# Patient Record
Sex: MALE | Race: BLACK OR AFRICAN AMERICAN | NOT HISPANIC OR LATINO | Employment: OTHER | URBAN - METROPOLITAN AREA
[De-identification: names, ages, dates, MRNs, and addresses within clinical notes are randomized per-mention and may not be internally consistent; named-entity substitution may affect disease eponyms.]

---

## 2017-01-10 ENCOUNTER — TELEPHONE (OUTPATIENT)
Dept: SURGERY | Facility: CLINIC | Age: 42
End: 2017-01-10

## 2017-01-17 ENCOUNTER — OFFICE VISIT (OUTPATIENT)
Dept: GASTROENTEROLOGY | Facility: CLINIC | Age: 42
End: 2017-01-17

## 2017-01-17 VITALS
HEIGHT: 73 IN | TEMPERATURE: 97.7 F | HEART RATE: 80 BPM | DIASTOLIC BLOOD PRESSURE: 79 MMHG | WEIGHT: 147.4 LBS | OXYGEN SATURATION: 99 % | SYSTOLIC BLOOD PRESSURE: 116 MMHG | BODY MASS INDEX: 19.54 KG/M2

## 2017-01-17 DIAGNOSIS — K59.00 CONSTIPATION, UNSPECIFIED CONSTIPATION TYPE: Primary | ICD-10-CM

## 2017-01-17 DIAGNOSIS — S39.011A ABDOMINAL MUSCLE STRAIN, INITIAL ENCOUNTER: ICD-10-CM

## 2017-01-17 RX ORDER — POLYETHYLENE GLYCOL 3350 17 G/17G
1 POWDER, FOR SOLUTION ORAL 2 TIMES DAILY PRN
Qty: 510 G | Refills: 1 | Status: SHIPPED
Start: 2017-01-17

## 2017-01-17 ASSESSMENT — PAIN SCALES - GENERAL: PAINLEVEL: NO PAIN (0)

## 2017-01-17 NOTE — MR AVS SNAPSHOT
After Visit Summary   1/17/2017    Piter Conway    MRN: [id number]           Patient Information     Date Of Birth          1975        Visit Information        Provider Department      1/17/2017 9:00 AM [facility]        Today's Diagnoses     Constipation, unspecified constipation type    -  1       Care Instructions    It was a pleasure taking care of you today.  I've included a brief summary of our discussion and care plan from today's visit below.  Please review this information with your primary care provider.  _______________________________________________________________________    My recommendations are summarized as follows:  1. Start psyllium fiber (brand name is Metamucil) once per day (available over the counter) to prevent constipation.   2. Take miralax one to two times per day if you are feeling constipated.   3. Do abdominal stretches two to three times per day.  --    Return to GI Clinic in 3 months to review your progress.    _______________________________________________________________________    Who do I call with any questions after my visit?  Please be in touch if there are any further questions that arise following today's visit.  There are multiple ways to contact your gastroenterology care team.        During business hours, you may reach a Gastroenterology nurse at [phone] and choose option 3.         To schedule or reschedule an appointment, please call [phone].       You can always send a secure message through Popbasic.  Popbasic messages are answered by your nurse or doctor typically within 24 hours.  Please allow extra time on weekends and holidays.        For urgent/emergent questions after business hours, you may reach the on-call GI Fellow by contacting the [facility] at [phone].     How will I get the results of any tests ordered?    You will receive all of your results.  If you have signed up for  MyChart, any tests ordered at your visit will be available to you after your physician reviews them.  Typically this takes 1-2 weeks.  If there are urgent results that require a change in your care plan, your physician or nurse will call you to discuss the next steps.      What is [employer]?  Fliqz is a secure way for you to access all of your healthcare records from the [facility].  It is a web based computer program, so you can sign on to it from any location.  It also allows you to send secure messages to your care team.  I recommend signing up for Appetizer Mobilet access if you have not already done so and are comfortable with using a computer.      How to I schedule a follow-up visit?  If you did not schedule a follow-up visit today, please call [phone] to schedule a follow-up office visit.        Sincerely,    Alvin Weston MD  Fellow  [facility]  Division of Gastroenterology                Follow-ups after your visit        Your next 10 appointments already scheduled     Feb 03, 2017  8:30 AM   (Arrive by 8:00 AM)   RETURN DIABETES with Sheila Brewer PA-C   [facility] ([facility])    [address] [place] [address] [id number] [phone]              Who to contact     Please call your clinic at [phone] to:    Ask questions about your health    Make or cancel appointments    Discuss your medicines    Learn about your test results    Speak to your doctor   If you have compliments or concerns about an experience at your clinic, or if you wish to file a complaint, please contact [facility] Patient Relations at [phone] or email us at [email or web address]         Additional Information About Your Visit        MyChart Information     Appetizer Mobilet gives you secure access to your electronic health record. If you see a primary care provider, you can also send messages to your care team and  "make appointments. If you have questions, please call your primary care clinic.  If you do not have a primary care provider, please call [phone] and they will assist you.      [email or web address] is an electronic gateway that provides easy, online access to your medical records. With [email or web address], you can request a clinic appointment, read your test results, renew a prescription or communicate with your care team.     To access your existing account, please contact your [facility] or call [phone] for assistance.        Care EveryWhere ID     This is your Care EveryWhere ID. This could be used by other organizations to access your [facility] medical records  [id number]        Your Vitals Were     Pulse Temperature Height BMI (Body Mass Index) Pulse Oximetry       80 97.7  F (36.5  C) (Oral) 1.854 m (6' 1\") 19.45 kg/m2 99%        Blood Pressure from Last 3 Encounters:   01/17/17 116/79   10/25/16 117/81   10/11/16 122/85    Weight from Last 3 Encounters:   01/17/17 66.86 kg (147 lb 6.4 oz)   10/25/16 65.363 kg (144 lb 1.6 oz)   10/11/16 64.864 kg (143 lb)              Today, you had the following     No orders found for display         Today's Medication Changes          These changes are accurate as of: 1/17/17  9:57 AM.  If you have any questions, ask your nurse or doctor.               Start taking these medicines.        Dose/Directions    polyethylene glycol powder   Commonly known as:  MIRALAX   Used for:  Constipation, unspecified constipation type   Started by:  Alvin Weston        Dose:  1 capful   Take 17 g (1 capful) by mouth 2 times daily as needed for constipation   Quantity:  510 g   Refills:  1            Where to get your medicines      These medications were sent to [facility] [place], [place] - [address]  [address] [address]     Phone:  [phone]    - polyethylene glycol powder             Primary Care Provider Office " Phone # Fax #    Rosamaria Ronda Banerjee [phone]       [place] [id number] [place] [id number]        Thank you!     Thank you for choosing [facility]STROENTEROLOGY AND IBD  for your care. Our goal is always to provide you with excellent care. Hearing back from our patients is one way we can continue to improve our services. Please take a few minutes to complete the written survey that you may receive in the mail after your visit with us. Thank you!             Your Updated Medication List - Protect others around you: Learn how to safely use, store and throw away your medicines at www.disposemymeds.org.          This list is accurate as of: 1/17/17  9:57 AM.  Always use your most recent med list.                   Brand Name Dispense Instructions for use    BENADRYL ALLERGY PO      Take 1 tablet by mouth daily       blood glucose monitoring lancets     1 Box    Use to test blood sugar 2-3 times daily or as directed.       blood glucose monitoring meter device kit     1 kit    Use to test blood sugar       * blood glucose monitoring test strip    MARCO ANTONIO CONTOUR NEXT    200 each    Use to test blood sugar 2 times daily or as directed.       * ONE TOUCH VERIO IQ test strip   Generic drug:  blood glucose monitoring     2 Box    Test blood sugar twice daily.       fluticasone 50 MCG/ACT spray    FLONASE    16 g    Spray 2 sprays into both nostrils daily       metFORMIN 500 MG 24 hr tablet    GLUCOPHAGE-XR    360 tablet    Take 2 tablets (1,000 mg) by mouth 2 times daily (with meals)       omeprazole 20 MG CR capsule    priLOSEC    30 capsule    Take 20 mg daily       polyethylene glycol powder    MIRALAX    510 g    Take 17 g (1 capful) by mouth 2 times daily as needed for constipation       * Notice:  This list has 2 medication(s) that are the same as other medications prescribed for you. Read the directions carefully, and ask your doctor or other care provider to review them with you.

## 2017-01-17 NOTE — PATIENT INSTRUCTIONS
It was a pleasure taking care of you today.  I've included a brief summary of our discussion and care plan from today's visit below.  Please review this information with your primary care provider.  _______________________________________________________________________    My recommendations are summarized as follows:  1. Start psyllium fiber (brand name is Metamucil) once per day (available over the counter) to prevent constipation.   2. Take miralax one to two times per day if you are feeling constipated.   3. Do abdominal stretches two to three times per day.  --    Return to GI Clinic in 3 months to review your progress.    _______________________________________________________________________    Who do I call with any questions after my visit?  Please be in touch if there are any further questions that arise following today's visit.  There are multiple ways to contact your gastroenterology care team.        During business hours, you may reach a Gastroenterology nurse at [phone] and choose option 3.         To schedule or reschedule an appointment, please call [phone].       You can always send a secure message through Taomee.  Taomee messages are answered by your nurse or doctor typically within 24 hours.  Please allow extra time on weekends and holidays.        For urgent/emergent questions after business hours, you may reach the on-call GI Fellow by contacting the [facility] at [phone].     How will I get the results of any tests ordered?    You will receive all of your results.  If you have signed up for Taomee, any tests ordered at your visit will be available to you after your physician reviews them.  Typically this takes 1-2 weeks.  If there are urgent results that require a change in your care plan, your physician or nurse will call you to discuss the next steps.      What is Taomee?  Taomee is a secure way for you to access all of your healthcare records from  the [facility].  It is a web based computer program, so you can sign on to it from any location.  It also allows you to send secure messages to your care team.  I recommend signing up for Tagito access if you have not already done so and are comfortable with using a computer.      How to I schedule a follow-up visit?  If you did not schedule a follow-up visit today, please call [phone] to schedule a follow-up office visit.        Sincerely,    Alvin Weston MD  Fellow  [facility]  Division of Gastroenterology

## 2017-01-17 NOTE — NURSING NOTE
"Chief Complaint   Patient presents with     RECHECK     Follow up cte       Filed Vitals:    01/17/17 0906   BP: 116/79   Pulse: 80   Temp: 97.7  F (36.5  C)   TempSrc: Oral   Height: 6' 1\"   Weight: 147 lb 6.4 oz   SpO2: 99%       Body mass index is 19.45 kg/(m^2).    Jessica Gimenez CMA                            "

## 2017-01-19 ENCOUNTER — TELEPHONE (OUTPATIENT)
Dept: ENDOCRINOLOGY | Facility: CLINIC | Age: 42
End: 2017-01-19

## 2017-01-19 DIAGNOSIS — E13.9 LADA (LATENT AUTOIMMUNE DIABETES IN ADULTS), MANAGED AS TYPE 1 (H): Primary | ICD-10-CM

## 2017-01-24 NOTE — PROGRESS NOTES
GI CLINIC VISIT    GI CLINIC VISIT    CC/REFERRING MD:  Rosamaria Banerjee    REASON FOR CONSULTATION:    I was asked to see Mr Conway in consultation at the request of Dr. Rosamaria Banerjee for chronic abdominal pain.    ASSESSMENT/PLAN:  Mr Conway is a 43 yo man with h/o treated H pylori who presents for follow up of chronic right upper quadrant abdominal pain x12 years.     Mr Man's pin point RLQ abdominal pain is largely the same in quality as our previous visit in 10/25/16. He still has a positive Carnett's sign and notes pain in supine position when raising legs. He also notes abdominal pain when constipated (when having a BM every other day rather than 1-2 per day), which gets better after defecation. In addition, he notes abdominal pain when eating large meals. He has stopped taking NSAIDs and instead uses small doses of tylenol.     He did not have any abnormalities on his CT outside of moderate stool burden. Most likely this is consistent with musculoskeletal etiology. I have given him some abdominal stretches to perform. We will also start metamucil to keep him regular and miralax when he feels constipated in case this is contributing. He is unlikely to have an ulcer causing pain for this long.     - start metamucil QD  - start miralax PRN constipation (if no BM for one day)  - abdominal stretches  - if still having abdominal pain despite these measures, could consider colonoscopy    RTC 3 months.    Patient was seen and discussed with attending, Dr Da Silva.    Thank you for this consultation.  It was a pleasure to participate in the care of this patient; please contact us with any further questions.      Alvin Weston MD  Fellow  Division of Gastroenterology, Hepatology and Nutrition  [facility]    ATTENDING ATTESTATION:     DATE SEEN: 1/17/2017    Patient was discussed, seen, and examined by me, Rm Da Silva. The plan of care and pertinent data/imaging were also reviewed with the GI  Consult team and GI Fellow as well. Agree with the assessment and plan as delineated above.    Please contact me with any further questions.    Rm Da Silva MD    [facility]  Division of Gastroenterology, Hepatology and Nutrition          HPI  Mr Conway is a 41 yo man with h/o treated H pylori who presents for follow up of chronic right upper quadrant abdominal pain x12 years.     Since our initial visit 10/25/16, he still notes pin point sharp RLQ pain 2inches lateral and 1 inch inferior to the umbilicus. He thinks this is slightly improved since our previous visit. He has been doing sit ups. He stopped taking ibuprofen (was taking ~4g/week). He notes pain if he raises his legs while in the supine position. He will also get the pain 20-30 minutes after eating a large meal; small meals do not bother him. This pain lasts 15 minutes. It also occurs when he strains for a bowel movement, which occurs if he doesn't have a bowel movement for a day. He then feels constipated. Usually he has 1-2 BM/day though.     He had an EGD in 2006 and was diagnosed with H pylori s/p treatment with confirmation of eradication by stool antigen. He takes omeprazole for reflux symptoms as needed which does not affect abdominal pain.     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) (H) 08/05/2014     Priority: Medium     Erectile dysfunction 02/06/2013     Atopic dermatitis 02/06/2013     Seasonal allergic rhinitis  02/06/2013     PERTINENT PAST MEDICAL HISTORY:  Past Medical History   Diagnosis Date     History of hepatitis A 2001     Diabetes (H) 2014     PREVIOUS SURGERIES:  Past Surgical History   Procedure Laterality Date     No history of surgery       PREVIOUS ENDOSCOPY:  EGD 2006 without any ulcer noted    ALLERGIES:   No Known Allergies    PERTINENT MEDICATIONS:    Current outpatient prescriptions:      polyethylene glycol (MIRALAX) powder, Take 17 g (1 capful) by mouth 2 times daily as needed for constipation, Disp: 510 g, Rfl: 1     blood glucose monitoring (ONETOUCH VERIO) meter device kit, Use to test blood sugar, Disp: 1 kit, Rfl: 0     fluticasone (FLONASE) 50 MCG/ACT nasal spray, Spray 2 sprays into both nostrils daily, Disp: 16 g, Rfl: 3     blood glucose monitoring (MARCO ANTONIO CONTOUR NEXT) test strip, Use to test blood sugar 2 times daily or as directed., Disp: 200 each, Rfl: 2     omeprazole (PRILOSEC) 20 MG capsule, Take 20 mg daily, Disp: 30 capsule, Rfl: 2     metFORMIN (GLUCOPHAGE-XR) 500 MG 24 hr tablet, Take 2 tablets (1,000 mg) by mouth 2 times daily (with meals), Disp: 360 tablet, Rfl: 3     blood glucose monitoring (MARCO ANTONIO MICROLET) lancets, Use to test blood sugar 2-3 times daily or as directed., Disp: 1 Box, Rfl: 12     blood glucose monitoring (ONE TOUCH VERIO IQ) test strip, Test blood sugar twice daily., Disp: 180 each, Rfl: 3     DiphenhydrAMINE HCl (BENADRYL ALLERGY PO), Take 1 tablet by mouth daily, Disp: , Rfl:     SOCIAL HISTORY:  Social History     Social History     Marital Status:      Spouse Name: N/A     Number of Children: N/A     Years of Education: N/A     Occupational History     Not on file.     Social History Main Topics     Smoking status: Never Smoker      Smokeless tobacco: Not on file     Alcohol Use: No     Drug Use: No     Sexual Activity:     Partners: Female      Comment:      Other Topics Concern     Not on file     Social History Narrative     FAMILY  "HISTORY:  Family History   Problem Relation Age of Onset     Hypertension Father      DIABETES Father      Glaucoma No family hx of      Macular Degeneration No family hx of      Past/family/social history reviewed and no changes    PHYSICAL EXAMINATION:  Constitutional: aaox3, cooperative, pleasant, not dyspneic/diaphoretic, no acute distress  Vitals reviewed: /79 mmHg  Pulse 80  Temp(Src) 97.7  F (36.5  C) (Oral)  Ht 1.854 m (6' 1\")  Wt 66.86 kg (147 lb 6.4 oz)  BMI 19.45 kg/m2  SpO2 99%  Wt:   Wt Readings from Last 2 Encounters:   01/17/17 66.86 kg (147 lb 6.4 oz)   10/25/16 65.363 kg (144 lb 1.6 oz)      Eyes: Sclera anicteric/injected  Ears/nose/mouth/throat: Normal oropharynx without ulcers or exudate, mucus membranes moist, hearing intact  Neck: supple  CV: No edema  Respiratory: Unlabored breathing  Lymph: No cervical lymphadenopathy  Abd: TTP to 2cm area 2in to the right and 1in inferior of umbilicus, Nondistended, +bs, no hepatosplenomegaly, nontender, no peritoneal signs  Skin: warm, perfused, no jaundice  Psych: Normal affect  MSK: Normal gait    PERTINENT STUDIES:    Orders Only on 10/11/2016   Component Date Value Ref Range Status     Creatinine Urine 10/11/2016 120   Final     Albumin Urine mg/L 10/11/2016 5   Final     Albumin Urine mg/g Cr 10/11/2016 4.50  0 - 17 mg/g Cr Final     Creatinine 10/11/2016 0.83  0.66 - 1.25 mg/dL Final     GFR Estimate 10/11/2016   >60 mL/min/1.7m2 Final                    Value:>90  Non  GFR Calc       GFR Estimate If Black 10/11/2016   >60 mL/min/1.7m2 Final                    Value:>90   GFR Calc       Cholesterol 10/11/2016 127  <200 mg/dL Final     Triglycerides 10/11/2016 56  <150 mg/dL Final     HDL Cholesterol 10/11/2016 45  >39 mg/dL Final     LDL Cholesterol Calculated 10/11/2016 71  <100 mg/dL Final     Non HDL Cholesterol 10/11/2016 82  <130 mg/dL Final     TSH 10/11/2016 0.97  0.40 - 4.00 mU/L Final     Potassium " 10/11/2016 4.4  3.4 - 5.3 mmol/L Final     ALT 10/11/2016 15  0 - 70 U/L Final     AST 10/11/2016 10  0 - 45 U/L Final     T4 Free 10/11/2016 1.07  0.76 - 1.46 ng/dL Final     CT A/P 10/27/16  IMPRESSION:    1. No acute finding within abdomen pelvis to explain patient's right  upper quadrant pain.  2. Subcentimeter hepatic cyst.

## 2017-01-30 ENCOUNTER — TELEPHONE (OUTPATIENT)
Dept: ENDOCRINOLOGY | Facility: CLINIC | Age: 42
End: 2017-01-30

## 2017-01-30 NOTE — TELEPHONE ENCOUNTER
Pt called re: metformin. Requesting new Rx. Please advise. Can be reached at [phone]. Detailed VM fine. Message sent to refill pool.

## 2017-01-31 DIAGNOSIS — E13.9 LADA (LATENT AUTOIMMUNE DIABETES MELLITUS IN ADULTS) (H): Primary | ICD-10-CM

## 2017-01-31 RX ORDER — METFORMIN HCL 500 MG
1000 TABLET, EXTENDED RELEASE 24 HR ORAL 2 TIMES DAILY WITH MEALS
Qty: 360 TABLET | Refills: 3 | Status: SHIPPED | OUTPATIENT
Start: 2017-01-31 | End: 2018-02-07

## 2017-01-31 NOTE — TELEPHONE ENCOUNTER
Metformin  Last Written Prescription Date:  1/11/16  Last Fill Quantity: 360,   # refills: 3  Last Office Visit : 10/11/16  Future Office visit:  2/3/17

## 2017-02-07 ENCOUNTER — OFFICE VISIT (OUTPATIENT)
Dept: ENDOCRINOLOGY | Facility: CLINIC | Age: 42
End: 2017-02-07

## 2017-02-07 VITALS
HEIGHT: 73 IN | SYSTOLIC BLOOD PRESSURE: 129 MMHG | DIASTOLIC BLOOD PRESSURE: 84 MMHG | BODY MASS INDEX: 19.16 KG/M2 | WEIGHT: 144.6 LBS | HEART RATE: 74 BPM

## 2017-02-07 DIAGNOSIS — E10.65 TYPE 1 DIABETES MELLITUS WITH HYPERGLYCEMIA (H): Primary | ICD-10-CM

## 2017-02-07 LAB — HBA1C MFR BLD: 5.8 % (ref 4.3–6)

## 2017-02-07 RX ORDER — BISMUTH SUBSALICYLATE 262MG/15ML
1 SUSPENSION, ORAL (FINAL DOSE FORM) ORAL 2 TIMES DAILY
Qty: 1 BOX | Refills: 3 | Status: SHIPPED | OUTPATIENT
Start: 2017-02-07 | End: 2017-08-08

## 2017-02-07 NOTE — PROGRESS NOTES
HPI  Piter Conway is a 42 year old male with SAMANTA here today for a follow up visit.  He continues to do well.  He was found to have an A1C of 10.6 % one year ago.  His EJ was + and C-peptide 1.0.   Pt is currently taking Metformin 1000 mg po BID.  Pt made significant changes in his lifestyle and is eating healthy and exercising.  His A1C is 5.8 % today and his previous A1C was 6.0 %.   We downloaded his glucose meter today and his blood sugar values are good with an average glucose of 126 with SD 13.  No hypoglycemia.  On ROS today, he no longer has RUQ pain.  Pt is taking meds for constipation which have been helpful.  Pt denies blurred vision, n/v, SOB, cough, chest pain, dysuria or hematuria.  No foot ulcers.  He denies numbness, tingling or pain in his feet or hands.    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  Nephropathy:none; pt's urine microalbuminuria negative in Oct 2016.  Neuropathy:none.  Foot Exam:no ulcers; normal monofilamentous exam.  Taking aspirin:no  Lipids: LDL 71 in Oct 2016.    ROS  Please see under HPI.    Allergies  No Known Allergies    Medications  Current Outpatient Prescriptions   Medication Sig Dispense Refill     blood glucose monitoring (ULTRA THIN 30G) lancets 1 each 2 times daily Use to test blood sugar. 1 Box 3     metFORMIN (GLUCOPHAGE-XR) 500 MG 24 hr tablet Take 2 tablets (1,000 mg) by mouth 2 times daily (with meals) 360 tablet 3     blood glucose monitoring (ONE TOUCH VERIO IQ) test strip Test blood sugar twice daily. 180 each 3     polyethylene glycol (MIRALAX) powder Take 17 g (1 capful) by mouth 2 times daily as needed for constipation 510 g 1     blood glucose monitoring (ONETOUCH VERIO) meter device kit Use to test blood sugar 1 kit 0     fluticasone (FLONASE) 50 MCG/ACT nasal spray Spray 2 sprays into both nostrils daily 16 g 3     omeprazole (PRILOSEC) 20 MG capsule Take 20 mg daily 30 capsule 2      "DiphenhydrAMINE HCl (BENADRYL ALLERGY PO) Take 1 tablet by mouth daily         Family History  family history includes DIABETES in his father; Hypertension in his father. There is no history of Glaucoma or Macular Degeneration.    Social History  Smoke: none.  ETOH: none.    Past Medical History  1. SAMANTA - dx 2014.    Physical Exam  /84 mmHg  Pulse 74  Ht 1.854 m (6' 1\")  Wt 65.59 kg (144 lb 9.6 oz)  BMI 19.08 kg/m2  Body mass index is 19.08 kg/(m^2).    GENERAL : In no apparent distress.  SKIN: Normal.  EYES: PERRLA.    MOUTH: Moist.  NECK: No goiter.  RESP: Lungs clear to auscultation.  CARDIAC: RRR.   ABDOMEN: Nontender.   NEURO: awake, alert, responds appropriately to questions.    Moves all extremities; Gait normal.  No tremor.    EXTREMITIES: No edema.  FEET:  No ulcers; normal monofilamentous exam.    RESULTS  CREATININE   Date Value Ref Range Status   10/11/2016 0.83 0.66 - 1.25 mg/dL Final     GFR ESTIMATE   Date Value Ref Range Status   10/11/2016 >90  Non  GFR Calc   >60 mL/min/1.7m2 Final     HEMOGLOBIN A1C   Date Value Ref Range Status   06/26/2014 10.6* 4.1 - 5.7 % Final     POTASSIUM   Date Value Ref Range Status   10/11/2016 4.4 3.4 - 5.3 mmol/L Final     ALT   Date Value Ref Range Status   10/11/2016 15 0 - 70 U/L Final     AST   Date Value Ref Range Status   10/11/2016 10 0 - 45 U/L Final     TSH   Date Value Ref Range Status   10/11/2016 0.97 0.40 - 4.00 mU/L Final     T4 FREE   Date Value Ref Range Status   10/11/2016 1.07 0.76 - 1.46 ng/dL Final       CHOLESTEROL   Date Value Ref Range Status   10/11/2016 127 <200 mg/dL Final   08/27/2015 132 <200 mg/dL Final     Comment:     LDL Cholesterol is the primary guide to therapy.   The NCEP recommends further evaluation of: patients with cholesterol greater   than 200 mg/dL if additional risk factors are present, cholesterol greater   than   240 mg/dL, triglycerides greater than 150 mg/dL, or HDL less than 40 mg/dL.       HDL " CHOLESTEROL   Date Value Ref Range Status   10/11/2016 45 >39 mg/dL Final   08/27/2015 44 >40 mg/dL Final     LDL CHOLESTEROL CALCULATED   Date Value Ref Range Status   10/11/2016 71 <100 mg/dL Final     Comment:     Desirable:       <100 mg/dl   08/27/2015 79 0 - 129 mg/dL Final     Comment:     LDL Cholesterol is the primary guide to therapy: LDL-cholesterol goal in high   risk patients is <100 mg/dL and in very high risk patients is <70 mg/dL.       LDL CHOLESTEROL DIRECT   Date Value Ref Range Status   02/06/2013 87.0 0.0 - 129.0 Final     TRIGLYCERIDES   Date Value Ref Range Status   10/11/2016 56 <150 mg/dL Final   08/27/2015 47 0 - 150 mg/dL Final     CHOLESTEROL/HDL RATIO   Date Value Ref Range Status   08/27/2015 3.0 0.0 - 5.0 Final   09/11/2014 2.8 0.0 - 5.0 Final     A1C      5.8    2/7/2017  A1C      6.0    10/11/2016  A1C      5.7    6/7/2016  A1C      6.3    3/4/2016  A1C      6.0    10/2/2015  A1C      6.6    4/2015  A1C     10.6   6/26/2014  A1C      6.0   2/6/2013  A1C      5.6   3/20/2012    ASSESSMENT/PLAN:    1.  SAMANTA:  Good glycemic control at this time with lifestyle changes and Metformin.  No need for insulin at this time.  I asked him to continue to check his blood sugar BID.  He is to remain on Metformin.  Pt is to notify us if he has FBS values > 130 or 2 hr postprandial blood sugar values are > 180.  He is also to notify us if he develops polydipsia, polyuria, or blurred vision.  Encouraged pt to continues to eat healthy and exercise.  His BP is stable today.  Creat is 0.83 with GFR > 90 mL/min in Oct 2016.  His urine microalbuminuria was negative in 10/2016.  Feet are in good condition with normal monofilamentous exam.  TSH also normal in Oct 2016.  Pt's LDL 71 in 10/2016.  Pt refuses the flu vaccine.    2. MILD NPDR: Pt seen by Oph here in 3/2016.  No visual changes.  He will see Oph again this spring 2017.    3.  Return to Endocrine Clinic to see me in 4 months and sooner if he develops  symptoms of hyperglycemia or blood sugar values above target.  He is aware that he will need insulin RX at some time in the near future.  Will arrange for pt to see Dr. Alexander in 8 months.

## 2017-02-07 NOTE — MR AVS SNAPSHOT
After Visit Summary   2/7/2017    Piter Conway    MRN: [id number]           Patient Information     Date Of Birth          1975        Visit Information        Provider Department      2/7/2017 9:00 AM Sheila Brewer PA-C [facility] Endocrinology        Today's Diagnoses     Type 1 diabetes mellitus with hyperglycemia (H)    -  1       Care Instructions    To expedite your medication refill(s), please contact your pharmacy and have them fax a refill request to: [phone].  *Please allow 3 business days for routine medication refills.  *Please allow 5 business days for controlled substance medication refills.  --------------------  For scheduling appointments (including lab work), please request an appointment through [employer], or call: [phone].    For questions for your provider or the endocrine nurse, please send a [employer] message.  For after-hours urgent issues, please dial [phone], and ask to speak with the Endocrinologist On-Call.  --------------------  Please Note: If you are active on [employer], all future test results will be sent by [employer] message only and will no longer be sent by mail. You may also receive communication directly from your physician.          Follow-ups after your visit        Follow-up notes from your care team     Return in about 4 months (around 6/7/2017).      Your next 10 appointments already scheduled     Apr 10, 2017  2:30 PM   RETURN GENERAL with Analisa Long MD   Eye Clinic ([facility])    Navjot Padgett [address]  [facility] [id number] [phone]            Jun 06, 2017  8:30 AM   (Arrive by 8:15 AM)   RETURN DIABETES with MELISSA Bai [facility] Endocrinology ([facility])    [address] [facility]  [address] [facility] [id number] [phone]              Who to contact     Please call your clinic at [phone] to:    Ask questions about your  "health    Make or cancel appointments    Discuss your medicines    Learn about your test results    Speak to your doctor   If you have compliments or concerns about an experience at your clinic, or if you wish to file a complaint, please contact [facility] Patient Relations at [phone] or email us at [email or web address]         Additional Information About Your Visit        MyChart Information     [employer] gives you secure access to your electronic health record. If you see a primary care provider, you can also send messages to your care team and make appointments. If you have questions, please call your primary care clinic.  If you do not have a primary care provider, please call [phone] and they will assist you.      [employer] is an electronic gateway that provides easy, online access to your medical records. With [employer], you can request a clinic appointment, read your test results, renew a prescription or communicate with your care team.     To access your existing account, please contact your [facility] or call [phone] for assistance.        Care EveryWhere ID     This is your Care EveryWhere ID. This could be used by other organizations to access your [place] medical records  [id number]        Your Vitals Were     Pulse Height BMI (Body Mass Index)             74 1.854 m (6' 1\") 19.08 kg/m2          Blood Pressure from Last 3 Encounters:   02/07/17 129/84   01/17/17 116/79   10/25/16 117/81    Weight from Last 3 Encounters:   02/07/17 65.59 kg (144 lb 9.6 oz)   01/17/17 66.86 kg (147 lb 6.4 oz)   10/25/16 65.363 kg (144 lb 1.6 oz)              Today, you had the following     No orders found for display         Today's Medication Changes          These changes are accurate as of: 2/7/17  9:49 AM.  If you have any questions, ask your nurse or doctor.               These medicines have changed or have updated prescriptions.     "    Dose/Directions    blood glucose monitoring lancets   This may have changed:    - how much to take  - how to take this  - when to take this  - additional instructions   Used for:  Type 1 diabetes mellitus with hyperglycemia (H)        Dose:  1 each   1 each 2 times daily Use to test blood sugar.   Quantity:  1 Box   Refills:  3       blood glucose monitoring test strip   Commonly known as:  ONE TOUCH VERIO IQ   This may have changed:  Another medication with the same name was removed. Continue taking this medication, and follow the directions you see here.   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   Quantity:  180 each   Refills:  3            Where to get your medicines      These medications were sent to [facility] [place], [place] - [address]  [address] [address]     Phone:  [phone]    - blood glucose monitoring lancets             Primary Care Provider Office Phone # Fax #    Rosamaria Banerjee [phone]       [facility] [address] [facility] [address]  [facility] [address]        Thank you!     Thank you for choosing [facility] ENDOCRINOLOGY  for your care. Our goal is always to provide you with excellent care. Hearing back from our patients is one way we can continue to improve our services. Please take a few minutes to complete the written survey that you may receive in the mail after your visit with us. Thank you!             Your Updated Medication List - Protect others around you: Learn how to safely use, store and throw away your medicines at www.disposemymeds.org.          This list is accurate as of: 2/7/17  9:49 AM.  Always use your most recent med list.                   Brand Name Dispense Instructions for use    BENADRYL ALLERGY PO      Take 1 tablet by mouth daily       blood glucose monitoring lancets     1 Box    1 each 2 times daily Use to test blood  sugar.       blood glucose monitoring meter device kit     1 kit    Use to test blood sugar       blood glucose monitoring test strip    ONE TOUCH VERIO IQ    180 each    Test blood sugar twice daily.       fluticasone 50 MCG/ACT spray    FLONASE    16 g    Spray 2 sprays into both nostrils daily       metFORMIN 500 MG 24 hr tablet    GLUCOPHAGE-XR    360 tablet    Take 2 tablets (1,000 mg) by mouth 2 times daily (with meals)       omeprazole 20 MG CR capsule    priLOSEC    30 capsule    Take 20 mg daily       polyethylene glycol powder    MIRALAX    510 g    Take 17 g (1 capful) by mouth 2 times daily as needed for constipation

## 2017-02-07 NOTE — Clinical Note
2/7/2017       RE: Piter Conway  [address]   [facility] [address]     Dear Colleague,    Thank you for referring your patient, Piter Conway, to the [facility] ENDOCRINOLOGY at [facility]. Please see a copy of my visit note below.    HPI  Piter Conway is a 42 year old male with SAMANTA here today for a follow up visit.  He continues to do well.  He was found to have an A1C of 10.6 % one year ago.  His EJ was + and C-peptide 1.0.   Pt is currently taking Metformin 1000 mg po BID.  Pt made significant changes in his lifestyle and is eating healthy and exercising.  His A1C is 5.8 % today and his previous A1C was 6.0 %.   We downloaded his glucose meter today and his blood sugar values are good with an average glucose of 126 with SD 13.  No hypoglycemia.  On ROS today, he no longer has RUQ pain.  Pt is taking meds for constipation which have been helpful.  Pt denies blurred vision, n/v, SOB, cough, chest pain, dysuria or hematuria.  No foot ulcers.  He denies numbness, tingling or pain in his feet or hands.    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  Nephropathy:none; pt's urine microalbuminuria negative in Oct 2016.  Neuropathy:none.  Foot Exam:no ulcers; normal monofilamentous exam.  Taking aspirin:no  Lipids: LDL 71 in Oct 2016.    ROS  Please see under HPI.    Allergies  No Known Allergies    Medications  Current Outpatient Prescriptions   Medication Sig Dispense Refill     blood glucose monitoring (ULTRA THIN 30G) lancets 1 each 2 times daily Use to test blood sugar. 1 Box 3     metFORMIN (GLUCOPHAGE-XR) 500 MG 24 hr tablet Take 2 tablets (1,000 mg) by mouth 2 times daily (with meals) 360 tablet 3     blood glucose monitoring (ONE TOUCH VERIO IQ) test strip Test blood sugar twice daily. 180 each 3     polyethylene glycol (MIRALAX) powder Take 17 g (1 capful) by mouth 2 times daily as needed for constipation 510 g 1  "    blood glucose monitoring (ONETOUCH VERIO) meter device kit Use to test blood sugar 1 kit 0     fluticasone (FLONASE) 50 MCG/ACT nasal spray Spray 2 sprays into both nostrils daily 16 g 3     omeprazole (PRILOSEC) 20 MG capsule Take 20 mg daily 30 capsule 2     DiphenhydrAMINE HCl (BENADRYL ALLERGY PO) Take 1 tablet by mouth daily         Family History  family history includes DIABETES in his father; Hypertension in his father. There is no history of Glaucoma or Macular Degeneration.    Social History  Smoke: none.  ETOH: none.    Past Medical History  1. SAMANTA - dx 2014.    Physical Exam  /84 mmHg  Pulse 74  Ht 1.854 m (6' 1\")  Wt 65.59 kg (144 lb 9.6 oz)  BMI 19.08 kg/m2  Body mass index is 19.08 kg/(m^2).    GENERAL : In no apparent distress.  SKIN: Normal.  EYES: PERRLA.    MOUTH: Moist.  NECK: No goiter.  RESP: Lungs clear to auscultation.  CARDIAC: RRR.   ABDOMEN: Nontender.   NEURO: awake, alert, responds appropriately to questions.    Moves all extremities; Gait normal.  No tremor.    EXTREMITIES: No edema.  FEET:  No ulcers; normal monofilamentous exam.    RESULTS  CREATININE   Date Value Ref Range Status   10/11/2016 0.83 0.66 - 1.25 mg/dL Final     GFR ESTIMATE   Date Value Ref Range Status   10/11/2016 >90  Non  GFR Calc   >60 mL/min/1.7m2 Final     HEMOGLOBIN A1C   Date Value Ref Range Status   06/26/2014 10.6* 4.1 - 5.7 % Final     POTASSIUM   Date Value Ref Range Status   10/11/2016 4.4 3.4 - 5.3 mmol/L Final     ALT   Date Value Ref Range Status   10/11/2016 15 0 - 70 U/L Final     AST   Date Value Ref Range Status   10/11/2016 10 0 - 45 U/L Final     TSH   Date Value Ref Range Status   10/11/2016 0.97 0.40 - 4.00 mU/L Final     T4 FREE   Date Value Ref Range Status   10/11/2016 1.07 0.76 - 1.46 ng/dL Final       CHOLESTEROL   Date Value Ref Range Status   10/11/2016 127 <200 mg/dL Final   08/27/2015 132 <200 mg/dL Final     Comment:     LDL Cholesterol is the primary " guide to therapy.   The NCEP recommends further evaluation of: patients with cholesterol greater   than 200 mg/dL if additional risk factors are present, cholesterol greater   than   240 mg/dL, triglycerides greater than 150 mg/dL, or HDL less than 40 mg/dL.       HDL CHOLESTEROL   Date Value Ref Range Status   10/11/2016 45 >39 mg/dL Final   08/27/2015 44 >40 mg/dL Final     LDL CHOLESTEROL CALCULATED   Date Value Ref Range Status   10/11/2016 71 <100 mg/dL Final     Comment:     Desirable:       <100 mg/dl   08/27/2015 79 0 - 129 mg/dL Final     Comment:     LDL Cholesterol is the primary guide to therapy: LDL-cholesterol goal in high   risk patients is <100 mg/dL and in very high risk patients is <70 mg/dL.       LDL CHOLESTEROL DIRECT   Date Value Ref Range Status   02/06/2013 87.0 0.0 - 129.0 Final     TRIGLYCERIDES   Date Value Ref Range Status   10/11/2016 56 <150 mg/dL Final   08/27/2015 47 0 - 150 mg/dL Final     CHOLESTEROL/HDL RATIO   Date Value Ref Range Status   08/27/2015 3.0 0.0 - 5.0 Final   09/11/2014 2.8 0.0 - 5.0 Final     A1C      5.8    2/7/2017  A1C      6.0    10/11/2016  A1C      5.7    6/7/2016  A1C      6.3    3/4/2016  A1C      6.0    10/2/2015  A1C      6.6    4/2015  A1C     10.6   6/26/2014  A1C      6.0   2/6/2013  A1C      5.6   3/20/2012    ASSESSMENT/PLAN:    1.  SAMANTA:  Good glycemic control at this time with lifestyle changes and Metformin.  No need for insulin at this time.  I asked him to continue to check his blood sugar BID.  He is to remain on Metformin.  Pt is to notify us if he has FBS values > 130 or 2 hr postprandial blood sugar values are > 180.  He is also to notify us if he develops polydipsia, polyuria, or blurred vision.  Encouraged pt to continues to eat healthy and exercise.  His BP is stable today.  Creat is 0.83 with GFR > 90 mL/min in Oct 2016.  His urine microalbuminuria was negative in 10/2016.  Feet are in good condition with normal monofilamentous exam.  TSH  also normal in Oct 2016.  Pt's LDL 71 in 10/2016.  Pt refuses the flu vaccine.    2. MILD NPDR: Pt seen by Oph here in 3/2016.  No visual changes.  He will see Oph again this spring 2017.    3.  Return to Endocrine Clinic to see me in 4 months and sooner if he develops symptoms of hyperglycemia or blood sugar values above target.  He is aware that he will need insulin RX at some time in the near future.  Will arrange for pt to see Dr. Alexander in 8 months.            Again, thank you for allowing me to participate in the care of your patient.      Sincerely,    Sheila Brewer PA-C

## 2017-02-07 NOTE — PATIENT INSTRUCTIONS
To expedite your medication refill(s), please contact your pharmacy and have them fax a refill request to: [phone].  *Please allow 3 business days for routine medication refills.  *Please allow 5 business days for controlled substance medication refills.  --------------------  For scheduling appointments (including lab work), please request an appointment through [employer], or call: [phone].    For questions for your provider or the endocrine nurse, please send a [employer] message.  For after-hours urgent issues, please dial [phone], and ask to speak with the Endocrinologist On-Call.  --------------------  Please Note: If you are active on [employer], all future test results will be sent by [employer] message only and will no longer be sent by mail. You may also receive communication directly from your physician.

## 2017-04-12 ENCOUNTER — OFFICE VISIT (OUTPATIENT)
Dept: OPHTHALMOLOGY | Facility: CLINIC | Age: 42
End: 2017-04-12
Attending: OPHTHALMOLOGY
Payer: COMMERCIAL

## 2017-04-12 DIAGNOSIS — H10.13 ALLERGIC CONJUNCTIVITIS, BILATERAL: ICD-10-CM

## 2017-04-12 DIAGNOSIS — E11.9 DIABETES MELLITUS TYPE 2 WITHOUT RETINOPATHY (H): Primary | ICD-10-CM

## 2017-04-12 PROCEDURE — 92015 DETERMINE REFRACTIVE STATE: CPT | Mod: ZF

## 2017-04-12 PROCEDURE — 99213 OFFICE O/P EST LOW 20 MIN: CPT | Mod: ZF

## 2017-04-12 ASSESSMENT — TONOMETRY
IOP_METHOD: TONOPEN
OD_IOP_MMHG: 13
OS_IOP_MMHG: 14

## 2017-04-12 ASSESSMENT — CUP TO DISC RATIO
OS_RATIO: 0.3
OD_RATIO: 0.4

## 2017-04-12 ASSESSMENT — REFRACTION_MANIFEST
OD_SPHERE: -0.25
OD_CYLINDER: SPHERE
OS_ADD: +0.50
OD_ADD: +0.50
OS_CYLINDER: SPHERE
OS_SPHERE: PLANO

## 2017-04-12 ASSESSMENT — SLIT LAMP EXAM - LIDS
COMMENTS: TRACE BLEPHARITIS
COMMENTS: TRACE BLEPHARITIS

## 2017-04-12 ASSESSMENT — CONF VISUAL FIELD
OS_NORMAL: 1
OD_NORMAL: 1

## 2017-04-12 ASSESSMENT — EXTERNAL EXAM - RIGHT EYE: OD_EXAM: NORMAL

## 2017-04-12 ASSESSMENT — VISUAL ACUITY
OD_SC+: -2
OS_SC+: -2
OD_SC: J1+
OD_SC: 20/20
METHOD: SNELLEN - LINEAR
OS_SC: 20/20
OS_SC: J1+

## 2017-04-12 ASSESSMENT — EXTERNAL EXAM - LEFT EYE: OS_EXAM: NORMAL

## 2017-04-12 NOTE — MR AVS SNAPSHOT
After Visit Summary   4/12/2017    Piter Conway    MRN: [id number]           Patient Information     Date Of Birth          1975        Visit Information        Provider Department      4/12/2017 2:45 PM Analisa Long MD [facility]        Today's Diagnoses     Diabetes mellitus type 2 without retinopathy (H)    -  1    Allergic conjunctivitis, bilateral           Follow-ups after your visit        Follow-up notes from your care team     Return in about 1 year (around 4/12/2018).      Your next 10 appointments already scheduled     Jun 06, 2017  8:30 AM CDT   (Arrive by 8:15 AM)   RETURN DIABETES with Sheila Brewer PA-C   [facility] Endocrinology ([facility])    [address] [place] [address] [phone]            Oct 16, 2017  4:00 PM CDT   (Arrive by 3:45 PM)   RETURN DIABETES with Elizabeth Scales MD   [facility])    [address] [place] [address] [phone]              Who to contact     Please call your clinic at [phone] to:    Ask questions about your health    Make or cancel appointments    Discuss your medicines    Learn about your test results    Speak to your doctor   If you have compliments or concerns about an experience at your clinic, or if you wish to file a complaint, please contact [facility] Patient Relations at [phone] or email us at [email or web address]         Additional Information About Your Visit        Radhahart Information     T[employer] gives you secure access to your electronic health record. If you see a primary care provider, you can also send messages to your care team and make appointments. If you have questions, please call your primary care clinic.  If you do not have a primary care provider, please call [phone] and they will assist you.      [employer] is an  electronic gateway that provides easy, online access to your medical records. With [employer], you can request a clinic appointment, read your test results, renew a prescription or communicate with your care team.     To access your existing account, please contact your [facility] or call [phone] for assistance.        Care EveryWhere ID     This is your Care EveryWhere ID. This could be used by other organizations to access your [facility] medical records  [id number]         Blood Pressure from Last 3 Encounters:   02/07/17 129/84   01/17/17 116/79   10/25/16 117/81    Weight from Last 3 Encounters:   02/07/17 65.6 kg (144 lb 9.6 oz)   01/17/17 66.9 kg (147 lb 6.4 oz)   10/25/16 65.4 kg (144 lb 1.6 oz)              Today, you had the following     No orders found for display       Primary Care Provider Office Phone # Fax #    Rosamaria Ronda Banerjee [phone]       [address] [address]        Thank you!     Thank you for choosing [facility]  for your care. Our goal is always to provide you with excellent care. Hearing back from our patients is one way we can continue to improve our services. Please take a few minutes to complete the written survey that you may receive in the mail after your visit with us. Thank you!             Your Updated Medication List - Protect others around you: Learn how to safely use, store and throw away your medicines at www.disposemymeds.org.          This list is accurate as of: 4/12/17  4:10 PM.  Always use your most recent med list.                   Brand Name Dispense Instructions for use    BENADRYL ALLERGY PO      Take 1 tablet by mouth daily       blood glucose monitoring lancets     1 Box    1 each 2 times daily Use to test blood sugar.       blood glucose monitoring meter device kit     1 kit    Use to test blood sugar       blood glucose monitoring test strip    ONE TOUCH VERIO IQ    180  each    Test blood sugar twice daily.       fluticasone 50 MCG/ACT spray    FLONASE    16 g    Spray 2 sprays into both nostrils daily       metFORMIN 500 MG 24 hr tablet    GLUCOPHAGE-XR    360 tablet    Take 2 tablets (1,000 mg) by mouth 2 times daily (with meals)       omeprazole 20 MG CR capsule    priLOSEC    30 capsule    Take 20 mg daily       polyethylene glycol powder    MIRALAX    510 g    Take 17 g (1 capful) by mouth 2 times daily as needed for constipation

## 2017-04-12 NOTE — PROGRESS NOTES
FLORDIALMA Conway is a 42 year old male here for diabetic eye exam. He states his vision is good in both eyes at near and distance without glasses. His eyes are comfortable now, but he gets itching associated with seasonal allergies. He uses OTC topical anti-allergy drops which help.    Last A1C was 5.8 on 2/7/17.    No vision change, flashes, floaters, or redness. He notes occasional tearing when it is windy outside    POH: No history of eye surgery or trauma  PMH: Diabetes type 2  FH: No FH of AMD or glc  SH: Non-smoker    Assessment & Plan    (E11.9) Diabetes mellitus type 2 without retinopathy (H) (primary encounter diagnosis)  Comment: Diagnosed in 2014. On metformin. Last A1c 5.8 on 2/7/17. No diabetic retinopathy.  Plan: Discussed the importance of tight blood glucose control in the prevention of diabetic retinopathy. Recommend yearly dilated eye exam.    (H10.13) Allergic conjunctivitis, bilateral    Comment: Seasonal symptoms  Plan: Ok to continue with OTC anti-allergy drops     -----------------------------------------------------------------------------------    Patient disposition:   Return in about 1 year (around 4/12/2018). or sooner as needed.      Trip Chan MD  PGY2, Dept of Ophthalmology  Pager [phone]      Teaching statement:  Complete documentation of historical and exam elements from today's encounter can be found in the full encounter summary report (not reduplicated in this progress note). I personally obtained the chief complaint(s) and history of present illness.  I confirmed and edited as necessary the review of systems, past medical/surgical history, family history, social history, and examination findings as documented by others; and I examined the patient myself. I personally reviewed the relevant tests, images, and reports as documented above.     I formulated and edited as necessary the assessment and plan and discussed the findings and management plan with the patient and  family.    Analisa Long MD  Comprehensive Ophthalmology & Ocular Pathology  Department of Ophthalmology and Visual Neurosciences  [email or web address]  Pager [phone]

## 2017-04-12 NOTE — NURSING NOTE
Chief Complaints and History of Present Illnesses   Patient presents with     Diabetic Eye Exam     s/p Allergic conjunctivitis, bilateral     HPI    Affected eye(s):  Both   Symptoms:     No blurred vision   No decreased vision   No floaters   No flashes   No tearing   No Dryness   No itching      Duration:  13 months   Frequency:  Constant       Do you have eye pain now?:  No      Comments:  States va is the same since last visit.      Pt stated last  A1C  6.0        2/01/2017                 A1C                      10.6                06/26/2014                 A1C                      6.0                 02/06/2013                 A1C                      5.6                 03/20/2012   BS: 120 this morning              Raymon Yadav  3:17 PM April 12, 2017

## 2017-08-08 ENCOUNTER — OFFICE VISIT (OUTPATIENT)
Dept: ENDOCRINOLOGY | Facility: CLINIC | Age: 42
End: 2017-08-08

## 2017-08-08 VITALS
WEIGHT: 142.4 LBS | BODY MASS INDEX: 18.87 KG/M2 | SYSTOLIC BLOOD PRESSURE: 116 MMHG | HEIGHT: 73 IN | HEART RATE: 83 BPM | DIASTOLIC BLOOD PRESSURE: 78 MMHG

## 2017-08-08 DIAGNOSIS — E10.65 TYPE 1 DIABETES MELLITUS WITH HYPERGLYCEMIA (H): Primary | ICD-10-CM

## 2017-08-08 LAB — HBA1C MFR BLD: 5.7 % (ref 4.3–6)

## 2017-08-08 ASSESSMENT — PAIN SCALES - GENERAL: PAINLEVEL: NO PAIN (0)

## 2017-08-08 NOTE — NURSING NOTE
"Chief Complaint   Patient presents with     RECHECK     Susana f/u        Initial /78 (BP Location: Right arm, Patient Position: Sitting, Cuff Size: Adult Regular)  Pulse 83  Ht 1.854 m (6' 1\")  Wt 64.6 kg (142 lb 6.4 oz)  BMI 18.79 kg/m2 Estimated body mass index is 18.79 kg/(m^2) as calculated from the following:    Height as of this encounter: 1.854 m (6' 1\").    Weight as of this encounter: 64.6 kg (142 lb 6.4 oz).  Medication Reconciliation: complete       "

## 2017-08-08 NOTE — MR AVS SNAPSHOT
After Visit Summary   8/8/2017    Piter Conway    MRN: [id number]           Patient Information     Date Of Birth          1975        Visit Information        Provider Department      8/8/2017 8:30 AM Sheila Brewer PA-C [facility] Endocrinology        Today's Diagnoses     Type 1 diabetes mellitus with hyperglycemia (H)    -  1       Follow-ups after your visit        Your next 10 appointments already scheduled     Dec 11, 2017 12:00 PM CST   LAB with  LAB   [facility] Lab ([facility])    [address] [place] [address] [phone]           Patient must bring picture ID. Patient should be prepared to give a urine specimen  Please do not eat 10-12 hours before your appointment if you are coming in fasting for labs on lipids, cholesterol, or glucose (sugar). Pregnant women should follow their Care Team instructions. Water with medications is okay. Do not drink coffee or other fluids. If you have concerns about taking  your medications, please ask at office or if scheduling via [employer]bloon[employer], send a message by clicking on Secure Messaging, Message Your Care Team.            Dec 11, 2017 12:30 PM CST   (Arrive by 12:15 PM)   RETURN DIABETES with Elizabeth Scales MD   [facility] Endocrinology ([facility])    [address] [place] [address] [phone]              Future tests that were ordered for you today     Open Future Orders        Priority Expected Expires Ordered    Hemoglobin A1c Routine 12/8/2017 8/8/2018 8/8/2017    ALT Routine 12/1/2017 2/1/2018 8/8/2017    AST Routine 12/1/2017 2/1/2018 8/8/2017    T4 free Routine 12/1/2017 2/1/2018 8/8/2017    Albumin Random Urine Quantitative Routine 12/1/2017 2/1/2018 8/8/2017    Creatinine Routine 12/1/2017 2/1/2018 8/8/2017    Lipid Profile Routine 12/1/2017 2/1/2018 8/8/2017    TSH Routine 12/1/2017 2/1/2018 8/8/2017    Potassium  "Routine 12/1/2017 2/1/2018 8/8/2017            Who to contact     Please call your clinic at [phone] to:    Ask questions about your health    Make or cancel appointments    Discuss your medicines    Learn about your test results    Speak to your doctor   If you have compliments or concerns about an experience at your clinic, or if you wish to file a complaint, please contact [facility] Patient Relations at [phone] or email us at [email or web address]         Additional Information About Your Visit        [employer] Information     [employer] gives you secure access to your electronic health record. If you see a primary care provider, you can also send messages to your care team and make appointments. If you have questions, please call your primary care clinic.  If you do not have a primary care provider, please call [phone] and they will assist you.      [employer] is an electronic gateway that provides easy, online access to your medical records. With [employer], you can request a clinic appointment, read your test results, renew a prescription or communicate with your care team.     To access your existing account, please contact your [facility] or call [phone] for assistance.        Care EveryWhere ID     This is your Care EveryWhere ID. This could be used by other organizations to access your [facility] medical records  [id number]        Your Vitals Were     Pulse Height BMI (Body Mass Index)             83 1.854 m (6' 1\") 18.79 kg/m2          Blood Pressure from Last 3 Encounters:   08/08/17 116/78   02/07/17 129/84   01/17/17 116/79    Weight from Last 3 Encounters:   08/08/17 64.6 kg (142 lb 6.4 oz)   02/07/17 65.6 kg (144 lb 9.6 oz)   01/17/17 66.9 kg (147 lb 6.4 oz)              We Performed the Following     Hemoglobin A1c POCT          Today's Medication Changes          These changes are accurate as of: 8/8/17  1:17 PM.  If you " have any questions, ask your nurse or doctor.               These medicines have changed or have updated prescriptions.        Dose/Directions    blood glucose monitoring lancets   This may have changed:    - how much to take  - how to take this  - when to take this  - additional instructions   Used for:  Type 1 diabetes mellitus with hyperglycemia (H)        Use 2 times daily.   Quantity:  250 each   Refills:  3            Where to get your medicines      These medications were sent to [place], [place] - [address]  [address], [facility] [address]     Phone:  [phone]     blood glucose monitoring lancets                Primary Care Provider Office Phone # Fax #    Rosamaria Ronda Banerjee [phone]       [facility] [address]  [facility] [address]        Equal Access to Services     SANJU THIBODEAUX : Luzmaria gayleo Somecca, waaxda luqadaha, qaybta kaalmada adeegyada, indira hassan . So [facility] [phone].    ATENCIÓN: Si habla español, tiene a doll disposición servicios gratuitos de asistencia lingüística. Llame al [phone].    We comply with applicable federal civil rights laws and [place] laws. We do not discriminate on the basis of race, color, national origin, age, disability sex, sexual orientation or gender identity.            Thank you!     Thank you for choosing [facility] ENDOCRINOLOGY  for your care. Our goal is always to provide you with excellent care. Hearing back from our patients is one way we can continue to improve our services. Please take a few minutes to complete the written survey that you may receive in the mail after your visit with us. Thank you!             Your Updated Medication List - Protect others around you: Learn how to safely use, store and throw away your medicines at www.disposemymeds.org.          This list is accurate as of: 8/8/17  1:17 PM.  Always use your most  recent med list.                   Brand Name Dispense Instructions for use Diagnosis    BENADRYL ALLERGY PO      Take 1 tablet by mouth daily        blood glucose monitoring lancets     250 each    Use 2 times daily.    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), managed as type 1 (H)       fluticasone 50 MCG/ACT spray    FLONASE    16 g    Spray 2 sprays into both nostrils daily    Seasonal allergic rhinitis       metFORMIN 500 MG 24 hr tablet    GLUCOPHAGE-XR    360 tablet    Take 2 tablets (1,000 mg) by mouth 2 times daily (with meals)    SAMANTA (latent autoimmune diabetes mellitus in adults) (H)       omeprazole 20 MG CR capsule    priLOSEC    30 capsule    Take 20 mg daily    RUQ abdominal pain       polyethylene glycol powder    MIRALAX    510 g    Take 17 g (1 capful) by mouth 2 times daily as needed for constipation    Constipation, unspecified constipation type

## 2017-08-08 NOTE — PROGRESS NOTES
HPI  Piter Conway is a 42 year old male with SAMANTA here today for a follow up visit.  He was found to have an A1C of 10.6 % one year ago.  His EJ was + and C-peptide 1.0.   Pt is currently taking Metformin 1000 mg po BID.  Pt made significant changes in his lifestyle and is eating healthy and exercising.  His A1C is 5.7 % today and his previous A1C was 5.8 %.   We downloaded his glucose meter today and his blood sugar values are good with an average glucose of 124 with SD 13.  No hypoglycemia.  On ROS today, he no longer has abdominal pain or constipation.  Pt denies blurred vision, n/v, SOB, cough, chest pain, dysuria or hematuria.  No foot ulcers.  He denies numbness, tingling or pain in his feet or hands.    Diabetes Care  Retinopathy:yes; he was seen by Oph here in 4/2017. No changes.   Nephropathy:none; pt's urine microalbuminuria negative in Oct 2016.  Neuropathy:none.  Foot Exam:no ulcers; normal monofilamentous exam.  Taking aspirin:no  Lipids: LDL 71 in Oct 2016.    ROS  Please see under HPI.    Allergies  No Known Allergies    Medications  Current Outpatient Prescriptions   Medication Sig Dispense Refill     blood glucose monitoring (ONE TOUCH DELICA) lancets Use 2 times daily. 250 each 3     metFORMIN (GLUCOPHAGE-XR) 500 MG 24 hr tablet Take 2 tablets (1,000 mg) by mouth 2 times daily (with meals) 360 tablet 3     blood glucose monitoring (ONE TOUCH VERIO IQ) test strip Test blood sugar twice daily. 180 each 3     polyethylene glycol (MIRALAX) powder Take 17 g (1 capful) by mouth 2 times daily as needed for constipation 510 g 1     blood glucose monitoring (ONETOUCH VERIO) meter device kit Use to test blood sugar 1 kit 0     fluticasone (FLONASE) 50 MCG/ACT nasal spray Spray 2 sprays into both nostrils daily 16 g 3     omeprazole (PRILOSEC) 20 MG capsule Take 20 mg daily 30 capsule 2     DiphenhydrAMINE HCl (BENADRYL ALLERGY PO) Take 1 tablet by mouth daily         Family History  family history includes  "DIABETES in his father; Hypertension in his father. There is no history of Glaucoma or Macular Degeneration.    Social History  Smoke: none.  ETOH: none.    Past Medical History  1. SAMANTA - dx 2014.    Physical Exam  /78 (BP Location: Right arm, Patient Position: Sitting, Cuff Size: Adult Regular)  Pulse 83  Ht 1.854 m (6' 1\")  Wt 64.6 kg (142 lb 6.4 oz)  BMI 18.79 kg/m2  Body mass index is 18.79 kg/(m^2).    GENERAL : In no apparent distress.  SKIN: Normal.  EYES: PERRLA.    MOUTH: Moist.  NECK: No goiter.  RESP: Lungs clear to auscultation.  CARDIAC: RRR.   ABDOMEN: Nontender.   NEURO: awake, alert, responds appropriately to questions.    Moves all extremities; Gait normal.  No tremor.    EXTREMITIES: No edema.  FEET:  No ulcers; normal monofilamentous exam.    RESULTS  Creatinine   Date Value Ref Range Status   10/11/2016 0.83 0.66 - 1.25 mg/dL Final     GFR Estimate   Date Value Ref Range Status   10/11/2016 >90  Non  GFR Calc   >60 mL/min/1.7m2 Final     Hemoglobin A1C   Date Value Ref Range Status   06/26/2014 10.6 (H) 4.1 - 5.7 % Final     Potassium   Date Value Ref Range Status   10/11/2016 4.4 3.4 - 5.3 mmol/L Final     ALT   Date Value Ref Range Status   10/11/2016 15 0 - 70 U/L Final     AST   Date Value Ref Range Status   10/11/2016 10 0 - 45 U/L Final     TSH   Date Value Ref Range Status   10/11/2016 0.97 0.40 - 4.00 mU/L Final     T4 Free   Date Value Ref Range Status   10/11/2016 1.07 0.76 - 1.46 ng/dL Final       Cholesterol   Date Value Ref Range Status   10/11/2016 127 <200 mg/dL Final   08/27/2015 132 <200 mg/dL Final     Comment:     LDL Cholesterol is the primary guide to therapy.   The NCEP recommends further evaluation of: patients with cholesterol greater   than 200 mg/dL if additional risk factors are present, cholesterol greater   than   240 mg/dL, triglycerides greater than 150 mg/dL, or HDL less than 40 mg/dL.       HDL Cholesterol   Date Value Ref Range Status "   10/11/2016 45 >39 mg/dL Final   08/27/2015 44 >40 mg/dL Final     LDL Cholesterol Calculated   Date Value Ref Range Status   10/11/2016 71 <100 mg/dL Final     Comment:     Desirable:       <100 mg/dl   08/27/2015 79 0 - 129 mg/dL Final     Comment:     LDL Cholesterol is the primary guide to therapy: LDL-cholesterol goal in high   risk patients is <100 mg/dL and in very high risk patients is <70 mg/dL.       Triglycerides   Date Value Ref Range Status   10/11/2016 56 <150 mg/dL Final   08/27/2015 47 0 - 150 mg/dL Final     Cholesterol/HDL Ratio   Date Value Ref Range Status   08/27/2015 3.0 0.0 - 5.0 Final   09/11/2014 2.8 0.0 - 5.0 Final     A1C      5.7     8/8/2017  A1C      5.8    2/7/2017  A1C      6.0    10/11/2016  A1C      5.7    6/7/2016  A1C      6.3    3/4/2016  A1C      6.0    10/2/2015  A1C      6.6    4/2015  A1C     10.6   6/26/2014  A1C      6.0   2/6/2013  A1C      5.6   3/20/2012    ASSESSMENT/PLAN:    1.  SAMANTA:  Good glycemic control at this time with lifestyle changes and Metformin.  No need for insulin at this time.  I asked him to continue to check his blood sugar BID.  He is to remain on Metformin.  Pt is to notify us if he has FBS values > 130 or 2 hr postprandial blood sugar values are > 180.  He is also to notify us if he develops polydipsia, polyuria, or blurred vision.  Encouraged pt to continues to eat healthy and exercise.  Pt is normotensive.  Creat is 0.83 with GFR > 90 mL/min in Oct 2016.  His urine microalbuminuria was negative in 10/2016.  Feet are in good condition with normal monofilamentous exam.  TSH also normal in Oct 2016.  Pt's LDL 71 in 10/2016.  I placed an order for fasting labs to be done prior to his next visit.    2. MILD NPDR: Pt seen by Oph here in 3/2016 with mild NPDR.  Most recent Oph exam stable in 4/2017.    3.  Return to Endocrine Clinic to see Dr. Paul in 3-4 months or sooner if he develops symptoms of hyperglycemia or blood sugar values above  target.  He is aware that he will need insulin RX at some time in the near future.

## 2017-08-08 NOTE — LETTER
8/8/2017       RE: Piter Conway  [address]   [facility] [address]     Dear Colleague,    Thank you for referring your patient, Piter Conway, to the [facility] ENDOCRINOLOGY at [facility]. Please see a copy of my visit note below.    HPI  Piter Conway is a 42 year old male with SAMANTA here today for a follow up visit.  He was found to have an A1C of 10.6 % one year ago.  His EJ was + and C-peptide 1.0.   Pt is currently taking Metformin 1000 mg po BID.  Pt made significant changes in his lifestyle and is eating healthy and exercising.  His A1C is 5.7 % today and his previous A1C was 5.8 %.   We downloaded his glucose meter today and his blood sugar values are good with an average glucose of 124 with SD 13.  No hypoglycemia.  On ROS today, he no longer has abdominal pain or constipation.  Pt denies blurred vision, n/v, SOB, cough, chest pain, dysuria or hematuria.  No foot ulcers.  He denies numbness, tingling or pain in his feet or hands.    Diabetes Care  Retinopathy:yes; he was seen by Oph here in 4/2017. No changes.   Nephropathy:none; pt's urine microalbuminuria negative in Oct 2016.  Neuropathy:none.  Foot Exam:no ulcers; normal monofilamentous exam.  Taking aspirin:no  Lipids: LDL 71 in Oct 2016.    ROS  Please see under HPI.    Allergies  No Known Allergies    Medications  Current Outpatient Prescriptions   Medication Sig Dispense Refill     blood glucose monitoring (ONE TOUCH DELICA) lancets Use 2 times daily. 250 each 3     metFORMIN (GLUCOPHAGE-XR) 500 MG 24 hr tablet Take 2 tablets (1,000 mg) by mouth 2 times daily (with meals) 360 tablet 3     blood glucose monitoring (ONE TOUCH VERIO IQ) test strip Test blood sugar twice daily. 180 each 3     polyethylene glycol (MIRALAX) powder Take 17 g (1 capful) by mouth 2 times daily as needed for constipation 510 g 1     blood glucose monitoring (ONETOUCH VERIO) meter device kit Use to test blood sugar 1 kit 0      "fluticasone (FLONASE) 50 MCG/ACT nasal spray Spray 2 sprays into both nostrils daily 16 g 3     omeprazole (PRILOSEC) 20 MG capsule Take 20 mg daily 30 capsule 2     DiphenhydrAMINE HCl (BENADRYL ALLERGY PO) Take 1 tablet by mouth daily         Family History  family history includes DIABETES in his father; Hypertension in his father. There is no history of Glaucoma or Macular Degeneration.    Social History  Smoke: none.  ETOH: none.    Past Medical History  1. SAMANTA - dx 2014.    Physical Exam  /78 (BP Location: Right arm, Patient Position: Sitting, Cuff Size: Adult Regular)  Pulse 83  Ht 1.854 m (6' 1\")  Wt 64.6 kg (142 lb 6.4 oz)  BMI 18.79 kg/m2  Body mass index is 18.79 kg/(m^2).    GENERAL : In no apparent distress.  SKIN: Normal.  EYES: PERRLA.    MOUTH: Moist.  NECK: No goiter.  RESP: Lungs clear to auscultation.  CARDIAC: RRR.   ABDOMEN: Nontender.   NEURO: awake, alert, responds appropriately to questions.    Moves all extremities; Gait normal.  No tremor.    EXTREMITIES: No edema.  FEET:  No ulcers; normal monofilamentous exam.    RESULTS  Creatinine   Date Value Ref Range Status   10/11/2016 0.83 0.66 - 1.25 mg/dL Final     GFR Estimate   Date Value Ref Range Status   10/11/2016 >90  Non  GFR Calc   >60 mL/min/1.7m2 Final     Hemoglobin A1C   Date Value Ref Range Status   06/26/2014 10.6 (H) 4.1 - 5.7 % Final     Potassium   Date Value Ref Range Status   10/11/2016 4.4 3.4 - 5.3 mmol/L Final     ALT   Date Value Ref Range Status   10/11/2016 15 0 - 70 U/L Final     AST   Date Value Ref Range Status   10/11/2016 10 0 - 45 U/L Final     TSH   Date Value Ref Range Status   10/11/2016 0.97 0.40 - 4.00 mU/L Final     T4 Free   Date Value Ref Range Status   10/11/2016 1.07 0.76 - 1.46 ng/dL Final       Cholesterol   Date Value Ref Range Status   10/11/2016 127 <200 mg/dL Final   08/27/2015 132 <200 mg/dL Final     Comment:     LDL Cholesterol is the primary guide to therapy.   The " NCEP recommends further evaluation of: patients with cholesterol greater   than 200 mg/dL if additional risk factors are present, cholesterol greater   than   240 mg/dL, triglycerides greater than 150 mg/dL, or HDL less than 40 mg/dL.       HDL Cholesterol   Date Value Ref Range Status   10/11/2016 45 >39 mg/dL Final   08/27/2015 44 >40 mg/dL Final     LDL Cholesterol Calculated   Date Value Ref Range Status   10/11/2016 71 <100 mg/dL Final     Comment:     Desirable:       <100 mg/dl   08/27/2015 79 0 - 129 mg/dL Final     Comment:     LDL Cholesterol is the primary guide to therapy: LDL-cholesterol goal in high   risk patients is <100 mg/dL and in very high risk patients is <70 mg/dL.       Triglycerides   Date Value Ref Range Status   10/11/2016 56 <150 mg/dL Final   08/27/2015 47 0 - 150 mg/dL Final     Cholesterol/HDL Ratio   Date Value Ref Range Status   08/27/2015 3.0 0.0 - 5.0 Final   09/11/2014 2.8 0.0 - 5.0 Final     A1C      5.7     8/8/2017  A1C      5.8    2/7/2017  A1C      6.0    10/11/2016  A1C      5.7    6/7/2016  A1C      6.3    3/4/2016  A1C      6.0    10/2/2015  A1C      6.6    4/2015  A1C     10.6   6/26/2014  A1C      6.0   2/6/2013  A1C      5.6   3/20/2012    ASSESSMENT/PLAN:    1.  SAMANTA:  Good glycemic control at this time with lifestyle changes and Metformin.  No need for insulin at this time.  I asked him to continue to check his blood sugar BID.  He is to remain on Metformin.  Pt is to notify us if he has FBS values > 130 or 2 hr postprandial blood sugar values are > 180.  He is also to notify us if he develops polydipsia, polyuria, or blurred vision.  Encouraged pt to continues to eat healthy and exercise.  Pt is normotensive.  Creat is 0.83 with GFR > 90 mL/min in Oct 2016.  His urine microalbuminuria was negative in 10/2016.  Feet are in good condition with normal monofilamentous exam.  TSH also normal in Oct 2016.  Pt's LDL 71 in 10/2016.  I placed an order for fasting labs to be  done prior to his next visit.    2. MILD NPDR: Pt seen by Oph here in 3/2016 with mild NPDR.  Most recent Oph exam stable in 4/2017.    3.  Return to Endocrine Clinic to see Dr. Paul in 3-4 months or sooner if he develops symptoms of hyperglycemia or blood sugar values above target.  He is aware that he will need insulin RX at some time in the near future.        Sheila Brewer PA-C

## 2017-08-12 NOTE — NURSING NOTE
"Performed A1C test - patient tolerated well.    Nguyen Ramirez CMA         Chief Complaint   Patient presents with     RECHECK     f/u for SAMANTA diabetes        Initial /84 mmHg  Pulse 74  Ht 1.854 m (6' 1\")  Wt 65.59 kg (144 lb 9.6 oz)  BMI 19.08 kg/m2 Estimated body mass index is 19.08 kg/(m^2) as calculated from the following:    Height as of this encounter: 1.854 m (6' 1\").    Weight as of this encounter: 65.59 kg (144 lb 9.6 oz).  Medication Reconciliation: complete     Nguyen Ramirez cma     "
Report given to Odette DAVENPORT

## 2017-10-26 DIAGNOSIS — R10.11 RUQ ABDOMINAL PAIN: ICD-10-CM

## 2017-10-26 NOTE — TELEPHONE ENCOUNTER
Last clinic visit 04/21/2016, I made him an appointment for November 1st.  Hasn't taken the Omprazole for about 3 weeks, starting to have some stomach issues again.

## 2017-11-01 ENCOUNTER — OFFICE VISIT (OUTPATIENT)
Dept: FAMILY MEDICINE | Facility: CLINIC | Age: 42
End: 2017-11-01

## 2017-11-01 VITALS
BODY MASS INDEX: 19.15 KG/M2 | HEART RATE: 64 BPM | WEIGHT: 145.12 LBS | DIASTOLIC BLOOD PRESSURE: 79 MMHG | TEMPERATURE: 97.5 F | SYSTOLIC BLOOD PRESSURE: 115 MMHG | OXYGEN SATURATION: 98 %

## 2017-11-01 DIAGNOSIS — K21.9 GASTROESOPHAGEAL REFLUX DISEASE WITHOUT ESOPHAGITIS: Primary | ICD-10-CM

## 2017-11-01 DIAGNOSIS — K21.9 GASTROESOPHAGEAL REFLUX DISEASE WITHOUT ESOPHAGITIS: ICD-10-CM

## 2017-11-01 DIAGNOSIS — Z23 NEED FOR PROPHYLACTIC VACCINATION AND INOCULATION AGAINST INFLUENZA: ICD-10-CM

## 2017-11-01 ASSESSMENT — PAIN SCALES - GENERAL: PAINLEVEL: NO PAIN (0)

## 2017-11-01 NOTE — NURSING NOTE
42 year old  Chief Complaint   Patient presents with     Recheck Medication     FU heartburn medication. Heartburn has improved.        Blood pressure 115/79, pulse 64, temperature 97.5  F (36.4  C), temperature source Oral, weight 145 lb 1.9 oz (65.8 kg), SpO2 98 %. Body mass index is 19.15 kg/(m^2).  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)       Wt Readings from Last 2 Encounters:   11/01/17 145 lb 1.9 oz (65.8 kg)   08/08/17 142 lb 6.4 oz (64.6 kg)     BP Readings from Last 3 Encounters:   11/01/17 115/79   08/08/17 116/78   02/07/17 129/84         Current Outpatient Prescriptions   Medication     omeprazole (PRILOSEC) 20 MG CR capsule     blood glucose monitoring (ONE TOUCH DELICA) lancets     metFORMIN (GLUCOPHAGE-XR) 500 MG 24 hr tablet     blood glucose monitoring (ONE TOUCH VERIO IQ) test strip     polyethylene glycol (MIRALAX) powder     blood glucose monitoring (ONETOUCH VERIO) meter device kit     fluticasone (FLONASE) 50 MCG/ACT nasal spray     DiphenhydrAMINE HCl (BENADRYL ALLERGY PO)     No current facility-administered medications for this visit.        Social History   Substance Use Topics     Smoking status: Never Smoker     Smokeless tobacco: Not on file     Alcohol use No       Health Maintenance Due   Topic Date Due     FOOT EXAM Q1 YEAR  01/01/1976     PNEUMOVAX 1X HI RISK PATIENT < 65 (NO IB MSG)  01/01/1977     INFLUENZA VACCINE (SYSTEM ASSIGNED)  09/01/2017     CREATININE Q1 YEAR  10/11/2017     LIPID MONITORING Q1 YEAR  10/11/2017     MICROALBUMIN Q1 YEAR  10/11/2017       No results found for: MELVIN Rodriges MA  November 1, 2017 9:28 AM

## 2017-11-01 NOTE — PATIENT INSTRUCTIONS
Omeprazole  20mg dose  Take one daily for the next month, whether you are having symptoms or not    But    If you are having trouble with swallowing then contact me, you may need an EGD (upper endoscopy).

## 2017-11-01 NOTE — PROGRESS NOTES
Piter Conway is a 42 year old male here for the following issues:    Abdominal pain  Piter has a previous history of gastritis. This waxes and wanes. He was seen in the past for evaluation of RUQ pain. He had nornmal US and negative HIDA. I referred him to the GI clinic. He was found to have constipation and now uses Miralax daily. That has helped a lot. However heartburn has returned and is triggered by spicy food and citrus.  No black stool. Occasional dysphagia, discussed EGD if symptoms not improving. He is in interested in resuming medications. He uses only tylenol. Drinks one cup of tea a day. No Etoh and nonsmoker.     Need for prophylactic vaccination and inoculation against influenza  He is requesting flu vaccine today.    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 250 each 3     metFORMIN (GLUCOPHAGE-XR) 500 MG 24 hr tablet Take 2 tablets (1,000 mg) by mouth 2 times daily (with meals) 360 tablet 3     blood glucose monitoring (ONE TOUCH VERIO IQ) test strip Test blood sugar twice daily. 180 each 3     polyethylene glycol (MIRALAX) powder Take 17 g (1 capful) by mouth 2 times daily as needed for constipation 510 g 1     blood glucose monitoring (ONETOUCH VERIO) meter device kit Use to test blood sugar 1 kit 0     fluticasone (FLONASE) 50 MCG/ACT nasal spray Spray 2 sprays into both nostrils daily 16 g 3     DiphenhydrAMINE HCl (BENADRYL ALLERGY PO) Take 1 tablet by mouth daily         No Known Allergies     EXAM  /79 (BP Location: Right arm, Patient Position: Chair, Cuff Size: Adult Regular)  Pulse 64  Temp 97.5  F (36.4  C) (Oral)  Wt 145 lb 1.9 oz  (65.8 kg)  SpO2 98%  BMI 19.15 kg/m2  Gen: Alert, pleasant, NAD  COR: S1,S2, no murmur  Lungs: CTA bilaterally, no rhonchi, wheezes or rales  Abdomen: soft, midepigastric tenderness, normal bowel sounds, no HSM      Assessment:  (K21.9) Gastroesophageal reflux disease without esophagitis  (primary encounter diagnosis)  Comment: return of gastritis  Plan: omeprazole (PRILOSEC) 20 MG CR capsule, H         Pylori antigen stool, CANCELED: H. Pylori         Antibody IGG  (LabDAQ)        Will check for H Pylori. Some intermittent dysphagia. Trial of omeprazole as below   Patient Instructions   Omeprazole  20mg dose  Take one daily for the next month, whether you are having symptoms or not    But    If you are having trouble with swallowing then contact me, you may need an EGD (upper endoscopy).  ADDENDUM: stool was negative for H Pylori    (Z23) Need for prophylactic vaccination and inoculation against influenza  Comment: per pt request  Plan: HC FLU VAC PRESRV FREE QUAD SPLIT VIR 3+YRS IM,        ADMIN INFLUENZA VIRUS VACCINE        given    Rosamaria Banerjee MD  Internal Medicine/Pediatrics

## 2017-11-01 NOTE — NURSING NOTE
"Injectable Influenza Immunization Documentation    1.  Has the patient received the information for the injectable influenza vaccine? NO     2. Is the patient 6 months of age or older? YES     3. Does the patient have any of the following contraindications?         Severe allergy to eggs? No     Severe allergic reaction to previous influenza vaccines? No   Severe allergy to latex? No       History of Guillain-Botkins syndrome? No     Currently have a temperature greater than 100.4F? No        4.  Severely egg allergic patients should have flu vaccine eligibility assessed by an MD, RN, or pharmacist, and those who received flu vaccine should be observed for 15 min by an MD, RN, Pharmacist, Medical Technician, or member of clinic staff.\": NO    5. Latex-allergic patients should be given latex-free influenza vaccine No. Please reference the Vaccine latex table to determine if your clinic s product is latex-containing.       Vaccination given by Paz Rodriges MA          "

## 2017-11-01 NOTE — MR AVS SNAPSHOT
After Visit Summary   11/1/2017    Piter Conway    MRN: [id number]           Patient Information     Date Of Birth          1975        Visit Information        Provider Department      11/1/2017 9:20 AM Rosamaria Banerjee MD [facility]        Today's Diagnoses     Gastroesophageal reflux disease without esophagitis    -  1    Need for prophylactic vaccination and inoculation against influenza          Care Instructions    Omeprazole  20mg dose  Take one daily for the next month, whether you are having symptoms or not    But    If you are having trouble with swallowing then contact me, you may need an EGD (upper endoscopy).          Follow-ups after your visit        Your next 10 appointments already scheduled     Dec 11, 2017 12:00 PM CST   LAB with  LAB   [facility] ([facility])    [address] [place] [address] [phone]           Please do not eat 10-12 hours before your appointment if you are coming in fasting for labs on lipids, cholesterol, or glucose (sugar). This does not apply to pregnant women. Water, hot tea and black coffee (with nothing added) are okay. Do not drink other fluids, diet soda or chew gum.            Dec 11, 2017 12:30 PM CST   (Arrive by 12:15 PM)   RETURN DIABETES with Elizabeth Scales MD   [facility] Endocrinology ([facility])    [address] [place] [address] [phone]              Who to contact     Please call your clinic at [phone] to:    Ask questions about your health    Make or cancel appointments    Discuss your medicines    Learn about your test results    Speak to your doctor   If you have compliments or concerns about an experience at your clinic, or if you wish to file a complaint, please contact [facility] Patient Relations at [phone] or email us at [email or web address]          Additional Information About Your Visit        [email or web address] Information     [employer] gives you secure access to your electronic health record. If you see a primary care provider, you can also send messages to your care team and make appointments. If you have questions, please call your primary care clinic.  If you do not have a primary care provider, please call [phone] and they will assist you.      [employer] is an electronic gateway that provides easy, online access to your medical records. With [employer], you can request a clinic appointment, read your test results, renew a prescription or communicate with your care team.     To access your existing account, please contact your [facility] or call [phone] for assistance.        Care EveryWhere ID     This is your Care EveryWhere ID. This could be used by other organizations to access your [facility] medical records  [id number]        Your Vitals Were     Pulse Temperature Pulse Oximetry BMI (Body Mass Index)          64 97.5  F (36.4  C) (Oral) 98% 19.15 kg/m2         Blood Pressure from Last 3 Encounters:   11/01/17 115/79   08/08/17 116/78   02/07/17 129/84    Weight from Last 3 Encounters:   11/01/17 145 lb 1.9 oz (65.8 kg)   08/08/17 142 lb 6.4 oz (64.6 kg)   02/07/17 144 lb 9.6 oz (65.6 kg)              We Performed the Following     ADMIN INFLUENZA VIRUS VACCINE     H. Pylori Antibody IGG  (LabDAQ)     HC FLU VAC PRESRV FREE QUAD SPLIT VIR 3+YRS IM          Where to get your medicines      These medications were sent to [facility] [place], [place] - [address]  [address] [facility] [address], [facility] [address]     Phone:  [phone]     omeprazole 20 MG CR capsule          Primary Care Provider Office Phone # Fax #    Rosamaria Banerjee [phone]       [address]  [facility] [address]        Equal Access to Services     SANJU THIBODEAUX AH: krish Singer  melia angigalileo cabreraindira mejia. So [facility] [phone].    ATENCIÓN: Si demond fregoso, tiene a doll disposición servicios gratuitos de asistencia lingüística. Jonathan al [phone].    We comply with applicable federal civil rights laws and [place] laws. We do not discriminate on the basis of race, color, national origin, age, disability, sex, sexual orientation, or gender identity.            Thank you!     Thank you for choosing [facility]  for your care. Our goal is always to provide you with excellent care. Hearing back from our patients is one way we can continue to improve our services. Please take a few minutes to complete the written survey that you may receive in the mail after your visit with us. Thank you!             Your Updated Medication List - Protect others around you: Learn how to safely use, store and throw away your medicines at www.disposemymeds.org.          This list is accurate as of: 11/1/17  9:56 AM.  Always use your most recent med list.                   Brand Name Dispense Instructions for use Diagnosis    BENADRYL ALLERGY PO      Take 1 tablet by mouth daily        blood glucose monitoring lancets     250 each    Use 2 times daily.    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

## 2017-11-02 LAB
H PYLORI AG STL QL IA: NORMAL
SPECIMEN SOURCE: NORMAL

## 2017-12-11 ENCOUNTER — OFFICE VISIT (OUTPATIENT)
Dept: ENDOCRINOLOGY | Facility: CLINIC | Age: 42
End: 2017-12-11

## 2017-12-11 VITALS — HEIGHT: 73 IN | BODY MASS INDEX: 19.35 KG/M2 | WEIGHT: 146 LBS

## 2017-12-11 DIAGNOSIS — R14.2 FLATULENCE, ERUCTATION, AND GAS PAIN: Primary | ICD-10-CM

## 2017-12-11 DIAGNOSIS — R14.2 FLATULENCE, ERUCTATION, AND GAS PAIN: ICD-10-CM

## 2017-12-11 DIAGNOSIS — R14.3 FLATULENCE, ERUCTATION, AND GAS PAIN: ICD-10-CM

## 2017-12-11 DIAGNOSIS — R14.1 FLATULENCE, ERUCTATION, AND GAS PAIN: Primary | ICD-10-CM

## 2017-12-11 DIAGNOSIS — E10.65 TYPE 1 DIABETES MELLITUS WITH HYPERGLYCEMIA (H): ICD-10-CM

## 2017-12-11 DIAGNOSIS — R14.1 FLATULENCE, ERUCTATION, AND GAS PAIN: ICD-10-CM

## 2017-12-11 DIAGNOSIS — R14.3 FLATULENCE, ERUCTATION, AND GAS PAIN: Primary | ICD-10-CM

## 2017-12-11 LAB
ALT SERPL W P-5'-P-CCNC: 17 U/L (ref 0–70)
AST SERPL W P-5'-P-CCNC: 13 U/L (ref 0–45)
CHOLEST SERPL-MCNC: 103 MG/DL
CREAT SERPL-MCNC: 0.93 MG/DL (ref 0.66–1.25)
CREAT UR-MCNC: 97 MG/DL
GFR SERPL CREATININE-BSD FRML MDRD: 88 ML/MIN/1.7M2
HBA1C MFR BLD: 6.3 % (ref 4.3–6)
HDLC SERPL-MCNC: 39 MG/DL
LDLC SERPL CALC-MCNC: 55 MG/DL
MICROALBUMIN UR-MCNC: <5 MG/L
MICROALBUMIN/CREAT UR: NORMAL MG/G CR (ref 0–17)
NONHDLC SERPL-MCNC: 64 MG/DL
POTASSIUM SERPL-SCNC: 4.2 MMOL/L (ref 3.4–5.3)
T4 FREE SERPL-MCNC: 1.12 NG/DL (ref 0.76–1.46)
TRIGL SERPL-MCNC: 44 MG/DL
TSH SERPL DL<=0.005 MIU/L-ACNC: 1.05 MU/L (ref 0.4–4)

## 2017-12-11 ASSESSMENT — PAIN SCALES - GENERAL: PAINLEVEL: NO PAIN (0)

## 2017-12-11 NOTE — NURSING NOTE
Chief Complaint   Patient presents with     RECHECK     F/U SAMANTA Carroll, CMA  Endocrinology & Diabetes 3G

## 2017-12-11 NOTE — LETTER
2017       RE: Piter Conway  [address]   [facility] [address]     Dear Colleague,    Thank you for referring your patient, Piter Conway, to the [facility] ENDOCRINOLOGY at [facility]. Please see a copy of my visit note below.    Endocrinology Clinic Visit    2017     Referred by: Stephie Brewer. Initial visit.     Subjective:         HPI: Piter Conway is a pleasant 42 year old male who is here for a clinic visit.   He  has a past medical history of Diabetes (H) () and History of hepatitis A ().    Prediabetes in  with A1C 6.0%, did start exercising and have healthy food.   In , had fatigue, polyuria, polydipsia, lost 10 lbs. BG close to 400 A1c10.6%, positive EJ and C-peptide 1.0. There was 1+ urine ketone too. He was started on metformin   Lab showed positive EJ antibody.     He has family hx of DM in his father and brother. His older brother  of DM complications likely heart attack.    History of Diabetes  SAMANTA diagnosed in .     Complications  No chronic complications.       Treatment  Diabetes Medication(s)     Biguanides Sig    metFORMIN (GLUCOPHAGE-XR) 500 MG 24 hr tablet Take 2 tablets (1,000 mg) by mouth 2 times daily (with meals)            Prevention  Lipid  LDL Cholesterol Calculated   Date Value Ref Range Status   10/11/2016 71 <100 mg/dL Final     Comment:     Desirable:       <100 mg/dl   2015 79 0 - 129 mg/dL Final     Comment:     LDL Cholesterol is the primary guide to therapy: LDL-cholesterol goal in high   risk patients is <100 mg/dL and in very high risk patients is <70 mg/dL.         Renal  Not on ARB, no microalbuminuria      Weight  Wt Readings from Last 4 Encounters:   17 65.8 kg (145 lb 1.9 oz)   17 64.6 kg (142 lb 6.4 oz)   17 65.6 kg (144 lb 9.6 oz)   17 66.9 kg (147 lb 6.4 oz)     Meter Download Summary:   Did not bring meter    Exercise Yes, daily push ups, sit ups.     Weight  trend  Wt Readings from Last 4 Encounters:   11/01/17 65.8 kg (145 lb 1.9 oz)   08/08/17 64.6 kg (142 lb 6.4 oz)   02/07/17 65.6 kg (144 lb 9.6 oz)   01/17/17 66.9 kg (147 lb 6.4 oz)       A1c today is 6.3  Lab Results   Component Value Date    A1C 10.6 06/26/2014    A1C 6.0 02/06/2013    A1C 5.6 03/20/2012       Barriers to achieve glycemic goals:   None identified.      No Known Allergies    Current Outpatient Prescriptions   Medication Sig Dispense Refill     omeprazole (PRILOSEC) 20 MG CR capsule Take 20 mg daily 90 capsule 1     blood glucose monitoring (ONE TOUCH DELICA) lancets Use 2 times daily. 250 each 3     metFORMIN (GLUCOPHAGE-XR) 500 MG 24 hr tablet Take 2 tablets (1,000 mg) by mouth 2 times daily (with meals) 360 tablet 3     blood glucose monitoring (ONE TOUCH VERIO IQ) test strip Test blood sugar twice daily. 180 each 3     polyethylene glycol (MIRALAX) powder Take 17 g (1 capful) by mouth 2 times daily as needed for constipation 510 g 1     blood glucose monitoring (ONETOUCH VERIO) meter device kit Use to test blood sugar 1 kit 0     fluticasone (FLONASE) 50 MCG/ACT nasal spray Spray 2 sprays into both nostrils daily 16 g 3     DiphenhydrAMINE HCl (BENADRYL ALLERGY PO) Take 1 tablet by mouth daily         Review of Systems   Constitutional: Negative for fever and unexpected weight change. Appetite is normal.   Head: no headache   Eye: no vision change/ loss of peripheral vision.   ENT: No throat congestion.   Respiratory: no cough   Cardiovascular: no chest pain or tachycardia  Gastrointestinal: Negative for vomiting, abdominal pain and diarrhea.  Genitourinary: No bladder problems.  Musculoskeletal: Negative for myalgias. No weakness.  Neurological: Negative for seizures and headaches.  Psychiatric/Behavioral: Negative for behavioral problem and dysphoric mood.    Past Medical History:   Diagnosis Date     Diabetes (H) 2014     History of hepatitis A 2001       Past Surgical History:   Procedure  "Laterality Date     NO HISTORY OF SURGERY         Family History   Problem Relation Age of Onset     Hypertension Father      DIABETES Father      Glaucoma No family hx of      Macular Degeneration No family hx of        Social History     Social History     Marital status:      Spouse name: N/A     Number of children: N/A     Years of education: N/A     Social History Main Topics     Smoking status: Never Smoker     Smokeless tobacco: Not on file     Alcohol use No     Drug use: No     Sexual activity: Yes     Partners: Female      Comment:      Other Topics Concern     Not on file     Social History Narrative       Objective:   BP (P) 112/80  Pulse (P) 73  Ht 1.854 m (6' 1\")  Wt 66.2 kg (146 lb)  BMI 19.26 kg/m2   Constitutional: Thin male, no distress.    EYES: anicteric, normal extra-ocular movements, no lid lag or retraction   HEENT: Mouth/Throat: Mucous membrane is moist. Oropharynx is clear. No adenopathy. Normal thyroid, well palpable.   Cardiovascular: RRR, S1, S2 normal. No m/g/r   Pulmonary/Chest: CTAB. No wheezing or rales   Abdominal: +BS. Nontender to palpation. No organomegaly present.  Neurological: Alert. Normal gait and speech.   Extremities: No clubbing, cyanosis or inflammation   Skin: normal texture, color  Feet: normal pulse, normal monofilament sensation.   Lymphatic: no cervical lymphadenopathy.  Psychological: appropriate mood     In House Labs:   Lab Results   Component Value Date    A1C 10.6 06/26/2014    A1C 6.0 02/06/2013    A1C 5.6 03/20/2012       TSH   Date Value Ref Range Status   10/11/2016 0.97 0.40 - 4.00 mU/L Final   08/27/2015 1.20 0.40 - 4.00 mU/L Final   09/11/2014 1.51 0.40 - 4.00 mU/L Final     Comment:     Effective 7/30/2014, the reference range for this assay has changed to reflect   new instrumentation/methodology.       T4 Free   Date Value Ref Range Status   10/11/2016 1.07 0.76 - 1.46 ng/dL Final       Creatinine   Date Value Ref Range Status "   10/11/2016 0.83 0.66 - 1.25 mg/dL Final   ]    Recent Labs   Lab Test  10/11/16   1026  08/27/15   0914  09/11/14   1108   CHOL  127  132  119   HDL  45  44  42   LDL  71  79  66   TRIG  56  47  56   CHOLHDLRATIO   --   3.0  2.8       No results found for: [id number], [id number], [id number]    Results for PITER JEFFRIES (MRN [id number]) as of 12/11/2017 15:43   Ref. Range 12/11/2017 12:08 12/11/2017 12:16   Potassium Latest Ref Range: 3.4 - 5.3 mmol/L 4.2    Creatinine Latest Ref Range: 0.66 - 1.25 mg/dL 0.93    GFR Estimate Latest Ref Range: >60 mL/min/1.7m2 88    GFR Estimate If Black Latest Ref Range: >60 mL/min/1.7m2 >90    ALT Latest Ref Range: 0 - 70 U/L 17    AST Latest Ref Range: 0 - 45 U/L 13    Hemoglobin A1C Latest Ref Range: 4.3 - 6.0 % 6.3 (H)    Albumin Urine mg/g Cr Latest Ref Range: 0 - 17 mg/g Cr  Unable to calcula...   Albumin Urine mg/L Latest Units: mg/L  <5   Cholesterol Latest Ref Range: <200 mg/dL 103    Creatinine Urine Latest Units: mg/dL  97   HDL Cholesterol Latest Ref Range: >39 mg/dL 39 (L)    LDL Cholesterol Calculated Latest Ref Range: <100 mg/dL 55    Non HDL Cholesterol Latest Ref Range: <130 mg/dL 64    T4 Free Latest Ref Range: 0.76 - 1.46 ng/dL 1.12    Triglycerides Latest Ref Range: <150 mg/dL 44    TSH Latest Ref Range: 0.40 - 4.00 mU/L 1.05      Assessment/Treatment Plan:      Piter Jeffries is a 42 year old year old male with    1. SAMANTA  Initially insulin resistant, was started with Metformin. Further he lost weight and has been doing well since.   He is at risk of insulin deficiency and become insulin dependent in the future, though, therefore close self-monitoring and follow up will be important  -- continue metformin 1g BID   -- frequent self monitoring at least 1-2 times a day, and contact if bg is consistently out of range.      2. Preventive care:  -- obtain TSH, urine microalbumin, bmp and lipid panel.   -- normal foot exam  -- normal blood pressure  -- eye exam 1 year ago.      3. Low BMI  -- unclear if this is due to diet restriction for DM  -- if A1c trends higher, may need to start insulin to improve intake.  -- Check TTG  -- complains of occasional distension with eating pizza, but not with bread     RTC in 1 yr, 6 month with Stephie.     Elizabeth Scales MD  [id number]  Endocrinology Service    Orders Placed This Encounter   Procedures     Tissue transglutaminase valeri IgA and IgG     Patient Instructions   Continue Metformin at present dose.     FU with Stephie in 6 months, 1 year with me.

## 2017-12-11 NOTE — MR AVS SNAPSHOT
After Visit Summary   12/11/2017    Piter Conway    MRN: [id number]           Patient Information     Date Of Birth          1975        Visit Information        Provider Department      12/11/2017 12:30 PM Elizabeth Scales MD [facility] Endocrinology        Today's Diagnoses     Flatulence, eructation, and gas pain    -  1      Care Instructions    Continue Metformin at present dose.     FU with Stephie in 6 months, 1 year with me.           Follow-ups after your visit        Follow-up notes from your care team     Return in about 1 year (around 12/11/2018).      Your next 10 appointments already scheduled     Jun 13, 2018  8:30 AM CDT   (Arrive by 8:15 AM)   RETURN DIABETES with MELISSA Bai [facility] Endocrinology ([facility])    [address] [place] [id number] [phone]              Who to contact     Please call your clinic at [phone] to:    Ask questions about your health    Make or cancel appointments    Discuss your medicines    Learn about your test results    Speak to your doctor   If you have compliments or concerns about an experience at your clinic, or if you wish to file a complaint, please contact [facility] Phys[facility] Patient Relations at [phone] or email us at [email or web address]         Additional Information About Your Visit        MyChart Information     [employer] gives you secure access to your electronic health record. If you see a primary care provider, you can also send messages to your care team and make appointments. If you have questions, please call your primary care clinic.  If you do not have a primary care provider, please call [phone] and they will assist you.      [employer] is an electronic gateway that provides easy, online access to your medical records. With [employer], you can request a clinic appointment, read your test results, renew a prescription  "or communicate with your care team.     To access your existing account, please contact your [facility] or call [phone] for assistance.        Care EveryWhere ID     This is your Care EveryWhere ID. This could be used by other organizations to access your [facility] medical records  [id number]        Your Vitals Were     Height BMI (Body Mass Index)                1.854 m (6' 1\") 19.26 kg/m2           Blood Pressure from Last 3 Encounters:   12/11/17 (P) 112/80   11/01/17 115/79   08/08/17 116/78    Weight from Last 3 Encounters:   12/11/17 66.2 kg (146 lb)   11/01/17 65.8 kg (145 lb 1.9 oz)   08/08/17 64.6 kg (142 lb 6.4 oz)               Primary Care Provider Office Phone # Fax #    Rosamaria Ronda Banerjee [phone]       [address] [place] [address]        Equal Access to Services     SHREE [facility] Wesley[facility]ARC[facility] : Hadii aad ku hadasho Soomaali, waaxda luqadaha, qaybta kaalmada adeegyada, waxay keyshawnin hayscot hassan . So [facility] [phone].    ATENCIÓN: Si habla español, tiene a doll disposición servicios gratuitos de asistencia lingüística. [facility] [phone].    We comply with applicable federal civil rights laws and [place] laws. We do not discriminate on the basis of race, color, national origin, age, disability, sex, sexual orientation, or gender identity.            Thank you!     Thank you for choosing [facility] ENDOCRINOLOGY  for your care. Our goal is always to provide you with excellent care. Hearing back from our patients is one way we can continue to improve our services. Please take a few minutes to complete the written survey that you may receive in the mail after your visit with us. Thank you!             Your Updated Medication List - Protect others around you: Learn how to safely use, store and throw away your medicines at www.disposemymeds.org.          This list is accurate as of: 12/11/17  4:42 PM.  Always use your most recent med " list.                   Brand Name Dispense Instructions for use Diagnosis    BENADRYL ALLERGY PO      Take 1 tablet by mouth daily        blood glucose monitoring lancets     250 each    Use 2 times daily.    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), managed as type 1 (H)       fluticasone 50 MCG/ACT spray    FLONASE    16 g    Spray 2 sprays into both nostrils daily    Seasonal allergic rhinitis       metFORMIN 500 MG 24 hr tablet    GLUCOPHAGE-XR    360 tablet    Take 2 tablets (1,000 mg) by mouth 2 times daily (with meals)    SAMANTA (latent autoimmune diabetes mellitus in adults) (H)       omeprazole 20 MG CR capsule    priLOSEC    90 capsule    Take 20 mg daily    Gastroesophageal reflux disease without esophagitis       polyethylene glycol powder    MIRALAX    510 g    Take 17 g (1 capful) by mouth 2 times daily as needed for constipation    Constipation, unspecified constipation type

## 2017-12-11 NOTE — PROGRESS NOTES
Endocrinology Clinic Visit    2017     Referred by: Stephie Brewer. Initial visit.     Subjective:         HPI: Piter Conway is a pleasant 42 year old male who is here for a clinic visit.   He  has a past medical history of Diabetes (H) () and History of hepatitis A ().    Prediabetes in  with A1C 6.0%, did start exercising and have healthy food.   In , had fatigue, polyuria, polydipsia, lost 10 lbs. BG close to 400 A1c10.6%, positive EJ and C-peptide 1.0. There was 1+ urine ketone too. He was started on metformin   Lab showed positive EJ antibody.     He has family hx of DM in his father and brother. His older brother  of DM complications likely heart attack.    History of Diabetes  SAMANTA diagnosed in .     Complications  No chronic complications.       Treatment  Diabetes Medication(s)     Biguanides Sig    metFORMIN (GLUCOPHAGE-XR) 500 MG 24 hr tablet Take 2 tablets (1,000 mg) by mouth 2 times daily (with meals)            Prevention  Lipid  LDL Cholesterol Calculated   Date Value Ref Range Status   10/11/2016 71 <100 mg/dL Final     Comment:     Desirable:       <100 mg/dl   2015 79 0 - 129 mg/dL Final     Comment:     LDL Cholesterol is the primary guide to therapy: LDL-cholesterol goal in high   risk patients is <100 mg/dL and in very high risk patients is <70 mg/dL.         Renal  Not on ARB, no microalbuminuria      Weight  Wt Readings from Last 4 Encounters:   17 65.8 kg (145 lb 1.9 oz)   17 64.6 kg (142 lb 6.4 oz)   17 65.6 kg (144 lb 9.6 oz)   17 66.9 kg (147 lb 6.4 oz)     Meter Download Summary:   Did not bring meter    Exercise Yes, daily push ups, sit ups.     Weight trend  Wt Readings from Last 4 Encounters:   17 65.8 kg (145 lb 1.9 oz)   17 64.6 kg (142 lb 6.4 oz)   17 65.6 kg (144 lb 9.6 oz)   17 66.9 kg (147 lb 6.4 oz)       A1c today is 6.3  Lab Results   Component Value Date    A1C 10.6 2014    A1C 6.0  02/06/2013    A1C 5.6 03/20/2012       Barriers to achieve glycemic goals:   None identified.      No Known Allergies    Current Outpatient Prescriptions   Medication Sig Dispense Refill     omeprazole (PRILOSEC) 20 MG CR capsule Take 20 mg daily 90 capsule 1     blood glucose monitoring (ONE TOUCH DELICA) lancets Use 2 times daily. 250 each 3     metFORMIN (GLUCOPHAGE-XR) 500 MG 24 hr tablet Take 2 tablets (1,000 mg) by mouth 2 times daily (with meals) 360 tablet 3     blood glucose monitoring (ONE TOUCH VERIO IQ) test strip Test blood sugar twice daily. 180 each 3     polyethylene glycol (MIRALAX) powder Take 17 g (1 capful) by mouth 2 times daily as needed for constipation 510 g 1     blood glucose monitoring (ONETOUCH VERIO) meter device kit Use to test blood sugar 1 kit 0     fluticasone (FLONASE) 50 MCG/ACT nasal spray Spray 2 sprays into both nostrils daily 16 g 3     DiphenhydrAMINE HCl (BENADRYL ALLERGY PO) Take 1 tablet by mouth daily         Review of Systems   Constitutional: Negative for fever and unexpected weight change. Appetite is normal.   Head: no headache   Eye: no vision change/ loss of peripheral vision.   ENT: No throat congestion.   Respiratory: no cough   Cardiovascular: no chest pain or tachycardia  Gastrointestinal: Negative for vomiting, abdominal pain and diarrhea.  Genitourinary: No bladder problems.  Musculoskeletal: Negative for myalgias. No weakness.  Neurological: Negative for seizures and headaches.  Psychiatric/Behavioral: Negative for behavioral problem and dysphoric mood.    Past Medical History:   Diagnosis Date     Diabetes (H) 2014     History of hepatitis A 2001       Past Surgical History:   Procedure Laterality Date     NO HISTORY OF SURGERY         Family History   Problem Relation Age of Onset     Hypertension Father      DIABETES Father      Glaucoma No family hx of      Macular Degeneration No family hx of        Social History     Social History     Marital status:  "     Spouse name: N/A     Number of children: N/A     Years of education: N/A     Social History Main Topics     Smoking status: Never Smoker     Smokeless tobacco: Not on file     Alcohol use No     Drug use: No     Sexual activity: Yes     Partners: Female      Comment:      Other Topics Concern     Not on file     Social History Narrative       Objective:   BP (P) 112/80  Pulse (P) 73  Ht 1.854 m (6' 1\")  Wt 66.2 kg (146 lb)  BMI 19.26 kg/m2   Constitutional: Thin male, no distress.    EYES: anicteric, normal extra-ocular movements, no lid lag or retraction   HEENT: Mouth/Throat: Mucous membrane is moist. Oropharynx is clear. No adenopathy. Normal thyroid, well palpable.   Cardiovascular: RRR, S1, S2 normal. No m/g/r   Pulmonary/Chest: CTAB. No wheezing or rales   Abdominal: +BS. Nontender to palpation. No organomegaly present.  Neurological: Alert. Normal gait and speech.   Extremities: No clubbing, cyanosis or inflammation   Skin: normal texture, color  Feet: normal pulse, normal monofilament sensation.   Lymphatic: no cervical lymphadenopathy.  Psychological: appropriate mood     In House Labs:   Lab Results   Component Value Date    A1C 10.6 06/26/2014    A1C 6.0 02/06/2013    A1C 5.6 03/20/2012       TSH   Date Value Ref Range Status   10/11/2016 0.97 0.40 - 4.00 mU/L Final   08/27/2015 1.20 0.40 - 4.00 mU/L Final   09/11/2014 1.51 0.40 - 4.00 mU/L Final     Comment:     Effective 7/30/2014, the reference range for this assay has changed to reflect   new instrumentation/methodology.       T4 Free   Date Value Ref Range Status   10/11/2016 1.07 0.76 - 1.46 ng/dL Final       Creatinine   Date Value Ref Range Status   10/11/2016 0.83 0.66 - 1.25 mg/dL Final   ]    Recent Labs   Lab Test  10/11/16   1026  08/27/15   0914  09/11/14   1108   CHOL  127  132  119   HDL  45  44  42   LDL  71  79  66   TRIG  56  47  56   CHOLHDLRATIO   --   3.0  2.8       No results found for: [id number], " [id number], [id number]    Results for PITER JEFFRIES (MRN [id number]) as of 12/11/2017 15:43   Ref. Range 12/11/2017 12:08 12/11/2017 12:16   Potassium Latest Ref Range: 3.4 - 5.3 mmol/L 4.2    Creatinine Latest Ref Range: 0.66 - 1.25 mg/dL 0.93    GFR Estimate Latest Ref Range: >60 mL/min/1.7m2 88    GFR Estimate If Black Latest Ref Range: >60 mL/min/1.7m2 >90    ALT Latest Ref Range: 0 - 70 U/L 17    AST Latest Ref Range: 0 - 45 U/L 13    Hemoglobin A1C Latest Ref Range: 4.3 - 6.0 % 6.3 (H)    Albumin Urine mg/g Cr Latest Ref Range: 0 - 17 mg/g Cr  Unable to calcula...   Albumin Urine mg/L Latest Units: mg/L  <5   Cholesterol Latest Ref Range: <200 mg/dL 103    Creatinine Urine Latest Units: mg/dL  97   HDL Cholesterol Latest Ref Range: >39 mg/dL 39 (L)    LDL Cholesterol Calculated Latest Ref Range: <100 mg/dL 55    Non HDL Cholesterol Latest Ref Range: <130 mg/dL 64    T4 Free Latest Ref Range: 0.76 - 1.46 ng/dL 1.12    Triglycerides Latest Ref Range: <150 mg/dL 44    TSH Latest Ref Range: 0.40 - 4.00 mU/L 1.05      Assessment/Treatment Plan:      Piter Jeffries is a 42 year old year old male with    1. SAMANTA  Initially insulin resistant, was started with Metformin. Further he lost weight and has been doing well since.   He is at risk of insulin deficiency and become insulin dependent in the future, though, therefore close self-monitoring and follow up will be important  -- continue metformin 1g BID   -- frequent self monitoring at least 1-2 times a day, and contact if bg is consistently out of range.      2. Preventive care:  -- obtain TSH, urine microalbumin, bmp and lipid panel.   -- normal foot exam  -- normal blood pressure  -- eye exam 1 year ago.     3. Low BMI  -- unclear if this is due to diet restriction for DM  -- if A1c trends higher, may need to start insulin to improve intake.  -- Check TTG  -- complains of occasional distension with eating pizza, but not with bread     RTC in 1 yr, 6 month with Stephie.      Elizabeth Scales MD  0600  Endocrinology Service    Orders Placed This Encounter   Procedures     Tissue transglutaminase valeri IgA and IgG     Patient Instructions   Continue Metformin at present dose.     FU with Stephie in 6 months, 1 year with me.

## 2017-12-12 LAB
TTG IGA SER-ACNC: 1 U/ML
TTG IGG SER-ACNC: <1 U/ML

## 2018-02-07 DIAGNOSIS — E13.9 LADA (LATENT AUTOIMMUNE DIABETES MELLITUS IN ADULTS) (H): ICD-10-CM

## 2018-02-07 RX ORDER — METFORMIN HCL 500 MG
1000 TABLET, EXTENDED RELEASE 24 HR ORAL 2 TIMES DAILY WITH MEALS
Qty: 360 TABLET | Refills: 3 | Status: SHIPPED | OUTPATIENT
Start: 2018-02-07 | End: 2018-12-13

## 2018-02-07 NOTE — TELEPHONE ENCOUNTER
metformin    Last Written Prescription Date:  1/31/17  Last Fill Quantity: 360,   # refills: 3  Last Office Visit : 12/11/17  Future Office visit:  6/13/18  Creatinine   Date Value Ref Range Status   12/11/2017 0.93 0.66 - 1.25 mg/dL Final   ]

## 2018-02-12 DIAGNOSIS — E13.9 LADA (LATENT AUTOIMMUNE DIABETES IN ADULTS), MANAGED AS TYPE 1 (H): ICD-10-CM

## 2018-03-16 ENCOUNTER — TELEPHONE (OUTPATIENT)
Dept: ENDOCRINOLOGY | Facility: CLINIC | Age: 43
End: 2018-03-16

## 2018-05-04 DIAGNOSIS — J30.2 SEASONAL ALLERGIC RHINITIS: ICD-10-CM

## 2018-05-04 RX ORDER — FLUTICASONE PROPIONATE 50 MCG
2 SPRAY, SUSPENSION (ML) NASAL DAILY
Qty: 16 G | Refills: 3 | Status: SHIPPED | OUTPATIENT
Start: 2018-05-04 | End: 2021-04-16

## 2018-05-04 NOTE — TELEPHONE ENCOUNTER
Last office visit: 11/01/2017, no future appointment.    Pres[place] per [facility] [place].

## 2018-06-11 DIAGNOSIS — K21.9 GASTROESOPHAGEAL REFLUX DISEASE WITHOUT ESOPHAGITIS: ICD-10-CM

## 2018-06-11 NOTE — TELEPHONE ENCOUNTER
Last office visit: 11/01/2017, no future appointment,    Prescription approved per [facility] Refill Protocol.      Ngoc Zamarripa RN  June 11, 2018 3:40 PM

## 2018-06-13 ENCOUNTER — OFFICE VISIT (OUTPATIENT)
Dept: ENDOCRINOLOGY | Facility: CLINIC | Age: 43
End: 2018-06-13
Payer: COMMERCIAL

## 2018-06-13 VITALS
HEART RATE: 84 BPM | SYSTOLIC BLOOD PRESSURE: 112 MMHG | HEIGHT: 73 IN | WEIGHT: 142.5 LBS | BODY MASS INDEX: 18.89 KG/M2 | DIASTOLIC BLOOD PRESSURE: 78 MMHG

## 2018-06-13 DIAGNOSIS — E10.65 TYPE 1 DIABETES MELLITUS WITH HYPERGLYCEMIA (H): Primary | ICD-10-CM

## 2018-06-13 LAB — HBA1C MFR BLD: 6.2 % (ref 4.3–6)

## 2018-06-13 ASSESSMENT — PAIN SCALES - GENERAL: PAINLEVEL: NO PAIN (0)

## 2018-06-13 NOTE — MR AVS SNAPSHOT
"              After Visit Summary   6/13/2018    Piter Said    MRN: [id number]           Patient Information     Date Of Birth          1975        Visit Information        Provider Department      6/13/2018 8:30 AM Sheila Brewer PA-C [facility]        Today's Diagnoses     Type 1 diabetes mellitus with hyperglycemia (H)    -  1       Follow-ups after your visit        Your next 10 appointments already scheduled     Dec 13, 2018  8:30 AM CST   (Arrive by 8:15 AM)   RETURN DIABETES with MELISSA Bai [facility] ([facility])    [address] [place] [id number] [phone]              Who to contact     Please call your clinic at [phone] to:    Ask questions about your health    Make or cancel appointments    Discuss your medicines    Learn about your test results    Speak to your doctor            Additional Information About Your Visit        MyChart Information     [employer] gives you secure access to your electronic health record. If you see a primary care provider, you can also send messages to your care team and make appointments. If you have questions, please call your primary care clinic.  If you do not have a primary care provider, please call [phone] and they will assist you.      [employer] is an electronic gateway that provides easy, online access to your medical records. With [employer], you can request a clinic appointment, read your test results, renew a prescription or communicate with your care team.     To access your existing account, please contact your [facility] or call [phone] for assistance.        Care EveryWhere ID     This is your Care EveryWhere ID. This could be used by other organizations to access your [facility] medical records  [id number]        Your Vitals Were     Pulse Height BMI (Body Mass Index)             84 1.854 m (6' 0.99\") 18.8 kg/m2  "         Blood Pressure from Last 3 Encounters:   06/13/18 112/78   12/11/17 (P) 112/80   11/01/17 115/79    Weight from Last 3 Encounters:   06/13/18 64.6 kg (142 lb 8 oz)   12/11/17 66.2 kg (146 lb)   11/01/17 65.8 kg (145 lb 1.9 oz)              We Performed the Following     Hemoglobin A1c POCT        Primary Care Provider Office Phone # Fax #    Rosamaria Banerjee [phone]       [address] [place] [id number]        Equal Access to Services     [facility]: Hadii radha madrid hadkenneth Somecca, waaxchuck cárdenas, michell lee, indira hassan . So [facility] [phone].    ATENCIÓN: Si habla español, tiene a doll disposición servicios gratuitos de asistencia lingüística. [facility] [phone].    We comply with applicable federal civil rights laws and [place] laws. We do not discriminate on the basis of race, color, national origin, age, disability, sex, sexual orientation, or gender identity.            Thank you!     Thank you for choosing [facility] ENDOCRINOLOGY  for your care. Our goal is always to provide you with excellent care. Hearing back from our patients is one way we can continue to improve our services. Please take a few minutes to complete the written survey that you may receive in the mail after your visit with us. Thank you!             Your Updated Medication List - Protect others around you: Learn how to safely use, store and throw away your medicines at www.disposemymeds.org.          This list is accurate as of 6/13/18  1:42 PM.  Always use your most recent med list.                   Brand Name Dispense Instructions for use Diagnosis    BENADRYL ALLERGY PO      Take 1 tablet by mouth daily        blood glucose monitoring lancets     250 each    Use 2 times daily.    Type 1 diabetes mellitus with hyperglycemia (H)       blood glucose monitoring meter device kit     1 kit    Use to test blood sugar    SAMANTA (latent autoimmune diabetes  in adults), managed as type 2 (H)       blood glucose monitoring test strip    ONETOUCH VERIO IQ    180 each    Test blood sugar twice daily.    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), managed as type 1 (H)       fluticasone 50 MCG/ACT spray    FLONASE    16 g    Spray 2 sprays into both nostrils daily    Seasonal allergic rhinitis       metFORMIN 500 MG 24 hr tablet    GLUCOPHAGE-XR    360 tablet    Take 2 tablets (1,000 mg) by mouth 2 times daily (with meals)    SAMANTA (latent autoimmune diabetes mellitus in adults) (H)       omeprazole 20 MG CR capsule    priLOSEC    90 capsule    Take 20 mg po daily    Gastroesophageal reflux disease without esophagitis       polyethylene glycol powder    MIRALAX    510 g    Take 17 g (1 capful) by mouth 2 times daily as needed for constipation    Constipation, unspecified constipation type

## 2018-06-13 NOTE — LETTER
6/13/2018       RE: Piter Conway  [address]  [facility] [address]     Dear Colleague,    Thank you for referring your patient, Piter Conway, to the [facility] ENDOCRINOLOGY at [facility]. Please see a copy of my visit note below.    HPI  Piter Conway is a 43 year old male with SAMANTA here today for a follow up visit.  He was found to have an A1C of 10.6 % approx 1 year ago.   His EJ was + and C-peptide 1.0.   Pt is currently taking Metformin 1000 mg po BID.  Pt made significant changes in his lifestyle and is eating healthy and exercising.  His A1C is 6.2 % today.  We downloaded his glucose meter today and his blood sugar values are good with an average glucose of 130 with SD 25.  No hypoglycemia.  On ROS today, he no longer has abdominal pain and reports feeling well.  His weight is down today.  He has been fasting for Ramadan.  He continues to eat healthy and exercise.  Pt denies blurred vision, n/v, SOB, cough, chest pain, dysuria or hematuria.  No foot ulcers.  He denies numbness, tingling or pain in his feet or hands.    Diabetes Care  Retinopathy:yes; he was seen by Oph here in 4/2017. No changes.   Nephropathy:none; pt's urine microalbuminuria negative in Dec 2017.  Neuropathy:none.  Foot Exam:no ulcers; normal monofilamentous exam.  Taking aspirin:no  Lipids: LDL 55 in 12/2017.    ROS  Please see under HPI.    Allergies  No Known Allergies    Medications  Current Outpatient Prescriptions   Medication Sig Dispense Refill     blood glucose monitoring (ONE TOUCH DELICA) lancets Use 2 times daily. 250 each 3     blood glucose monitoring (ONETOUCH VERIO IQ) test strip Test blood sugar twice daily. 180 each 3     blood glucose monitoring (ONETOUCH VERIO) meter device kit Use to test blood sugar 1 kit 0     DiphenhydrAMINE HCl (BENADRYL ALLERGY PO) Take 1 tablet by mouth daily       fluticasone (FLONASE) 50 MCG/ACT spray Spray 2 sprays into both nostrils daily 16 g 3      "metFORMIN (GLUCOPHAGE-XR) 500 MG 24 hr tablet Take 2 tablets (1,000 mg) by mouth 2 times daily (with meals) 360 tablet 3     omeprazole (PRILOSEC) 20 MG CR capsule Take 20 mg po daily 90 capsule 1     polyethylene glycol (MIRALAX) powder Take 17 g (1 capful) by mouth 2 times daily as needed for constipation 510 g 1       Family History  family history includes DIABETES in his father; Hypertension in his father. There is no history of Glaucoma or Macular Degeneration.    Social History  Smoke: none.  ETOH: none.    Past Medical History  1. SAMANTA - dx 2014.    Physical Exam  /78  Pulse 84  Ht 1.854 m (6' 0.99\")  Wt 64.6 kg (142 lb 8 oz)  BMI 18.8 kg/m2  Body mass index is 18.8 kg/(m^2).    GENERAL : In no apparent distress.  SKIN: Normal.  EYES: PERRLA.    MOUTH: Moist.  NECK: No goiter.  RESP: Lungs clear to auscultation.  CARDIAC: RRR.   ABDOMEN: Nontender.   NEURO: awake, alert, responds appropriately to questions.    Moves all extremities; Gait normal.  No tremor.    EXTREMITIES: No edema.  FEET:  No ulcers; normal monofilamentous exam.    RESULTS  Creatinine   Date Value Ref Range Status   12/11/2017 0.93 0.66 - 1.25 mg/dL Final     GFR Estimate   Date Value Ref Range Status   12/11/2017 88 >60 mL/min/1.7m2 Final     Comment:     Non  GFR Calc     Hemoglobin A1C   Date Value Ref Range Status   12/11/2017 6.3 (H) 4.3 - 6.0 % Final     Potassium   Date Value Ref Range Status   12/11/2017 4.2 3.4 - 5.3 mmol/L Final     ALT   Date Value Ref Range Status   12/11/2017 17 0 - 70 U/L Final     AST   Date Value Ref Range Status   12/11/2017 13 0 - 45 U/L Final     TSH   Date Value Ref Range Status   12/11/2017 1.05 0.40 - 4.00 mU/L Final     T4 Free   Date Value Ref Range Status   12/11/2017 1.12 0.76 - 1.46 ng/dL Final       Cholesterol   Date Value Ref Range Status   12/11/2017 103 <200 mg/dL Final   10/11/2016 127 <200 mg/dL Final     HDL Cholesterol   Date Value Ref Range Status   12/11/2017 " 39 (L) >39 mg/dL Final   10/11/2016 45 >39 mg/dL Final     LDL Cholesterol Calculated   Date Value Ref Range Status   12/11/2017 55 <100 mg/dL Final     Comment:     Desirable:       <100 mg/dl   10/11/2016 71 <100 mg/dL Final     Comment:     Desirable:       <100 mg/dl     Triglycerides   Date Value Ref Range Status   12/11/2017 44 <150 mg/dL Final     Comment:     Fasting specimen   10/11/2016 56 <150 mg/dL Final     Cholesterol/HDL Ratio   Date Value Ref Range Status   08/27/2015 3.0 0.0 - 5.0 Final   09/11/2014 2.8 0.0 - 5.0 Final     A1C      6.2     6/13/2018  A1C      6.3     12/11/2017  A1C      5.7     8/8/2017  A1C      5.8    2/7/2017  A1C      6.0    10/11/2016  A1C      5.7    6/7/2016  A1C      6.3    3/4/2016  A1C      6.0    10/2/2015  A1C      6.6    4/2015  A1C     10.6   6/26/2014  A1C      6.0   2/6/2013  A1C      5.6   3/20/2012    ASSESSMENT/PLAN:    1.  SAMANTA:  Good glycemic control at this time with lifestyle changes and Metformin.  No need for insulin at this time.  I asked him to continue to check his blood sugar BID.  He is to remain on Metformin.  Pt is to notify us if he has FBS values > 130 or 2 hr postprandial blood sugar values are > 180.  He is also to notify us if he develops polydipsia, polyuria, or blurred vision.  Encouraged pt to continues to eat healthy and exercise.  Pt is normotensive.  Creat is 0.93 with GFR > 90 mL/min in Dec 2017.  His urine microalbuminuria was negative in 12/2017.  Feet are in good condition with normal monofilamentous exam.  TSH also normal in 12/2017.  Pt's LDL  55 in Dec 2017.    2. MILD NPDR: Pt seen by Oph here in 3/2016 with mild NPDR.  Most recent Oph exam stable in 4/2017.  I asked him to schedule an annual eye exam.    3.  Return to Endocrine Clinic to see Dr. Paul or me in 6 months.      Again, thank you for allowing me to participate in the care of your patient.      Sincerely,    Sheila Brewer PA-C

## 2018-06-13 NOTE — PROGRESS NOTES
HPI  Piter Conway is a 43 year old male with SAMANTA here today for a follow up visit.  He was found to have an A1C of 10.6 % approx 1 year ago.   His EJ was + and C-peptide 1.0.   Pt is currently taking Metformin 1000 mg po BID.  Pt made significant changes in his lifestyle and is eating healthy and exercising.  His A1C is 6.2 % today.  We downloaded his glucose meter today and his blood sugar values are good with an average glucose of 130 with SD 25.  No hypoglycemia.  On ROS today, he no longer has abdominal pain and reports feeling well.  His weight is down today.  He has been fasting for Ramadan.  He continues to eat healthy and exercise.  Pt denies blurred vision, n/v, SOB, cough, chest pain, dysuria or hematuria.  No foot ulcers.  He denies numbness, tingling or pain in his feet or hands.    Diabetes Care  Retinopathy:yes; he was seen by Oph here in 4/2017. No changes.   Nephropathy:none; pt's urine microalbuminuria negative in Dec 2017.  Neuropathy:none.  Foot Exam:no ulcers; normal monofilamentous exam.  Taking aspirin:no  Lipids: LDL 55 in 12/2017.    ROS  Please see under HPI.    Allergies  No Known Allergies    Medications  Current Outpatient Prescriptions   Medication Sig Dispense Refill     blood glucose monitoring (ONE TOUCH DELICA) lancets Use 2 times daily. 250 each 3     blood glucose monitoring (ONETOUCH VERIO IQ) test strip Test blood sugar twice daily. 180 each 3     blood glucose monitoring (ONETOUCH VERIO) meter device kit Use to test blood sugar 1 kit 0     DiphenhydrAMINE HCl (BENADRYL ALLERGY PO) Take 1 tablet by mouth daily       fluticasone (FLONASE) 50 MCG/ACT spray Spray 2 sprays into both nostrils daily 16 g 3     metFORMIN (GLUCOPHAGE-XR) 500 MG 24 hr tablet Take 2 tablets (1,000 mg) by mouth 2 times daily (with meals) 360 tablet 3     omeprazole (PRILOSEC) 20 MG CR capsule Take 20 mg po daily 90 capsule 1     polyethylene glycol (MIRALAX) powder Take 17 g (1 capful) by mouth 2 times daily  "as needed for constipation 510 g 1       Family History  family history includes DIABETES in his father; Hypertension in his father. There is no history of Glaucoma or Macular Degeneration.    Social History  Smoke: none.  ETOH: none.    Past Medical History  1. SAMANTA - dx 2014.    Physical Exam  /78  Pulse 84  Ht 1.854 m (6' 0.99\")  Wt 64.6 kg (142 lb 8 oz)  BMI 18.8 kg/m2  Body mass index is 18.8 kg/(m^2).    GENERAL : In no apparent distress.  SKIN: Normal.  EYES: PERRLA.    MOUTH: Moist.  NECK: No goiter.  RESP: Lungs clear to auscultation.  CARDIAC: RRR.   ABDOMEN: Nontender.   NEURO: awake, alert, responds appropriately to questions.    Moves all extremities; Gait normal.  No tremor.    EXTREMITIES: No edema.  FEET:  No ulcers; normal monofilamentous exam.    RESULTS  Creatinine   Date Value Ref Range Status   12/11/2017 0.93 0.66 - 1.25 mg/dL Final     GFR Estimate   Date Value Ref Range Status   12/11/2017 88 >60 mL/min/1.7m2 Final     Comment:     Non  GFR Calc     Hemoglobin A1C   Date Value Ref Range Status   12/11/2017 6.3 (H) 4.3 - 6.0 % Final     Potassium   Date Value Ref Range Status   12/11/2017 4.2 3.4 - 5.3 mmol/L Final     ALT   Date Value Ref Range Status   12/11/2017 17 0 - 70 U/L Final     AST   Date Value Ref Range Status   12/11/2017 13 0 - 45 U/L Final     TSH   Date Value Ref Range Status   12/11/2017 1.05 0.40 - 4.00 mU/L Final     T4 Free   Date Value Ref Range Status   12/11/2017 1.12 0.76 - 1.46 ng/dL Final       Cholesterol   Date Value Ref Range Status   12/11/2017 103 <200 mg/dL Final   10/11/2016 127 <200 mg/dL Final     HDL Cholesterol   Date Value Ref Range Status   12/11/2017 39 (L) >39 mg/dL Final   10/11/2016 45 >39 mg/dL Final     LDL Cholesterol Calculated   Date Value Ref Range Status   12/11/2017 55 <100 mg/dL Final     Comment:     Desirable:       <100 mg/dl   10/11/2016 71 <100 mg/dL Final     Comment:     Desirable:       <100 mg/dl "     Triglycerides   Date Value Ref Range Status   12/11/2017 44 <150 mg/dL Final     Comment:     Fasting specimen   10/11/2016 56 <150 mg/dL Final     Cholesterol/HDL Ratio   Date Value Ref Range Status   08/27/2015 3.0 0.0 - 5.0 Final   09/11/2014 2.8 0.0 - 5.0 Final     A1C      6.2     6/13/2018  A1C      6.3     12/11/2017  A1C      5.7     8/8/2017  A1C      5.8    2/7/2017  A1C      6.0    10/11/2016  A1C      5.7    6/7/2016  A1C      6.3    3/4/2016  A1C      6.0    10/2/2015  A1C      6.6    4/2015  A1C     10.6   6/26/2014  A1C      6.0   2/6/2013  A1C      5.6   3/20/2012    ASSESSMENT/PLAN:    1.  SAMANTA:  Good glycemic control at this time with lifestyle changes and Metformin.  No need for insulin at this time.  I asked him to continue to check his blood sugar BID.  He is to remain on Metformin.  Pt is to notify us if he has FBS values > 130 or 2 hr postprandial blood sugar values are > 180.  He is also to notify us if he develops polydipsia, polyuria, or blurred vision.  Encouraged pt to continues to eat healthy and exercise.  Pt is normotensive.  Creat is 0.93 with GFR > 90 mL/min in Dec 2017.  His urine microalbuminuria was negative in 12/2017.  Feet are in good condition with normal monofilamentous exam.  TSH also normal in 12/2017.  Pt's LDL  55 in Dec 2017.    2. MILD NPDR: Pt seen by Oph here in 3/2016 with mild NPDR.  Most recent Oph exam stable in 4/2017.  I asked him to schedule an annual eye exam.    3.  Return to Endocrine Clinic to see Dr. Paul or me in 6 months.

## 2018-12-10 ENCOUNTER — PATIENT OUTREACH (OUTPATIENT)
Dept: CARE COORDINATION | Facility: CLINIC | Age: 43
End: 2018-12-10

## 2018-12-13 ENCOUNTER — OFFICE VISIT (OUTPATIENT)
Dept: ENDOCRINOLOGY | Facility: CLINIC | Age: 43
End: 2018-12-13
Payer: COMMERCIAL

## 2018-12-13 VITALS
BODY MASS INDEX: 18.65 KG/M2 | WEIGHT: 140.7 LBS | SYSTOLIC BLOOD PRESSURE: 102 MMHG | DIASTOLIC BLOOD PRESSURE: 72 MMHG | HEIGHT: 73 IN | HEART RATE: 76 BPM

## 2018-12-13 DIAGNOSIS — E13.9 LADA (LATENT AUTOIMMUNE DIABETES MELLITUS IN ADULTS) (H): Primary | ICD-10-CM

## 2018-12-13 LAB — HBA1C MFR BLD: 6.6 % (ref 4.3–6)

## 2018-12-13 RX ORDER — METFORMIN HCL 500 MG
1000 TABLET, EXTENDED RELEASE 24 HR ORAL 2 TIMES DAILY WITH MEALS
Qty: 360 TABLET | Refills: 3 | Status: SHIPPED | OUTPATIENT
Start: 2018-12-13 | End: 2019-12-04

## 2018-12-13 ASSESSMENT — MIFFLIN-ST. JEOR: SCORE: 1586.93

## 2018-12-13 NOTE — NURSING NOTE
Chief Complaint   Patient presents with     RECHECK     SAMANTA     Performed capillary puncture for A1C testing. Patient tolerated well.    Dave Gamino [facility]  Endocrinology & Diabetes

## 2018-12-13 NOTE — LETTER
12/13/2018       RE: Piter Conway  [address]  [facility] [address]     Dear Colleague,    Thank you for referring your patient, Piter Conway, to the [facility] ENDOCRINOLOGY at [facility]. Please see a copy of my visit note below.    HPI  Piter Conway is a 43 year old male with SAMANTA here today for a follow up visit.  He was found to have an A1C of 10.6 % approx 2 years ago.   His EJ was + and C-peptide 1.0.   Pt is currently taking Metformin 1000 mg po BID.  Pt made significant changes in his lifestyle and is eating healthy and exercising.  His A1C is 6.6 % today. His previous A1C was 6.2 %.  He forgot to bring his glucose meter to his visit today.  He states his FBS values have been < 140 and his 2 hr postmeal blood sugar values are < 180.  On ROS today, he reports feeling well.   He continues to eat healthy and exercise.  Pt denies frequent headaches, blurred vision, n/v, SOB, cough, chest pain, dysuria or hematuria.  No foot ulcers.  He denies numbness, tingling or pain in his feet or hands.    Diabetes Care  Retinopathy:yes; he was seen by Oph here in 4/2017. No changes.   He plans to see an Oph in Jan 2019.  Nephropathy:none; pt's urine microalbuminuria negative in Dec 2017.  Neuropathy:none.  Foot Exam:no ulcers; normal monofilamentous exam.  Taking aspirin:no  Lipids: LDL 55 in 12/2017.    ROS  Please see under HPI.    Allergies  No Known Allergies    Medications  Current Outpatient Medications   Medication Sig Dispense Refill     DiphenhydrAMINE HCl (BENADRYL ALLERGY PO) Take 1 tablet by mouth daily       fluticasone (FLONASE) 50 MCG/ACT spray Spray 2 sprays into both nostrils daily 16 g 3     metFORMIN (GLUCOPHAGE-XR) 500 MG 24 hr tablet Take 2 tablets (1,000 mg) by mouth 2 times daily (with meals) 360 tablet 3     omeprazole (PRILOSEC) 20 MG CR capsule Take 20 mg po daily 90 capsule 1     polyethylene glycol (MIRALAX) powder Take 17 g (1 capful) by mouth 2 times  "daily as needed for constipation 510 g 1     blood glucose monitoring (ONE TOUCH DELICA) lancets Use 2 times daily. 250 each 3     blood glucose monitoring (ONETOUCH VERIO IQ) test strip Test blood sugar twice daily. 180 each 3     blood glucose monitoring (ONETOUCH VERIO) meter device kit Use to test blood sugar 1 kit 0       Family History  family history includes Diabetes in his father; Hypertension in his father.    Social History  Smoke: none.  ETOH: none.    Past Medical History  1. SAMANTA - dx 2014.    Physical Exam  /72   Pulse 76   Ht 1.854 m (6' 0.99\")   Wt 63.8 kg (140 lb 11.2 oz)   BMI 18.57 kg/m     Body mass index is 18.57 kg/m .    GENERAL : In no apparent distress.  SKIN: Normal.  EYES: PERRLA.    MOUTH: Moist.  NECK: No goiter.  RESP: Lungs clear to auscultation.  CARDIAC: RRR.   ABDOMEN: Nontender.   NEURO: awake, alert, responds appropriately to questions.    Moves all extremities; Gait normal.  No tremor.    EXTREMITIES: No edema.  FEET:  No ulcers; normal monofilamentous exam.    RESULTS  Creatinine   Date Value Ref Range Status   12/11/2017 0.93 0.66 - 1.25 mg/dL Final     GFR Estimate   Date Value Ref Range Status   12/11/2017 88 >60 mL/min/1.7m2 Final     Comment:     Non  GFR Calc     Hemoglobin A1C   Date Value Ref Range Status   12/11/2017 6.3 (H) 4.3 - 6.0 % Final     Potassium   Date Value Ref Range Status   12/11/2017 4.2 3.4 - 5.3 mmol/L Final     ALT   Date Value Ref Range Status   12/11/2017 17 0 - 70 U/L Final     AST   Date Value Ref Range Status   12/11/2017 13 0 - 45 U/L Final     TSH   Date Value Ref Range Status   12/11/2017 1.05 0.40 - 4.00 mU/L Final     T4 Free   Date Value Ref Range Status   12/11/2017 1.12 0.76 - 1.46 ng/dL Final       Cholesterol   Date Value Ref Range Status   12/11/2017 103 <200 mg/dL Final   10/11/2016 127 <200 mg/dL Final     HDL Cholesterol   Date Value Ref Range Status   12/11/2017 39 (L) >39 mg/dL Final   10/11/2016 45 >39 " mg/dL Final     LDL Cholesterol Calculated   Date Value Ref Range Status   12/11/2017 55 <100 mg/dL Final     Comment:     Desirable:       <100 mg/dl   10/11/2016 71 <100 mg/dL Final     Comment:     Desirable:       <100 mg/dl     Triglycerides   Date Value Ref Range Status   12/11/2017 44 <150 mg/dL Final     Comment:     Fasting specimen   10/11/2016 56 <150 mg/dL Final     Cholesterol/HDL Ratio   Date Value Ref Range Status   08/27/2015 3.0 0.0 - 5.0 Final   09/11/2014 2.8 0.0 - 5.0 Final     A1C      6.6    12/13/2018  A1C      6.2     6/13/2018  A1C      6.3     12/11/2017  A1C      5.7     8/8/2017  A1C      5.8    2/7/2017  A1C      6.0    10/11/2016  A1C      5.7    6/7/2016  A1C      6.3    3/4/2016  A1C      6.0    10/2/2015  A1C      6.6    4/2015  A1C     10.6   6/26/2014  A1C      6.0   2/6/2013  A1C      5.6   3/20/2012    ASSESSMENT/PLAN:    1.  SAMANTA:  Good glycemic control at this time with lifestyle changes and Metformin.  No need for insulin at this time.  I asked him to continue to check his blood sugar BID.  He is to remain on Metformin 1000 mg BID.  Pt is to notify us if he has FBS values > 130 or 2 hr postprandial blood sugar values are > 180.  He is also to notify us if he develops polydipsia, polyuria, or blurred vision.  Encouraged pt to continues to eat healthy and exercise.  Pt is normotensive.  Creat is 0.93 with GFR > 90 mL/min in Dec 2017.  His urine microalbuminuria was negative in 12/2017.  Feet are in good condition with normal monofilamentous exam.  TSH also normal in 12/2017.  Pt's LDL  55 in Dec 2017.  Pt had the flu vaccine this season.  I placed an order for annual fasting labs to be done in Jan or Feb 2019.    2. MILD NPDR: Pt seen by Oph here in 3/2016 with mild NPDR.  Most recent Oph exam stable in 4/2017.  He will be seeing Oph in Jan 2019.    3.  Return to Endocrine Clinic to see Dr. Duckworth or me in 5 months.          Again, thank you for allowing me to participate in  the care of your patient.      Sincerely,    Sheila Brewer PA-C

## 2018-12-13 NOTE — PROGRESS NOTES
HPI  Piter Conway is a 43 year old male with SAMANTA here today for a follow up visit.  He was found to have an A1C of 10.6 % approx 2 years ago.   His EJ was + and C-peptide 1.0.   Pt is currently taking Metformin 1000 mg po BID.  Pt made significant changes in his lifestyle and is eating healthy and exercising.  His A1C is 6.6 % today. His previous A1C was 6.2 %.  He forgot to bring his glucose meter to his visit today.  He states his FBS values have been < 140 and his 2 hr postmeal blood sugar values are < 180.  On ROS today, he reports feeling well.   He continues to eat healthy and exercise.  Pt denies frequent headaches, blurred vision, n/v, SOB, cough, chest pain, dysuria or hematuria.  No foot ulcers.  He denies numbness, tingling or pain in his feet or hands.    Diabetes Care  Retinopathy:yes; he was seen by Oph here in 4/2017. No changes.   He plans to see an Oph in Jan 2019.  Nephropathy:none; pt's urine microalbuminuria negative in Dec 2017.  Neuropathy:none.  Foot Exam:no ulcers; normal monofilamentous exam.  Taking aspirin:no  Lipids: LDL 55 in 12/2017.    ROS  Please see under HPI.    Allergies  No Known Allergies    Medications  Current Outpatient Medications   Medication Sig Dispense Refill     DiphenhydrAMINE HCl (BENADRYL ALLERGY PO) Take 1 tablet by mouth daily       fluticasone (FLONASE) 50 MCG/ACT spray Spray 2 sprays into both nostrils daily 16 g 3     metFORMIN (GLUCOPHAGE-XR) 500 MG 24 hr tablet Take 2 tablets (1,000 mg) by mouth 2 times daily (with meals) 360 tablet 3     omeprazole (PRILOSEC) 20 MG CR capsule Take 20 mg po daily 90 capsule 1     polyethylene glycol (MIRALAX) powder Take 17 g (1 capful) by mouth 2 times daily as needed for constipation 510 g 1     blood glucose monitoring (ONE TOUCH DELICA) lancets Use 2 times daily. 250 each 3     blood glucose monitoring (ONETOUCH VERIO IQ) test strip Test blood sugar twice daily. 180 each 3     blood glucose monitoring (ONETOUCH VERIO) meter  "device kit Use to test blood sugar 1 kit 0       Family History  family history includes Diabetes in his father; Hypertension in his father.    Social History  Smoke: none.  ETOH: none.    Past Medical History  1. SAMANTA - dx 2014.    Physical Exam  /72   Pulse 76   Ht 1.854 m (6' 0.99\")   Wt 63.8 kg (140 lb 11.2 oz)   BMI 18.57 kg/m    Body mass index is 18.57 kg/m .    GENERAL : In no apparent distress.  SKIN: Normal.  EYES: PERRLA.    MOUTH: Moist.  NECK: No goiter.  RESP: Lungs clear to auscultation.  CARDIAC: RRR.   ABDOMEN: Nontender.   NEURO: awake, alert, responds appropriately to questions.    Moves all extremities; Gait normal.  No tremor.    EXTREMITIES: No edema.  FEET:  No ulcers; normal monofilamentous exam.    RESULTS  Creatinine   Date Value Ref Range Status   12/11/2017 0.93 0.66 - 1.25 mg/dL Final     GFR Estimate   Date Value Ref Range Status   12/11/2017 88 >60 mL/min/1.7m2 Final     Comment:     Non  GFR Calc     Hemoglobin A1C   Date Value Ref Range Status   12/11/2017 6.3 (H) 4.3 - 6.0 % Final     Potassium   Date Value Ref Range Status   12/11/2017 4.2 3.4 - 5.3 mmol/L Final     ALT   Date Value Ref Range Status   12/11/2017 17 0 - 70 U/L Final     AST   Date Value Ref Range Status   12/11/2017 13 0 - 45 U/L Final     TSH   Date Value Ref Range Status   12/11/2017 1.05 0.40 - 4.00 mU/L Final     T4 Free   Date Value Ref Range Status   12/11/2017 1.12 0.76 - 1.46 ng/dL Final       Cholesterol   Date Value Ref Range Status   12/11/2017 103 <200 mg/dL Final   10/11/2016 127 <200 mg/dL Final     HDL Cholesterol   Date Value Ref Range Status   12/11/2017 39 (L) >39 mg/dL Final   10/11/2016 45 >39 mg/dL Final     LDL Cholesterol Calculated   Date Value Ref Range Status   12/11/2017 55 <100 mg/dL Final     Comment:     Desirable:       <100 mg/dl   10/11/2016 71 <100 mg/dL Final     Comment:     Desirable:       <100 mg/dl     Triglycerides   Date Value Ref Range Status "   12/11/2017 44 <150 mg/dL Final     Comment:     Fasting specimen   10/11/2016 56 <150 mg/dL Final     Cholesterol/HDL Ratio   Date Value Ref Range Status   08/27/2015 3.0 0.0 - 5.0 Final   09/11/2014 2.8 0.0 - 5.0 Final     A1C      6.6    12/13/2018  A1C      6.2     6/13/2018  A1C      6.3     12/11/2017  A1C      5.7     8/8/2017  A1C      5.8    2/7/2017  A1C      6.0    10/11/2016  A1C      5.7    6/7/2016  A1C      6.3    3/4/2016  A1C      6.0    10/2/2015  A1C      6.6    4/2015  A1C     10.6   6/26/2014  A1C      6.0   2/6/2013  A1C      5.6   3/20/2012    ASSESSMENT/PLAN:    1.  SAMANTA:  Good glycemic control at this time with lifestyle changes and Metformin.  No need for insulin at this time.  I asked him to continue to check his blood sugar BID.  He is to remain on Metformin 1000 mg BID.  Pt is to notify us if he has FBS values > 130 or 2 hr postprandial blood sugar values are > 180.  He is also to notify us if he develops polydipsia, polyuria, or blurred vision.  Encouraged pt to continues to eat healthy and exercise.  Pt is normotensive.  Creat is 0.93 with GFR > 90 mL/min in Dec 2017.  His urine microalbuminuria was negative in 12/2017.  Feet are in good condition with normal monofilamentous exam.  TSH also normal in 12/2017.  Pt's LDL  55 in Dec 2017.  Pt had the flu vaccine this season.  I placed an order for annual fasting labs to be done in Jan or Feb 2019.    2. MILD NPDR: Pt seen by Oph here in 3/2016 with mild NPDR.  Most recent Oph exam stable in 4/2017.  He will be seeing Oph in Jan 2019.    3.  Return to [facility] to see Dr. Duckworth or me in 5 months.

## 2019-01-02 DIAGNOSIS — E13.9 LADA (LATENT AUTOIMMUNE DIABETES MELLITUS IN ADULTS) (H): ICD-10-CM

## 2019-01-02 LAB
ALT SERPL W P-5'-P-CCNC: 24 U/L (ref 0–70)
AST SERPL W P-5'-P-CCNC: 12 U/L (ref 0–45)
CHOLEST SERPL-MCNC: 160 MG/DL
CREAT SERPL-MCNC: 0.78 MG/DL (ref 0.66–1.25)
CREAT UR-MCNC: 90 MG/DL
GFR SERPL CREATININE-BSD FRML MDRD: >90 ML/MIN/{1.73_M2}
HDLC SERPL-MCNC: 50 MG/DL
LDLC SERPL CALC-MCNC: 100 MG/DL
MICROALBUMIN UR-MCNC: <5 MG/L
MICROALBUMIN/CREAT UR: NORMAL MG/G CR (ref 0–17)
NONHDLC SERPL-MCNC: 110 MG/DL
POTASSIUM SERPL-SCNC: 4.4 MMOL/L (ref 3.4–5.3)
TRIGL SERPL-MCNC: 47 MG/DL
TSH SERPL DL<=0.005 MIU/L-ACNC: 1.6 MU/L (ref 0.4–4)

## 2019-02-12 ENCOUNTER — HOSPITAL ENCOUNTER (EMERGENCY)
Facility: CLINIC | Age: 44
Discharge: HOME | End: 2019-02-13
Attending: EMERGENCY MEDICINE | Admitting: EMERGENCY MEDICINE
Payer: COMMERCIAL

## 2019-02-12 DIAGNOSIS — S16.1XXA STRAIN OF NECK MUSCLE, INITIAL ENCOUNTER: ICD-10-CM

## 2019-02-12 PROCEDURE — 99283 EMERGENCY DEPT VISIT LOW MDM: CPT

## 2019-02-12 PROCEDURE — 99284 EMERGENCY DEPT VISIT MOD MDM: CPT | Mod: Z6 | Performed by: EMERGENCY MEDICINE

## 2019-02-12 ASSESSMENT — MIFFLIN-ST. JEOR: SCORE: 1619.74

## 2019-02-12 NOTE — ED AVS SNAPSHOT
[facility], [place], Emergency Department  [address]  [place] [address]  Phone:  [phone]  Fax:  [phone]                                    Piter Conway   MRN: [id number]    Department:  [facility], Emergency Department   Date of Visit:  2/12/2019           After Visit Summary Signature Page    I have received my discharge instructions, and my questions have been answered. I have discussed any challenges I see with this plan with the nurse or doctor.    ..........................................................................................................................................  Patient/Patient Representative Signature      ..........................................................................................................................................  Patient Representative Print Name and Relationship to Patient    ..................................................               ................................................  Date                                   Time    ..........................................................................................................................................  Reviewed by Signature/Title    ...................................................              ..............................................  Date                                               Time          22EPIC Rev 08/18

## 2019-02-13 VITALS
BODY MASS INDEX: 19.75 KG/M2 | HEIGHT: 73 IN | HEART RATE: 88 BPM | RESPIRATION RATE: 16 BRPM | TEMPERATURE: 97.2 F | WEIGHT: 149 LBS | OXYGEN SATURATION: 98 % | DIASTOLIC BLOOD PRESSURE: 82 MMHG | SYSTOLIC BLOOD PRESSURE: 114 MMHG

## 2019-02-13 RX ORDER — CYCLOBENZAPRINE HCL 10 MG
10 TABLET ORAL 3 TIMES DAILY PRN
Qty: 20 TABLET | Refills: 0 | Status: SHIPPED | OUTPATIENT
Start: 2019-02-13 | End: 2019-02-19

## 2019-02-13 RX ORDER — HYDROCODONE BITARTRATE AND ACETAMINOPHEN 5; 325 MG/1; MG/1
1 TABLET ORAL
Qty: 2 TABLET | Refills: 0 | Status: SHIPPED | OUTPATIENT
Start: 2019-02-13 | End: 2019-02-16

## 2019-02-13 ASSESSMENT — ENCOUNTER SYMPTOMS
SHORTNESS OF BREATH: 0
COUGH: 0
VOMITING: 0
NUMBNESS: 0
WEAKNESS: 0
NECK PAIN: 1
ABDOMINAL PAIN: 0

## 2019-02-13 NOTE — ED PROVIDER NOTES
History     Chief Complaint   Patient presents with     Neck Pain     patient was exercising this morning then strained neck, took Ibuprofen but no relief, denies dizziness.     FLORIDALMA Conway is a 44 year old male who presents to the Emergency Department with a complaint of left sided neck pain  throughout the day today. He states that he was doing a heavy exercise routine earlier in the day, and was doing a lot of sit ups. He states that at one point during his sit ups, he looked to the right and felt a strain to the left side of his neck. He states its been bothering him throughout the day but hes been using ibuprofen. He states tonight he was having so much pain despite ibuprofen , that he was having difficulty sleeping. He denies any direct truama, numbness, tingling, weakness in the arms or the legs. No cough or shortness of breath. No abdomal pain or vomiting. No other specific complaints at this time.    This part of the medical record was transcribed by Mady Simmons, Medical Scribe, from a dictation done by Dr. Maloney.  I have reviewed the Medications, Allergies, Past Medical and Surgical History, and Social History in the [employer] system.  Past Medical History:   Diagnosis Date     Diabetes (H) 2014     History of hepatitis A 2001       Past Surgical History:   Procedure Laterality Date     NO HISTORY OF SURGERY         Family History   Problem Relation Age of Onset     Hypertension Father      Diabetes Father      Glaucoma No family hx of      Macular Degeneration No family hx of        Social History     Tobacco Use     Smoking status: Never Smoker     Smokeless tobacco: Never Used   Substance Use Topics     Alcohol use: No       No current facility-administered medications for this encounter.      Current Outpatient Medications   Medication     blood glucose monitoring (ONE TOUCH DELICA) lancets     blood glucose monitoring (ONETOUCH VERIO IQ) test strip     cyclobenzaprine (FLEXERIL) 10 MG tablet      "fluticasone (FLONASE) 50 MCG/ACT spray     HYDROcodone-acetaminophen (NORCO) 5-325 MG tablet     metFORMIN (GLUCOPHAGE-XR) 500 MG 24 hr tablet     omeprazole (PRILOSEC) 20 MG CR capsule     polyethylene glycol (MIRALAX) powder     blood glucose monitoring (ONETOUCH VERIO) meter device kit     DiphenhydrAMINE HCl (BENADRYL ALLERGY PO)      No Known Allergies    Review of Systems   Respiratory: Negative for cough and shortness of breath.    Gastrointestinal: Negative for abdominal pain and vomiting.   Musculoskeletal: Positive for neck pain.   Neurological: Negative for weakness and numbness.       Physical Exam   BP: 113/80  Pulse: 86  Temp: 96.5  F (35.8  C)  Resp: 18  Height: 185.4 cm (6' 1\")  Weight: 67.6 kg (149 lb)  SpO2: 98 %      Physical Exam   Constitutional: No distress.   HENT:   Head: Atraumatic.   Mouth/Throat: Oropharynx is clear and moist.   Eyes: Pupils are equal, round, and reactive to light. No scleral icterus.   Neck:       Cardiovascular: Normal heart sounds and intact distal pulses.   Pulmonary/Chest: Breath sounds normal. No respiratory distress.   Abdominal: Soft. Bowel sounds are normal. There is no tenderness.   Musculoskeletal: He exhibits no edema or tenderness.   Neurological: He is alert. No sensory deficit. He exhibits normal muscle tone.   Strength and sensation are normal and equal in bilateral upper and lower extremities.   Skin: Skin is warm. No rash noted. He is not diaphoretic.       ED Course        Procedures             Critical Care time:  none             Labs Ordered and Resulted from Time of ED Arrival Up to the Time of Departure from the ED - No data to display         Assessments & Plan (with Medical Decision Making)   Patient's exam is fairly benign. He does have tenderness in the left paraspinous musculature. No midline tenderness. Range of motion and cms is normal as is his strength in bilateral upper estremities and lower extremities. I don t think he has a bony " injury. And don t think xrays are likely to be helpful. I do recommend he continue ibuprofen and will add flexeril. I will also give him a total of two tablets of Vicodin. I have recommended he use one tonight and one tomorrow night if the pain persists. He s instructed to follow up with his primary-care provider and to return to the ER with new or worsening symptoms. He verbalized understanding. He is agreeable with the plan.     This part of the medical record was transcribed by Mady Simmons, Medical Scribe, from a dictation done by Dr. Maloney.  I have reviewed the nursing notes.    I have reviewed the findings, diagnosis, plan and need for follow up with the patient.       Medication List      Started    cyclobenzaprine 10 MG tablet  Commonly known as:  FLEXERIL  10 mg, Oral, 3 TIMES DAILY PRN     HYDROcodone-acetaminophen 5-325 MG tablet  Commonly known as:  NORCO  1 tablet, Oral, AT BEDTIME PRN            Final diagnoses:   Strain of neck muscle, initial encounter       2/12/2019   [facility], [place], EMERGENCY DEPARTMENT     Azalia Maloney MD  02/13/19 [id number]

## 2019-02-13 NOTE — ED TRIAGE NOTES
Patient was doing sit ups exercise today, strained neck, denies feeling dizzy. Few hours after, patient started feeling pain not relieved by Ibuprofen.

## 2019-02-13 NOTE — DISCHARGE INSTRUCTIONS
Please make an appointment to follow up with Your Primary Care Provider later in the week if symptoms are not improving. Return to the ER with new or worsening symptoms.

## 2019-02-26 DIAGNOSIS — E13.9 LADA (LATENT AUTOIMMUNE DIABETES IN ADULTS), MANAGED AS TYPE 1 (H): ICD-10-CM

## 2019-03-04 ENCOUNTER — RESULTS ONLY (OUTPATIENT)
Dept: ORTHOPEDICS | Facility: CLINIC | Age: 44
End: 2019-03-04

## 2019-03-04 ENCOUNTER — OFFICE VISIT (OUTPATIENT)
Dept: FAMILY MEDICINE | Facility: CLINIC | Age: 44
End: 2019-03-04
Payer: COMMERCIAL

## 2019-03-04 VITALS
BODY MASS INDEX: 18.55 KG/M2 | WEIGHT: 140 LBS | TEMPERATURE: 97.6 F | OXYGEN SATURATION: 97 % | HEIGHT: 73 IN | DIASTOLIC BLOOD PRESSURE: 79 MMHG | HEART RATE: 102 BPM | SYSTOLIC BLOOD PRESSURE: 116 MMHG

## 2019-03-04 DIAGNOSIS — K21.9 GASTROESOPHAGEAL REFLUX DISEASE WITHOUT ESOPHAGITIS: ICD-10-CM

## 2019-03-04 DIAGNOSIS — K59.00 CONSTIPATION, UNSPECIFIED CONSTIPATION TYPE: ICD-10-CM

## 2019-03-04 DIAGNOSIS — R11.2 NAUSEA AND VOMITING, INTRACTABILITY OF VOMITING NOT SPECIFIED, UNSPECIFIED VOMITING TYPE: Primary | ICD-10-CM

## 2019-03-04 LAB
% GRANULOCYTES: 51.8 %G (ref 40–75)
ALBUMIN SERPL-MCNC: 3.8 G/DL (ref 3.3–4.6)
ALBUMIN SERPL-MCNC: 4 G/DL (ref 3.4–5)
ALP SERPL-CCNC: 100 U/L (ref 40–150)
ALP SERPL-CCNC: 84 U/L (ref 40–150)
ALT SERPL W P-5'-P-CCNC: 20 U/L (ref 0–70)
ALT SERPL-CCNC: 23 U/L (ref 0–70)
AMYLASE SERPL-CCNC: 98 U/L (ref 30–110)
ANION GAP SERPL CALCULATED.3IONS-SCNC: 5 MMOL/L (ref 3–14)
AST SERPL W P-5'-P-CCNC: 8 U/L (ref 0–45)
AST SERPL-CCNC: 21 U/L (ref 0–55)
BILIRUB SERPL-MCNC: 0.3 MG/DL (ref 0.2–1.3)
BILIRUB SERPL-MCNC: 0.7 MG/DL (ref 0.2–1.3)
BILIRUBIN UR: NEGATIVE
BLOOD UR: ABNORMAL
BUN SERPL-MCNC: 15 MG/DL (ref 5–24)
BUN SERPL-MCNC: 17 MG/DL (ref 7–30)
CALCIUM SERPL-MCNC: 9.1 MG/DL (ref 8.5–10.1)
CALCIUM SERPL-MCNC: 9.7 MG/DL (ref 8.5–10.4)
CHLORIDE SERPL-SCNC: 103 MMOL/L (ref 94–109)
CHLORIDE SERPLBLD-SCNC: 102 MMOL/L (ref 94–109)
CO2 SERPL-SCNC: 30 MMOL/L (ref 20–32)
CO2 SERPL-SCNC: 32 MMOL/L (ref 20–32)
CREAT SERPL-MCNC: 0.79 MG/DL (ref 0.66–1.25)
CREAT SERPL-MCNC: 0.9 MG/DL (ref 0.8–1.5)
EGFR CALCULATED (BLACK REFERENCE): 117.9
EGFR CALCULATED (NON BLACK REFERENCE): 97.4
ERYTHROCYTE [DISTWIDTH] IN BLOOD BY AUTOMATED COUNT: 12.7 %
GFR SERPL CREATININE-BSD FRML MDRD: >90 ML/MIN/{1.73_M2}
GLUCOSE SERPL-MCNC: 179 MG/DL (ref 70–99)
GLUCOSE SERPL-MCNC: 182 MG/DL (ref 60–109)
GLUCOSE URINE: ABNORMAL
GRANULOCYTES #: 1.7 K/UL (ref 1.6–8.3)
HBA1C MFR BLD: 6.9 % (ref 4.1–5.7)
HCT VFR BLD AUTO: 46.5 % (ref 40–53)
HEMOGLOBIN: 15.1 G/DL (ref 13.3–17.7)
KETONES UR QL: ABNORMAL
LEUKOCYTE ESTERASE UR: NEGATIVE
LIPASE SERPL-CCNC: 121 U/L (ref 73–393)
LYMPHOCYTES # BLD AUTO: 1.2 K/UL (ref 0.8–5.3)
LYMPHOCYTES NFR BLD AUTO: 37.6 %L (ref 20–48)
MCH RBC QN AUTO: 30 PG (ref 26.5–35)
MCHC RBC AUTO-ENTMCNC: 32.5 G/DL (ref 32–36)
MCV RBC AUTO: 92.4 FL (ref 78–100)
MID #: 0.3 K/UL (ref 0–2.2)
MID %: 10.6 %M (ref 0–20)
NITRITE UR QL STRIP: NEGATIVE
PH UR STRIP: 5.5 [PH] (ref 5–7)
PLATELET # BLD AUTO: 229 K/UL (ref 150–450)
POTASSIUM SERPL-SCNC: 4.5 MMOL/L (ref 3.4–5.3)
POTASSIUM SERPL-SCNC: 4.6 MMOL/L (ref 3.4–5.3)
PROT SERPL-MCNC: 7.7 G/DL (ref 6.8–8.8)
PROT SERPL-MCNC: 7.9 G/DL (ref 6.8–8.8)
PROTEIN UR: NEGATIVE
RBC # BLD AUTO: 5.03 M/UL (ref 4.4–5.9)
SODIUM SERPL-SCNC: 138 MMOL/L (ref 133–144)
SODIUM SERPL-SCNC: 144 MMOL/L (ref 136–145)
SP GR UR STRIP: >=1.03
UROBILINOGEN UR STRIP-ACNC: ABNORMAL
WBC # BLD AUTO: 3.2 K/UL (ref 4–11)

## 2019-03-04 RX ORDER — POLYETHYLENE GLYCOL 3350 17 G/17G
1 POWDER, FOR SOLUTION ORAL DAILY
Qty: 119 G | Refills: 0 | Status: SHIPPED | OUTPATIENT
Start: 2019-03-04 | End: 2020-07-21

## 2019-03-04 RX ORDER — ONDANSETRON 4 MG/1
TABLET, ORALLY DISINTEGRATING ORAL
Qty: 16 TABLET | Refills: 0 | Status: SHIPPED | OUTPATIENT
Start: 2019-03-04 | End: 2020-07-21

## 2019-03-04 ASSESSMENT — MIFFLIN-ST. JEOR: SCORE: 1578.79

## 2019-03-04 NOTE — PATIENT INSTRUCTIONS
PLAN:  - Restart the Omeprazole  -Also try Zofran to see if benefit on nausea and vomitting.  -Use Miralax at bedtime to help with constipation  -If symptoms do not improve in 5-7 days, then consider further testing, e.g. X-rays, gastric emptying studies and/or CT scans.       1. Nausea and vomiting, intractability of vomiting not specified, unspecified vomiting type    - Comprehensive metabolic panel  - CBC with Diff Plt ([facility])  - Hemoglobin A1c ([place])  - Lipase  - Amylase  - Urinalysis ([place])  - ondansetron (ZOFRAN-ODT) 4 MG ODT tab; Place 1-2 tabs under tongue every 8-12 hours as needed for nausea and vomiting  Dispense: 16 tablet; Refill: 0    2. Gastroesophageal reflux disease without esophagitis    - omeprazole (PRILOSEC) 20 MG DR capsule; Take 20 mg po daily  Dispense: 90 capsule; Refill: 1    3. Constipation, unspecified constipation type    - polyethylene glycol (MIRALAX) powder; Take 17 g (1 capful) by mouth daily  Dispense: 119 g; Refill: 0

## 2019-03-04 NOTE — NURSING NOTE
"44 year old  Chief Complaint   Patient presents with     Nausea     with very little appetite. Also pt reports vommitting every morning for the past 7 days. Sx started last Monday     Dizziness       Blood pressure 116/79, pulse 102, temperature 97.6  F (36.4  C), temperature source Oral, height 1.854 m (6' 0.99\"), weight 63.5 kg (140 lb), SpO2 97 %. Body mass index is 18.47 kg/m .  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)       Wt Readings from Last 2 Encounters:   03/04/19 63.5 kg (140 lb)   02/12/19 67.6 kg (149 lb)     BP Readings from Last 3 Encounters:   03/04/19 116/79   02/13/19 114/82   12/13/18 102/72         Current Outpatient Medications   Medication     blood glucose (ONETOUCH VERIO IQ) test strip     blood glucose monitoring (ONE TOUCH DELICA) lancets     blood glucose monitoring (ONETOUCH VERIO) meter device kit     DiphenhydrAMINE HCl (BENADRYL ALLERGY PO)     fluticasone (FLONASE) 50 MCG/ACT spray     metFORMIN (GLUCOPHAGE-XR) 500 MG 24 hr tablet     omeprazole (PRILOSEC) 20 MG CR capsule     polyethylene glycol (MIRALAX) powder     No current facility-administered medications for this visit.        Social History     Tobacco Use     Smoking status: Never Smoker     Smokeless tobacco: Never Used   Substance Use Topics     Alcohol use: No     Drug use: No       Health Maintenance Due   Topic Date Due     FOOT EXAM Q1 YEAR  01/01/1976     PREVENTIVE CARE VISIT  02/06/2014     EYE EXAM Q1 YEAR  04/12/2018     INFLUENZA VACCINE (1) 09/01/2018     PHQ-2 Q1 YR  11/01/2018       No results found for: PAP      March 4, 2019 2:54 PM  "

## 2019-03-04 NOTE — PROGRESS NOTES
Piter Conway is a 44 year old male with DM2 who presents to [facility] with the following concern:  - Vomitting and nausea for the past 7 days.   He's vomitted in the mornings the past 7 days. He's also been very nauseous with poor appetite.   His blood sugars are about 130-140 in the mornings and 180-200 in the afternoons.     No fevers. Some dizziness. Occasional sweats. Drinking well. Normal urine. Feels lots of belching/burping  Feels constipated, perhaps from not eating.      Of note, he stopped Omeprazole about 3 months ago. He was feeling well at that time.     Review Of Systems:  See HPI.   Has otherwise been in usual state of health, e.g.   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 250 each 3     blood glucose monitoring (ONETOUCH VERIO) meter device kit Use to test blood sugar 1 kit 0     DiphenhydrAMINE HCl (BENADRYL ALLERGY PO) Take 1 tablet by mouth daily       fluticasone (FLONASE) 50 MCG/ACT spray Spray 2 sprays into both nostrils daily 16 g 3     metFORMIN (GLUCOPHAGE-XR) 500 MG 24 hr tablet Take 2 tablets (1,000 mg) by mouth 2 times daily (with meals) 360 tablet 3     omeprazole (PRILOSEC) 20 MG CR capsule Take 20 mg po daily 90 capsule 1     polyethylene glycol (MIRALAX) powder Take 17 g (1 capful) by mouth 2 times daily as needed for constipation 510 g 1       No Known Allergies     Social:   Works as a     EXAM    Vitals: /79   Pulse 102   Temp 97.6  F (36.4  C)  "(Oral)   Ht 1.854 m (6' 0.99\")   Wt 63.5 kg (140 lb)   SpO2 97%   BMI 18.47 kg/m    BMI= Body mass index is 18.47 kg/m .     Repeat pulse at 90 bpm  Appears in no distress. Thinly built  Lips slightly dry  HEENT - Normal, including: Normal Tms, clear oropharynx and neck that is supple and without lymphadenopathy  CV-- RRR. No murmurs  Lungs- CTA  Abd - Mid upper epigastric pain. No lower quadrant tenderness     Results for orders placed or performed in visit on 03/04/19   CBC with Diff Plt ([facility])   Result Value Ref Range    WBC 3.2 (L) 4.0 - 11.0 K/uL    Lymphocytes # 1.2 0.8 - 5.3 K/uL    % Lymphocytes 37.6 20.0 - 48.0 %L    Mid # 0.3 0.0 - 2.2 K/uL    Mid % 10.6 0.0 - 20.0 %M    GRANULOCYTES # 1.7 1.6 - 8.3 K/uL    % Granulocytes 51.8 40.0 - 75.0 %G    RBC 5.03 4.40 - 5.90 M/uL    Hemoglobin 15.1 13.3 - 17.7 g/dL    Hematocrit 46.5 40.0 - 53.0 %    MCV 92.4 78.0 - 100.0 fL    MCH 30.0 26.5 - 35.0 pg    MCHC 32.5 32.0 - 36.0 g/dL    Platelets 229.0 150.0 - 450.0 K/uL    RDW 12.7 %   Hemoglobin A1c ([place])   Result Value Ref Range    Hemoglobin A1C 6.9 (H) 4.1 - 5.7 %   Urinalysis ([place])   Result Value Ref Range    Specific Gravity Urine >=1.030 1.005 - 1.030    pH Urine 5.5 4.5 - 8.0    Leukocyte Esterase UR Negative Negative    Nitrite Urine Negative Negative    Protein UR Negative Negative    Glucose Urine 2+ (A) Negative    Ketones Urine 1+ (A) Negative    Urobilinogen mg/dL 0.2 E.U./dL 0.2 E.U./dL    Bilirubin UR Negative Negative    Blood UR Trace-lysed (A) Negative        ASSESSMENT:  -43 yo with DM2 and h/o GERD who presents with morning vomiting and constipation for 7 days.   Labs are in the normal range.   The labs for \"pancreatitis\" are still pending.     PLAN:  - Restart the Omeprazole  -Also try Zofran to see if benefit on nausea and vomitting.  -Use Miralax at bedtime to help with constipation  -If symptoms do not improve in 5-7 days, then consider further testing, e.g. X-rays, gastric " emptying studies and/or CT scans.     -If no improvement, may need to recheck WBC and consider other causes of low WBC.     1. Nausea and vomiting, intractability of vomiting not specified, unspecified vomiting type    - Comprehensive metabolic panel  - CBC with Diff Plt ([facility])  - Hemoglobin A1c ([place])  - Lipase  - Amylase  - Urinalysis ([place])  - ondansetron (ZOFRAN-ODT) 4 MG ODT tab; Place 1-2 tabs under tongue every 8-12 hours as needed for nausea and vomiting  Dispense: 16 tablet; Refill: 0    2. Gastroesophageal reflux disease without esophagitis    - omeprazole (PRILOSEC) 20 MG DR capsule; Take 20 mg po daily  Dispense: 90 capsule; Refill: 1    3. Constipation, unspecified constipation type    - polyethylene glycol (MIRALAX) powder; Take 17 g (1 capful) by mouth daily  Dispense: 119 g; Refill: 0      --Bob Govea MD  [facility], Department of Family Medicine and Community Health

## 2019-05-14 ENCOUNTER — OFFICE VISIT (OUTPATIENT)
Dept: ENDOCRINOLOGY | Facility: CLINIC | Age: 44
End: 2019-05-14
Payer: COMMERCIAL

## 2019-05-14 VITALS
SYSTOLIC BLOOD PRESSURE: 114 MMHG | BODY MASS INDEX: 18.92 KG/M2 | WEIGHT: 143.4 LBS | HEART RATE: 82 BPM | DIASTOLIC BLOOD PRESSURE: 78 MMHG

## 2019-05-14 DIAGNOSIS — E13.9 LADA (LATENT AUTOIMMUNE DIABETES OF ADULTHOOD) (H): Primary | ICD-10-CM

## 2019-05-14 LAB — HBA1C MFR BLD: 7 % (ref 4.3–6)

## 2019-05-14 ASSESSMENT — PAIN SCALES - GENERAL: PAINLEVEL: NO PAIN (0)

## 2019-05-14 NOTE — PROGRESS NOTES
HPI  Piter Conway is a 44  year old male with SAMANTA here today for a follow up visit.  He was found to have an A1C of 10.6 % in 2014.    His EJ was + and C-peptide 1.0.   Pt is currently taking Metformin 1000 mg po BID.  Pt made significant changes in his lifestyle and is eating healthy and exercising.  His A1C is 7.0 % today. His previous A1C was 6.6 %.  We downloaded his glucose meter today and he has been fasting for Ramadan and his bs are higher in the am- he is eating during the middle of the night around 3 :30 - 4 am.  His FBS values have been in the high 100 range and he had a few FBS values in the low 200 range.  His blood sugars around noon have been in the 135-146 range and his blood sugars in the evening between 5 pm - 9 pm have been 118, 124, 112, 123, 114, 100 and 141.  On ROS today, he has symptoms from seasonal allergies.   He continues to eat healthy and exercise.  Pt denies frequent headaches, blurred vision, n/v, SOB,  chest pain, dysuria or hematuria.  No foot ulcers.  He denies numbness, tingling or pain in his feet or hands.    Diabetes Care  Retinopathy:yes; he was seen by Oph here in 4/2017. No changes.     Nephropathy:none; pt's urine microalbuminuria negative in Jan 2019.  Neuropathy:none.  Foot Exam:no ulcers; normal monofilamentous exam.  Taking aspirin:no  Lipids:  in 1/2019. His HDL was 50. Not taking a statin at this time.    ROS  Please see under HPI.    Allergies  No Known Allergies    Medications  Current Outpatient Medications   Medication Sig Dispense Refill     blood glucose (ONETOUCH VERIO IQ) test strip Test blood sugar twice daily OR AS DIRECTED 180 each 3     blood glucose monitoring (ONE TOUCH DELICA) lancets Use 2 times daily. 250 each 3     blood glucose monitoring (ONETOUCH VERIO) meter device kit Use to test blood sugar 1 kit 0     DiphenhydrAMINE HCl (BENADRYL ALLERGY PO) Take 1 tablet by mouth daily       fluticasone (FLONASE) 50 MCG/ACT spray Spray 2 sprays into  both nostrils daily 16 g 3     metFORMIN (GLUCOPHAGE-XR) 500 MG 24 hr tablet Take 2 tablets (1,000 mg) by mouth 2 times daily (with meals) 360 tablet 3     omeprazole (PRILOSEC) 20 MG DR capsule Take 20 mg po daily 90 capsule 1     ondansetron (ZOFRAN-ODT) 4 MG ODT tab Place 1-2 tabs under tongue every 8-12 hours as needed for nausea and vomiting 16 tablet 0     polyethylene glycol (MIRALAX) powder Take 17 g (1 capful) by mouth daily 119 g 0     polyethylene glycol (MIRALAX) powder Take 17 g (1 capful) by mouth 2 times daily as needed for constipation 510 g 1       Family History  family history includes Diabetes in his father; Hypertension in his father.    Social History  Smoke: none.  ETOH: none.    Past Medical History  1. SAMANTA - dx 2014.    Physical Exam  /78   Pulse 82   Wt 65 kg (143 lb 6.4 oz)   BMI 18.92 kg/m    Body mass index is 18.92 kg/m .    GENERAL : In no apparent distress.  SKIN: Normal.  EYES: PERRLA.    MOUTH: Moist.  NECK: No goiter.  RESP: Lungs clear to auscultation.  CARDIAC: RRR.   ABDOMEN: Nontender.   NEURO: awake, alert, responds appropriately to questions.    Moves all extremities; Gait normal.  No tremor.    EXTREMITIES: No edema.  FEET:  No ulcers; normal monofilamentous exam.    RESULTS  Creatinine   Date Value Ref Range Status   03/04/2019 0.9 0.8 - 1.5 mg/dL Final     GFR Estimate   Date Value Ref Range Status   03/04/2019 >90 >60 mL/min/[1.73_m2] Final     Comment:     Non  GFR Calc  Starting 12/18/2018, serum creatinine based estimated GFR (eGFR) will be   calculated using the Chronic Kidney Disease Epidemiology Collaboration   (CKD-EPI) equation.       Hemoglobin A1C   Date Value Ref Range Status   03/04/2019 6.9 (H) 4.1 - 5.7 % Final     Potassium   Date Value Ref Range Status   03/04/2019 4.5 3.4 - 5.3 mmol/L Final     ALT   Date Value Ref Range Status   03/04/2019 23.0 0.0 - 70.0 U/L Final     AST   Date Value Ref Range Status   03/04/2019 21.0 0.0 -  55.0 U/L Final     TSH   Date Value Ref Range Status   01/02/2019 1.60 0.40 - 4.00 mU/L Final     T4 Free   Date Value Ref Range Status   12/11/2017 1.12 0.76 - 1.46 ng/dL Final       Cholesterol   Date Value Ref Range Status   01/02/2019 160 <200 mg/dL Final   12/11/2017 103 <200 mg/dL Final     HDL Cholesterol   Date Value Ref Range Status   01/02/2019 50 >39 mg/dL Final   12/11/2017 39 (L) >39 mg/dL Final     LDL Cholesterol Calculated   Date Value Ref Range Status   01/02/2019 100 (H) <100 mg/dL Final     Comment:     Above desirable:  100-129 mg/dl  Borderline High:  130-159 mg/dL  High:             160-189 mg/dL  Very high:       >189 mg/dl     12/11/2017 55 <100 mg/dL Final     Comment:     Desirable:       <100 mg/dl     Triglycerides   Date Value Ref Range Status   01/02/2019 47 <150 mg/dL Final   12/11/2017 44 <150 mg/dL Final     Comment:     Fasting specimen     Cholesterol/HDL Ratio   Date Value Ref Range Status   08/27/2015 3.0 0.0 - 5.0 Final   09/11/2014 2.8 0.0 - 5.0 Final     A1C      7.0    5/14/2019  A1C      6.6    12/13/2018  A1C      6.2     6/13/2018  A1C      6.3     12/11/2017  A1C      5.7     8/8/2017  A1C      5.8    2/7/2017  A1C      6.0    10/11/2016  A1C      5.7    6/7/2016  A1C      6.3    3/4/2016  A1C      6.0    10/2/2015  A1C      6.6    4/2015  A1C     10.6   6/26/2014  A1C      6.0   2/6/2013  A1C      5.6   3/20/2012    ASSESSMENT/PLAN:    1.  SAMANTA:  Good glycemic control at this time with lifestyle changes and Metformin.  No need for insulin at this time.  I asked him to continue to check his blood sugar 2-3 times daily premeals and notify me if his blood sugars are above target or if he develops polydipsia, polyuria, blurred vision and decrease energy.  He is to remain on Metformin 1000 mg BID.  Encouraged pt to continues to eat healthy and exercise.  Pt is normotensive.  Pt's creat was 0.9 with  in 3/2019.  His urine microalbuminuria was negative in Jan 2019.  Feet  are in good condition with normal monofilamentous exam.  TSH also normal in Jan 2019.  Pt's  in Jan 2019 with HDL 50. Pt is not taking a statin at this time. Will recheck fasting lipid panel next visit.  Pt had the flu vaccine this season.    2. MILD NPDR: Pt seen by Oph here in 3/2016 with mild NPDR.  Most recent Oph exam stable in 4/2017.  I placed a referral for pt to see Oph here for annual diabetic eye exam.    3.  Return to Endocrine Clinic to see me in 6 months and Dr. Paul in 6 months.

## 2019-05-14 NOTE — LETTER
5/14/2019     RE: Piter Conway  [address]  [facility] [address]     Dear Colleague,    Thank you for referring your patient, Piter Conway, to the [facility] ENDOCRINOLOGY at [facility]. Please see a copy of my visit note below.    HPI  Piter Conway is a 44  year old male with SAMANTA here today for a follow up visit.  He was found to have an A1C of 10.6 % in 2014.    His EJ was + and C-peptide 1.0.   Pt is currently taking Metformin 1000 mg po BID.  Pt made significant changes in his lifestyle and is eating healthy and exercising.  His A1C is 7.0 % today. His previous A1C was 6.6 %.  We downloaded his glucose meter today and he has been fasting for Ramadan and his bs are higher in the am- he is eating during the middle of the night around 3 :30 - 4 am.  His FBS values have been in the high 100 range and he had a few FBS values in the low 200 range.  His blood sugars around noon have been in the 135-146 range and his blood sugars in the evening between 5 pm - 9 pm have been 118, 124, 112, 123, 114, 100 and 141.  On ROS today, he has symptoms from seasonal allergies.   He continues to eat healthy and exercise.  Pt denies frequent headaches, blurred vision, n/v, SOB,  chest pain, dysuria or hematuria.  No foot ulcers.  He denies numbness, tingling or pain in his feet or hands.    Diabetes Care  Retinopathy:yes; he was seen by Oph here in 4/2017. No changes.     Nephropathy:none; pt's urine microalbuminuria negative in Jan 2019.  Neuropathy:none.  Foot Exam:no ulcers; normal monofilamentous exam.  Taking aspirin:no  Lipids:  in 1/2019. His HDL was 50. Not taking a statin at this time.    ROS  Please see under HPI.    Allergies  No Known Allergies    Medications  Current Outpatient Medications   Medication Sig Dispense Refill     blood glucose (ONETOUCH VERIO IQ) test strip Test blood sugar twice daily OR AS DIRECTED 180 each 3     blood glucose monitoring (ONE TOUCH  DELICA) lancets Use 2 times daily. 250 each 3     blood glucose monitoring (ONETOUCH VERIO) meter device kit Use to test blood sugar 1 kit 0     DiphenhydrAMINE HCl (BENADRYL ALLERGY PO) Take 1 tablet by mouth daily       fluticasone (FLONASE) 50 MCG/ACT spray Spray 2 sprays into both nostrils daily 16 g 3     metFORMIN (GLUCOPHAGE-XR) 500 MG 24 hr tablet Take 2 tablets (1,000 mg) by mouth 2 times daily (with meals) 360 tablet 3     omeprazole (PRILOSEC) 20 MG DR capsule Take 20 mg po daily 90 capsule 1     ondansetron (ZOFRAN-ODT) 4 MG ODT tab Place 1-2 tabs under tongue every 8-12 hours as needed for nausea and vomiting 16 tablet 0     polyethylene glycol (MIRALAX) powder Take 17 g (1 capful) by mouth daily 119 g 0     polyethylene glycol (MIRALAX) powder Take 17 g (1 capful) by mouth 2 times daily as needed for constipation 510 g 1       Family History  family history includes Diabetes in his father; Hypertension in his father.    Social History  Smoke: none.  ETOH: none.    Past Medical History  1. SAMANTA - dx 2014.    Physical Exam  /78   Pulse 82   Wt 65 kg (143 lb 6.4 oz)   BMI 18.92 kg/m     Body mass index is 18.92 kg/m .    GENERAL : In no apparent distress.  SKIN: Normal.  EYES: PERRLA.    MOUTH: Moist.  NECK: No goiter.  RESP: Lungs clear to auscultation.  CARDIAC: RRR.   ABDOMEN: Nontender.   NEURO: awake, alert, responds appropriately to questions.    Moves all extremities; Gait normal.  No tremor.    EXTREMITIES: No edema.  FEET:  No ulcers; normal monofilamentous exam.    RESULTS  Creatinine   Date Value Ref Range Status   03/04/2019 0.9 0.8 - 1.5 mg/dL Final     GFR Estimate   Date Value Ref Range Status   03/04/2019 >90 >60 mL/min/[1.73_m2] Final     Comment:     Non  GFR Calc  Starting 12/18/2018, serum creatinine based estimated GFR (eGFR) will be   calculated using the Chronic Kidney Disease Epidemiology Collaboration   (CKD-EPI) equation.       Hemoglobin A1C   Date Value  Ref Range Status   03/04/2019 6.9 (H) 4.1 - 5.7 % Final     Potassium   Date Value Ref Range Status   03/04/2019 4.5 3.4 - 5.3 mmol/L Final     ALT   Date Value Ref Range Status   03/04/2019 23.0 0.0 - 70.0 U/L Final     AST   Date Value Ref Range Status   03/04/2019 21.0 0.0 - 55.0 U/L Final     TSH   Date Value Ref Range Status   01/02/2019 1.60 0.40 - 4.00 mU/L Final     T4 Free   Date Value Ref Range Status   12/11/2017 1.12 0.76 - 1.46 ng/dL Final       Cholesterol   Date Value Ref Range Status   01/02/2019 160 <200 mg/dL Final   12/11/2017 103 <200 mg/dL Final     HDL Cholesterol   Date Value Ref Range Status   01/02/2019 50 >39 mg/dL Final   12/11/2017 39 (L) >39 mg/dL Final     LDL Cholesterol Calculated   Date Value Ref Range Status   01/02/2019 100 (H) <100 mg/dL Final     Comment:     Above desirable:  100-129 mg/dl  Borderline High:  130-159 mg/dL  High:             160-189 mg/dL  Very high:       >189 mg/dl     12/11/2017 55 <100 mg/dL Final     Comment:     Desirable:       <100 mg/dl     Triglycerides   Date Value Ref Range Status   01/02/2019 47 <150 mg/dL Final   12/11/2017 44 <150 mg/dL Final     Comment:     Fasting specimen     Cholesterol/HDL Ratio   Date Value Ref Range Status   08/27/2015 3.0 0.0 - 5.0 Final   09/11/2014 2.8 0.0 - 5.0 Final     A1C      7.0    5/14/2019  A1C      6.6    12/13/2018  A1C      6.2     6/13/2018  A1C      6.3     12/11/2017  A1C      5.7     8/8/2017  A1C      5.8    2/7/2017  A1C      6.0    10/11/2016  A1C      5.7    6/7/2016  A1C      6.3    3/4/2016  A1C      6.0    10/2/2015  A1C      6.6    4/2015  A1C     10.6   6/26/2014  A1C      6.0   2/6/2013  A1C      5.6   3/20/2012    ASSESSMENT/PLAN:    Tobin [place]:  Good glycemic control at this time with lifestyle changes and Metformin.  No need for insulin at this time.  I asked him to continue to check his blood sugar 2-3 times daily premeals and notify me if his blood sugars are above target or if he develops  polydipsia, polyuria, blurred vision and decrease energy.  He is to remain on Metformin 1000 mg BID.  Encouraged pt to continues to eat healthy and exercise.  Pt is normotensive.  Pt's creat was 0.9 with  in 3/2019.  His urine microalbuminuria was negative in Jan 2019.  Feet are in good condition with normal monofilamentous exam.  TSH also normal in Jan 2019.  Pt's  in Jan 2019 with HDL 50. Pt is not taking a statin at this time. Will recheck fasting lipid panel next visit.  Pt had the flu vaccine this season.    2. MILD NPDR: Pt seen by Oph here in 3/2016 with mild NPDR.  Most recent Oph exam stable in 4/2017.  I placed a referral for pt to see Oph here for annual diabetic eye exam.    3.  Return to Endocrine Clinic to see me in 6 months and Dr. Paul in 6 months.      Again, thank you for allowing me to participate in the care of your patient.      Sincerely,    Sheila Brewer PA-C

## 2019-05-14 NOTE — PATIENT INSTRUCTIONS
1. Continue current dose of Metformin.  2. Continue to make healthy food choices and remain active.  3. Call if you have questions- [phone].  4. See the eye doctor for your annual diabetic eye exam.  5. See me 6 months.  Sheila Brewer PA-C

## 2019-05-22 ENCOUNTER — OFFICE VISIT (OUTPATIENT)
Dept: OPHTHALMOLOGY | Facility: CLINIC | Age: 44
End: 2019-05-22
Attending: PHYSICIAN ASSISTANT
Payer: COMMERCIAL

## 2019-05-22 DIAGNOSIS — H52.4 PRESBYOPIA: ICD-10-CM

## 2019-05-22 DIAGNOSIS — E11.9 DIABETES MELLITUS TYPE 2 WITHOUT RETINOPATHY (H): Primary | ICD-10-CM

## 2019-05-22 DIAGNOSIS — H10.13 ALLERGIC CONJUNCTIVITIS OF BOTH EYES: ICD-10-CM

## 2019-05-22 PROCEDURE — G0463 HOSPITAL OUTPT CLINIC VISIT: HCPCS | Mod: ZF

## 2019-05-22 ASSESSMENT — VISUAL ACUITY
OD_SC+: -2
OD_SC: 20/20
OS_SC+: -1
METHOD: SNELLEN - LINEAR
OS_SC: 20/20

## 2019-05-22 ASSESSMENT — CONF VISUAL FIELD
METHOD: COUNTING FINGERS
OS_NORMAL: 1
OD_NORMAL: 1

## 2019-05-22 ASSESSMENT — EXTERNAL EXAM - RIGHT EYE: OD_EXAM: NORMAL

## 2019-05-22 ASSESSMENT — SLIT LAMP EXAM - LIDS
COMMENTS: TRACE BLEPHARITIS
COMMENTS: TRACE BLEPHARITIS

## 2019-05-22 ASSESSMENT — TONOMETRY
OD_IOP_MMHG: 17
OS_IOP_MMHG: 15
IOP_METHOD: TONOPEN

## 2019-05-22 ASSESSMENT — CUP TO DISC RATIO
OS_RATIO: 0.4
OD_RATIO: 0.45

## 2019-05-22 ASSESSMENT — EXTERNAL EXAM - LEFT EYE: OS_EXAM: NORMAL

## 2019-05-22 NOTE — NURSING NOTE
Chief Complaints and History of Present Illnesses   Patient presents with     Eye Exam For Diabetes     Chief Complaint(s) and History of Present Illness(es)     Eye Exam For Diabetes     Laterality: both eyes    Associated symptoms: tearing (when he is out in the wind).  Negative for eye pain, redness and dryness    Pain scale: 0/10              Comments     Patient returns to the clinic for a diabetic eye exam.  He states that his vision has seemed stable in both eyes, since his last eye exam 2 years ago.    His last A1c was 7.0 (taken a month ago).    Sheila Harper COT 2:28 PM May 22, 2019

## 2019-05-22 NOTE — PROGRESS NOTES
FLORIDALMA Conway is a 44 year old male here for diabetic eye exam. Last exam 2 years ago - states vision stable since that time. Notes that he is holding things farther away to read. Has not tried OTC readers yet. Notes some tearing when he is out in the wind. Denies eye pain, discomfort, floaters or flashes of light.    POH: No history of eye surgery or trauma  PMH: Diabetes type 2  FH: No FH of AMD or glc  SH: Non-smoker    Lab Results   Component Value Date    A1C 6.9 03/04/2019    A1C 6.3 12/11/2017    A1C 10.6 06/26/2014    A1C 6.0 02/06/2013    A1C 5.6 03/20/2012     Assessment & Plan    (E11.9) Diabetes mellitus type 2 without retinopathy (H) (primary encounter diagnosis)  Comment: Diagnosed in 2014. On metformin. Last A1c 6.9% on 3/4/19. No diabetic retinopathy.  Plan: Discussed the importance of tight blood glucose control in the prevention of diabetic retinopathy. Recommend yearly dilated eye exam.    (H10.13) Allergic conjunctivitis, bilateral    Comment: Seasonal symptoms. Uses anti-histamine and nasal spray when needed  Plan: Ok to continue with OTC anti-allergy drops    (H52.4) Presbyopia  Comment: Noticing early presbyopic changes  Plan: Start OTC readers PRN - discussed appropriate strength of readers     -----------------------------------------------------------------------------------    Patient disposition:   Return in about 1 year (around 5/22/2020) for Annual Visit. or sooner as needed.    Gurwinder Walker MD  Ophthalmology Resident, PGY-2      Teaching statement:  Complete documentation of historical and exam elements from today's encounter can be found in the full encounter summary report (not reduplicated in this progress note). I personally obtained the chief complaint(s) and history of present illness.  I confirmed and edited as necessary the review of systems, past medical/surgical history, family history, social history, and examination findings as documented by others; and I examined the  patient myself. I personally reviewed the relevant tests, images, and reports as documented above.     I formulated and edited as necessary the assessment and plan and discussed the findings and management plan with the patient and family.    Analisa Long MD  Comprehensive Ophthalmology & Ocular Pathology  Department of Ophthalmology and Visual Neurosciences  [email or web address]  Pager [phone]

## 2019-07-12 DIAGNOSIS — E10.8 TYPE 1 DIABETES MELLITUS WITH COMPLICATION (H): Primary | ICD-10-CM

## 2019-07-12 RX ORDER — LANCETS
EACH MISCELLANEOUS
Qty: 200 EACH | Refills: 3 | Status: SHIPPED | OUTPATIENT
Start: 2019-07-12 | End: 2021-09-30

## 2019-11-07 ENCOUNTER — HEALTH MAINTENANCE LETTER (OUTPATIENT)
Age: 44
End: 2019-11-07

## 2019-11-14 ENCOUNTER — ALLIED HEALTH/NURSE VISIT (OUTPATIENT)
Dept: EDUCATION SERVICES | Facility: CLINIC | Age: 44
End: 2019-11-14
Payer: COMMERCIAL

## 2019-11-14 ENCOUNTER — OFFICE VISIT (OUTPATIENT)
Dept: ENDOCRINOLOGY | Facility: CLINIC | Age: 44
End: 2019-11-14
Payer: COMMERCIAL

## 2019-11-14 VITALS
HEIGHT: 73 IN | DIASTOLIC BLOOD PRESSURE: 71 MMHG | SYSTOLIC BLOOD PRESSURE: 102 MMHG | BODY MASS INDEX: 18.61 KG/M2 | HEART RATE: 85 BPM | WEIGHT: 140.4 LBS

## 2019-11-14 DIAGNOSIS — E13.9 LADA (LATENT AUTOIMMUNE DIABETES MELLITUS IN ADULTS) (H): Primary | ICD-10-CM

## 2019-11-14 DIAGNOSIS — E13.9 LADA (LATENT AUTOIMMUNE DIABETES MELLITUS IN ADULTS) (H): ICD-10-CM

## 2019-11-14 LAB
ALBUMIN UR-MCNC: NEGATIVE MG/DL
ALT SERPL W P-5'-P-CCNC: 18 U/L (ref 0–70)
ANION GAP SERPL CALCULATED.3IONS-SCNC: 1 MMOL/L (ref 3–14)
APPEARANCE UR: ABNORMAL
AST SERPL W P-5'-P-CCNC: 9 U/L (ref 0–45)
BILIRUB UR QL STRIP: NEGATIVE
C PEPTIDE SERPL-MCNC: 0.8 NG/ML (ref 0.9–6.9)
CHLORIDE SERPL-SCNC: 104 MMOL/L (ref 94–109)
CO2 SERPL-SCNC: 32 MMOL/L (ref 20–32)
COLOR UR AUTO: YELLOW
CREAT SERPL-MCNC: 0.74 MG/DL (ref 0.66–1.25)
GFR SERPL CREATININE-BSD FRML MDRD: >90 ML/MIN/{1.73_M2}
GLUCOSE SERPL-MCNC: 144 MG/DL (ref 70–99)
GLUCOSE UR STRIP-MCNC: NEGATIVE MG/DL
HBA1C MFR BLD: 7.4 % (ref 4.3–6)
HGB UR QL STRIP: ABNORMAL
KETONES UR STRIP-MCNC: 5 MG/DL
LEUKOCYTE ESTERASE UR QL STRIP: NEGATIVE
MUCOUS THREADS #/AREA URNS LPF: PRESENT /LPF
NITRATE UR QL: NEGATIVE
PH UR STRIP: 5 PH (ref 5–7)
POTASSIUM SERPL-SCNC: 4.2 MMOL/L (ref 3.4–5.3)
RBC #/AREA URNS AUTO: 3 /HPF (ref 0–2)
SODIUM SERPL-SCNC: 137 MMOL/L (ref 133–144)
SOURCE: ABNORMAL
SP GR UR STRIP: 1.02 (ref 1–1.03)
TSH SERPL DL<=0.005 MIU/L-ACNC: 1.47 MU/L (ref 0.4–4)
UROBILINOGEN UR STRIP-MCNC: 0 MG/DL (ref 0–2)
WBC #/AREA URNS AUTO: <1 /HPF (ref 0–5)

## 2019-11-14 ASSESSMENT — PAIN SCALES - GENERAL: PAINLEVEL: NO PAIN (0)

## 2019-11-14 ASSESSMENT — MIFFLIN-ST. JEOR: SCORE: 1580.73

## 2019-11-14 NOTE — LETTER
11/14/2019       RE: Piter Conway  [address]  [facility] [id number]     Dear Colleague,    Thank you for referring your patient, Piter Conway, to the [facility] ENDOCRINOLOGY at [facility]. Please see a copy of my visit note below.    HPI  Piter Conway is a 44  year old male with SAMANTA here today for a follow up visit.  He was last seen in our clinic in May 2019.  He was found to have an A1C of 10.6 % in 2014 at time of diagnosis.    His EJ was + and C-peptide 1.0.   Pt is currently taking Metformin 1000 mg po BID.  Pt made significant changes in his lifestyle and is eating healthy and exercising.  I am concerned he is not eating enough given his BMI of 18 to avoid insulin.  His A1C is 7.4 % today. His previous A1C was 7.0 %.  We downloaded his glucose meter today and his fasting blood sugars are in the 140-150 + range and his evening blood sugar values have been in the low 200 range.  On ROS today, pt reports fatigue and his BMI is only 18 kg/m2 today.  He had a recent episode of dizziness after moving items in his home and working for long hours. No recurrence.   No SOB or chest pain.  Some polyuria; no polydipsia or blurred vision.  Some intermittent nausea; no vomiting.  Pt denies frequent headaches, SOB, chest pain, dysuria or hematuria.  No foot ulcers.  He denies numbness, tingling or pain in his feet or hands.    Diabetes Care  Retinopathy:yes; he was seen by Oph last year. No changes.     Nephropathy:none; pt's urine microalbuminuria negative in Jan 2019.  Neuropathy:none.  Foot Exam:no ulcers; normal monofilamentous exam.  Taking aspirin:no  Lipids:  in 1/2019. His HDL was 50. Not taking a statin at this time.    ROS  Please see under HPI.    Allergies  No Known Allergies    Medications  Current Outpatient Medications   Medication Sig Dispense Refill     blood glucose (NO BRAND SPECIFIED) test strip Use to test blood sugar 2 times daily or as directed.  "Whatever is covered by insurance 200 strip 3     blood glucose (ONETOUCH VERIO IQ) test strip Test blood sugar twice daily OR AS DIRECTED 180 each 3     blood glucose calibration (NO BRAND SPECIFIED) solution To accompany: whatever is covered by insurance test weekly 1 Bottle 3     blood glucose monitoring (NO BRAND SPECIFIED) meter device kit Use to test blood sugar 2 times daily or as directed. With whatever is covered 1 kit 0     blood glucose monitoring (ONE TOUCH DELICA) lancets Use 2 times daily. 250 each 3     blood glucose monitoring (ONETOUCH VERIO) meter device kit Use to test blood sugar 1 kit 0     DiphenhydrAMINE HCl (BENADRYL ALLERGY PO) Take 1 tablet by mouth daily       fluticasone (FLONASE) 50 MCG/ACT spray Spray 2 sprays into both nostrils daily 16 g 3     insulin glargine (LANTUS SOLOSTAR) 100 UNIT/ML pen Inject 10 units subcutaneous each am. 15 mL 3     insulin pen needle (BD MICH U/F) 32G X 4 MM miscellaneous Use daily. 250 each 3     metFORMIN (GLUCOPHAGE-XR) 500 MG 24 hr tablet Take 2 tablets (1,000 mg) by mouth 2 times daily (with meals) 360 tablet 3     omeprazole (PRILOSEC) 20 MG DR capsule Take 20 mg po daily 90 capsule 1     ondansetron (ZOFRAN-ODT) 4 MG ODT tab Place 1-2 tabs under tongue every 8-12 hours as needed for nausea and vomiting 16 tablet 0     polyethylene glycol (MIRALAX) powder Take 17 g (1 capful) by mouth 2 times daily as needed for constipation 510 g 1     thin (NO BRAND SPECIFIED) lancets Use with lanceting device. whatever is covered by insurance  Testing 2 times daily 200 each 3     polyethylene glycol (MIRALAX) powder Take 17 g (1 capful) by mouth daily (Patient not taking: Reported on 11/14/2019) 119 g 0       Family History  family history includes Diabetes in his father; Hypertension in his father.    Social History  Smoke: none.  ETOH: none.    Past Medical History  1. SAMANTA - dx 2014.    Physical Exam  /71   Pulse 85   Ht 1.854 m (6' 1\")   Wt 63.7 kg (140 " lb 6.4 oz)   BMI 18.52 kg/m     Body mass index is 18.52 kg/m .    GENERAL : In no apparent distress.  SKIN: Normal.  EYES: PERRLA.    MOUTH: Moist.  NECK: No goiter.  RESP: Lungs clear to auscultation.  CARDIAC: RRR.   ABDOMEN: Nontender.   NEURO: awake, alert, responds appropriately to questions.    Moves all extremities; Gait normal.  No tremor.    EXTREMITIES: No edema.  FEET:  No ulcers; normal monofilamentous exam.    RESULTS  Creatinine   Date Value Ref Range Status   11/14/2019 0.74 0.66 - 1.25 mg/dL Final     GFR Estimate   Date Value Ref Range Status   11/14/2019 >90 >60 mL/min/[1.73_m2] Final     Comment:     Non  GFR Calc  Starting 12/18/2018, serum creatinine based estimated GFR (eGFR) will be   calculated using the Chronic Kidney Disease Epidemiology Collaboration   (CKD-EPI) equation.       Hemoglobin A1C   Date Value Ref Range Status   03/04/2019 6.9 (H) 4.1 - 5.7 % Final     Potassium   Date Value Ref Range Status   11/14/2019 4.2 3.4 - 5.3 mmol/L Final     ALT   Date Value Ref Range Status   11/14/2019 18 0 - 70 U/L Final     AST   Date Value Ref Range Status   11/14/2019 9 0 - 45 U/L Final     TSH   Date Value Ref Range Status   11/14/2019 1.47 0.40 - 4.00 mU/L Final     T4 Free   Date Value Ref Range Status   12/11/2017 1.12 0.76 - 1.46 ng/dL Final       Cholesterol   Date Value Ref Range Status   01/02/2019 160 <200 mg/dL Final   12/11/2017 103 <200 mg/dL Final     HDL Cholesterol   Date Value Ref Range Status   01/02/2019 50 >39 mg/dL Final   12/11/2017 39 (L) >39 mg/dL Final     LDL Cholesterol Calculated   Date Value Ref Range Status   01/02/2019 100 (H) <100 mg/dL Final     Comment:     Above desirable:  100-129 mg/dl  Borderline High:  130-159 mg/dL  High:             160-189 mg/dL  Very high:       >189 mg/dl     12/11/2017 55 <100 mg/dL Final     Comment:     Desirable:       <100 mg/dl     Triglycerides   Date Value Ref Range Status   01/02/2019 47 <150 mg/dL Final    12/11/2017 44 <150 mg/dL Final     Comment:     Fasting specimen     Cholesterol/HDL Ratio   Date Value Ref Range Status   08/27/2015 3.0 0.0 - 5.0 Final   09/11/2014 2.8 0.0 - 5.0 Final     A1C      7.4    11/14/2019  A1C      7.0    5/14/2019  A1C      6.6    12/13/2018  A1C      6.2     6/13/2018  A1C      6.3     12/11/2017  A1C      5.7     8/8/2017  A1C      5.8    2/7/2017  A1C      6.0    10/11/2016  A1C      5.7    6/7/2016  A1C      6.3    3/4/2016  A1C      6.0    10/2/2015  A1C      6.6    4/2015  A1C     10.6   6/26/2014  A1C      6.0   2/6/2013  A1C      5.6   3/20/2012    ASSESSMENT/PLAN:    1.  SAMANTA:  Piter's blood sugar values are higher at this time.  Will start Lantus 10 units subcutaneous each am and continue Metformin 1000 mg BID.  He is to meet with our CDE today to review how to use insulin and for additional teaching. He will probably need meal time insulin in the near future.  Pt instructed to send me his blood sugar readings via [employer] next Tuesday for review.  His electrolytes were stable today and his glucose in the lab was 144 today.  His UA showed ketones, but no glucose. He is not eating much to avoid insulin.  I did place an order for a C-peptide today.  Pt's creat is 0.74 with GFR > 90 mL/min today.  His urine microalbuminuria was negative in Jan 2019.  Feet are in good condition with normal monofilamentous exam.  TSH is normal today.  Pt's  in Jan 2019 with HDL 50. Pt is not taking a statin at this time.  Pt had the flu vaccine this season.    2. MILD NPDR: Pt seen by Oph here in 3/2016 with mild NPDR.  Pt seen by Oph this year with stable exam per patient.    3.  Return to Endocrine Clinic to see me in 2 weeks.        Again, thank you for allowing me to participate in the care of your patient.      Sincerely,    Sheila Brewer PA-C

## 2019-11-14 NOTE — PATIENT INSTRUCTIONS
Start Lantus 10 units inject each am only.  Continue current dose of Metformin.  Send me your blood sugar reading via [employer] next Tuesday.  Check your blood sugar in the am before you eat or drink anything, before lunch and before dinner DAILY.  Labs today.  See diabetes educator Keshia ZAMORA today at 3:30 on this floor.  See me in clinic in 2 weeks.  Sheila Brewer PA-C

## 2019-11-14 NOTE — NURSING NOTE
Chief Complaint   Patient presents with     RECHECK     Type 1 Diabetes     Capillary puncture performed for Hemoglobin A1C test. Patient tolerated well.    Alana Nava MA

## 2019-11-14 NOTE — PROGRESS NOTES
FLORIDALMA  Piter Conway is a 44  year old male with SAMANTA here today for a follow up visit.  He was last seen in our clinic in May 2019.  He was found to have an A1C of 10.6 % in 2014 at time of diagnosis.    His EJ was + and C-peptide 1.0.   Pt is currently taking Metformin 1000 mg po BID.  Pt made significant changes in his lifestyle and is eating healthy and exercising.  I am concerned he is not eating enough given his BMI of 18 to avoid insulin.  His A1C is 7.4 % today. His previous A1C was 7.0 %.  We downloaded his glucose meter today and his fasting blood sugars are in the 140-150 + range and his evening blood sugar values have been in the low 200 range.  On ROS today, pt reports fatigue and his BMI is only 18 kg/m2 today.  He had a recent episode of dizziness after moving items in his home and working for long hours. No recurrence.   No SOB or chest pain.  Some polyuria; no polydipsia or blurred vision.  Some intermittent nausea; no vomiting.  Pt denies frequent headaches, SOB, chest pain, dysuria or hematuria.  No foot ulcers.  He denies numbness, tingling or pain in his feet or hands.    Diabetes Care  Retinopathy:yes; he was seen by Oph last year. No changes.     Nephropathy:none; pt's urine microalbuminuria negative in Jan 2019.  Neuropathy:none.  Foot Exam:no ulcers; normal monofilamentous exam.  Taking aspirin:no  Lipids:  in 1/2019. His HDL was 50. Not taking a statin at this time.    ROS  Please see under HPI.    Allergies  No Known Allergies    Medications  Current Outpatient Medications   Medication Sig Dispense Refill     blood glucose (NO BRAND SPECIFIED) test strip Use to test blood sugar 2 times daily or as directed. Whatever is covered by insurance 200 strip 3     blood glucose (ONETOUCH VERIO IQ) test strip Test blood sugar twice daily OR AS DIRECTED 180 each 3     blood glucose calibration (NO BRAND SPECIFIED) solution To accompany: whatever is covered by insurance test weekly 1 Bottle 3      "blood glucose monitoring (NO BRAND SPECIFIED) meter device kit Use to test blood sugar 2 times daily or as directed. With whatever is covered 1 kit 0     blood glucose monitoring (ONE TOUCH DELICA) lancets Use 2 times daily. 250 each 3     blood glucose monitoring (ONETOUCH VERIO) meter device kit Use to test blood sugar 1 kit 0     DiphenhydrAMINE HCl (BENADRYL ALLERGY PO) Take 1 tablet by mouth daily       fluticasone (FLONASE) 50 MCG/ACT spray Spray 2 sprays into both nostrils daily 16 g 3     insulin glargine (LANTUS SOLOSTAR) 100 UNIT/ML pen Inject 10 units subcutaneous each am. 15 mL 3     insulin pen needle (BD MICH U/F) 32G X 4 MM miscellaneous Use daily. 250 each 3     metFORMIN (GLUCOPHAGE-XR) 500 MG 24 hr tablet Take 2 tablets (1,000 mg) by mouth 2 times daily (with meals) 360 tablet 3     omeprazole (PRILOSEC) 20 MG DR capsule Take 20 mg po daily 90 capsule 1     ondansetron (ZOFRAN-ODT) 4 MG ODT tab Place 1-2 tabs under tongue every 8-12 hours as needed for nausea and vomiting 16 tablet 0     polyethylene glycol (MIRALAX) powder Take 17 g (1 capful) by mouth 2 times daily as needed for constipation 510 g 1     thin (NO BRAND SPECIFIED) lancets Use with lanceting device. whatever is covered by insurance  Testing 2 times daily 200 each 3     polyethylene glycol (MIRALAX) powder Take 17 g (1 capful) by mouth daily (Patient not taking: Reported on 11/14/2019) 119 g 0       Family History  family history includes Diabetes in his father; Hypertension in his father.    Social History  Smoke: none.  ETOH: none.    Past Medical History  1. SAMANTA - dx 2014.    Physical Exam  /71   Pulse 85   Ht 1.854 m (6' 1\")   Wt 63.7 kg (140 lb 6.4 oz)   BMI 18.52 kg/m    Body mass index is 18.52 kg/m .    GENERAL : In no apparent distress.  SKIN: Normal.  EYES: PERRLA.    MOUTH: Moist.  NECK: No goiter.  RESP: Lungs clear to auscultation.  CARDIAC: RRR.   ABDOMEN: Nontender.   NEURO: awake, alert, responds " appropriately to questions.    Moves all extremities; Gait normal.  No tremor.    EXTREMITIES: No edema.  FEET:  No ulcers; normal monofilamentous exam.    RESULTS  Creatinine   Date Value Ref Range Status   11/14/2019 0.74 0.66 - 1.25 mg/dL Final     GFR Estimate   Date Value Ref Range Status   11/14/2019 >90 >60 mL/min/[1.73_m2] Final     Comment:     Non  GFR Calc  Starting 12/18/2018, serum creatinine based estimated GFR (eGFR) will be   calculated using the Chronic Kidney Disease Epidemiology Collaboration   (CKD-EPI) equation.       Hemoglobin A1C   Date Value Ref Range Status   03/04/2019 6.9 (H) 4.1 - 5.7 % Final     Potassium   Date Value Ref Range Status   11/14/2019 4.2 3.4 - 5.3 mmol/L Final     ALT   Date Value Ref Range Status   11/14/2019 18 0 - 70 U/L Final     AST   Date Value Ref Range Status   11/14/2019 9 0 - 45 U/L Final     TSH   Date Value Ref Range Status   11/14/2019 1.47 0.40 - 4.00 mU/L Final     T4 Free   Date Value Ref Range Status   12/11/2017 1.12 0.76 - 1.46 ng/dL Final       Cholesterol   Date Value Ref Range Status   01/02/2019 160 <200 mg/dL Final   12/11/2017 103 <200 mg/dL Final     HDL Cholesterol   Date Value Ref Range Status   01/02/2019 50 >39 mg/dL Final   12/11/2017 39 (L) >39 mg/dL Final     LDL Cholesterol Calculated   Date Value Ref Range Status   01/02/2019 100 (H) <100 mg/dL Final     Comment:     Above desirable:  100-129 mg/dl  Borderline High:  130-159 mg/dL  High:             160-189 mg/dL  Very high:       >189 mg/dl     12/11/2017 55 <100 mg/dL Final     Comment:     Desirable:       <100 mg/dl     Triglycerides   Date Value Ref Range Status   01/02/2019 47 <150 mg/dL Final   12/11/2017 44 <150 mg/dL Final     Comment:     Fasting specimen     Cholesterol/HDL Ratio   Date Value Ref Range Status   08/27/2015 3.0 0.0 - 5.0 Final   09/11/2014 2.8 0.0 - 5.0 Final     A1C      7.4    11/14/2019  A1C      7.0    5/14/2019  A1C      6.6     12/13/2018  A1C      6.2     6/13/2018  A1C      6.3     12/11/2017  A1C      5.7     8/8/2017  A1C      5.8    2/7/2017  A1C      6.0    10/11/2016  A1C      5.7    6/7/2016  A1C      6.3    3/4/2016  A1C      6.0    10/2/2015  A1C      6.6    4/2015  A1C     10.6   6/26/2014  A1C      6.0   2/6/2013  A1C      5.6   3/20/2012    ASSESSMENT/PLAN:    1.  SAMANTA:  Piter's blood sugar values are higher at this time.  Will start Lantus 10 units subcutaneous each am and continue Metformin 1000 mg BID.  He is to meet with our CDE today to review how to use insulin and for additional teaching. He will probably need meal time insulin in the near future.  Pt instructed to send me his blood sugar readings via [employer] next Tuesday for review.  His electrolytes were stable today and his glucose in the lab was 144 today.  His UA showed ketones, but no glucose. He is not eating much to avoid insulin.  I did place an order for a C-peptide today.  Pt's creat is 0.74 with GFR > 90 mL/min today.  His urine microalbuminuria was negative in Jan 2019.  Feet are in good condition with normal monofilamentous exam.  TSH is normal today.  Pt's  in Jan 2019 with HDL 50. Pt is not taking a statin at this time.  Pt had the flu vaccine this season.    2. MILD NPDR: Pt seen by Oph here in 3/2016 with mild NPDR.  Pt seen by Oph this year with stable exam per patient.    3.  Return to Endocrine Clinic to see me in 2 weeks.

## 2019-11-14 NOTE — PATIENT INSTRUCTIONS
1.  Start your Lantus insulin at 10 units daily.    2.  Check your blood sugar before meals and at bed.  Your blood sugar goals are:  Before meals  2 hours after a meal: <180    3.   glucose tablets at the pharmacy and carry them with you.    4.  Follow up with Stephie if 2-4 weeks.    5.  Consider a Claudia Flash Sensor.  It is covered by your insurance.    Keshia Zuluaga [place] [address]  Phone: [phone]  [email or web address]

## 2019-11-14 NOTE — PROGRESS NOTES
"Diabetes Self-Management Education & Support    Diabetes Education Self Management & Training    SUBJECTIVE/OBJECTIVE:  Presents for: Individual review  Accompanied by: Self  Diabetes management related comments/concerns: getting good numbers, better control  Other concerns:: English as a second language  Cultural Influences/Ethnic Background:  American     Diabetes Symptoms & Complications   Patient Problem List and Family Medical History reviewed for relevant medical history, current medical status, and diabetes risk factors.    Vitals:    Estimated body mass index is 18.52 kg/m  as calculated from the following:    Height as of an earlier encounter on 11/14/19: 1.854 m (6' 1\").    Weight as of an earlier encounter on 11/14/19: 63.7 kg (140 lb 6.4 oz).   Last 3 BP:   BP Readings from Last 3 Encounters:   11/14/19 102/71   05/14/19 114/78   03/04/19 116/79       History   Smoking Status     Never Smoker   Smokeless Tobacco     Never Used       Labs:  Lab Results   Component Value Date    A1C 6.9 03/04/2019     Lab Results   Component Value Date     11/14/2019     Lab Results   Component Value Date     01/02/2019     HDL Cholesterol   Date Value Ref Range Status   01/02/2019 50 >39 mg/dL Final   ]  GFR Estimate   Date Value Ref Range Status   11/14/2019 >90 >60 mL/min/[1.73_m2] Final     Comment:     Non  GFR Calc  Starting 12/18/2018, serum creatinine based estimated GFR (eGFR) will be   calculated using the Chronic Kidney Disease Epidemiology Collaboration   (CKD-EPI) equation.       GFR Estimate If Black   Date Value Ref Range Status   11/14/2019 >90 >60 mL/min/[1.73_m2] Final     Comment:      GFR Calc  Starting 12/18/2018, serum creatinine based estimated GFR (eGFR) will be   calculated using the Chronic Kidney Disease Epidemiology Collaboration   (CKD-EPI) equation.       Lab Results   Component Value Date    CR 0.74 11/14/2019     No results found for: " MICROALBUMIN    Healthy Eating  Breakfast: 4 peanut butter toast, 1/2 glass of juice or 1/2 glass milk or eggs orcereal 1 bowl of Cheerios with milk(6:30 a)  Lunch: rice with chicken, whole grain spaghetti with chicken or steak or goat, water or fruit smoothie (apple, orange, berries)(3pm)  Dinner: kidney beans with whole grain bread or eggs and bread, 1/2 glass milk(6-7p)  Snacks: 1 cup tea and 2 cookies or chocolate  Beverages: Water, Tea, Milk, Juice, Coffee    Being Active  Being Active Assessed Today: Yes  Exercise:: Yes  Days per week of moderate to strenuous exercise (like a brisk walk): 7  On average, minutes per day of exercise at this level: 30  How intense was your typical exercise? : (situps, push ups and lifts)  Exercise Minutes per Week: 210    Monitoring  Blood Glucose Meter: Accu-check  Home Glucose (Sugar) Monitorin-2 times per day  Low Glucose Range (mg/dL):   High Glucose Range (mg/dL): >200    Taking Medications  Diabetes Medication(s)     Biguanides       metFORMIN (GLUCOPHAGE-XR) 500 MG 24 hr tablet    Take 2 tablets (1,000 mg) by mouth 2 times daily (with meals)    Insulin       insulin glargine (LANTUS SOLOSTAR) 100 UNIT/ML pen    Inject 10 units subcutaneous each am.          Current Treatments: Oral Agent (dual therapy)  Problems taking diabetes medications regularly?: Yes  Diabetes medication side effects?: No  Treatment Compliance: All of the time    Problem Solving  Problem Solving Assessed Today: Yes  Hypoglycemia Frequency: Never  Patient carries a carbohydrate source: No    Healthy Coping     Patient Activation Measure Survey Score:  No flowsheet data found.    ASSESSMENT:  SAMANTA, a1c rising    Patient's most recent   Lab Results   Component Value Date    A1C 6.9 2019    is not meeting goal of <7.0    INTERVENTION:   Diabetes knowledge and skills assessment:     Patient is knowledgeable in diabetes management concepts related to: Taking Medication    Patient needs  "further education on the following diabetes management concepts: Healthy Eating and Problem Solving    Based on learning assessment above, most appropriate setting for further diabetes education would be: Individual setting.    Education provided today on:  We did some basic review of diabetes and where he is at in the progression of it.  Piter seemed interested in this.  He asked questions and seemed to understand the material.      Some time was spent explaining a basal-bolus insulin regimen and how that works with particular attention to timing which will become important when he starts mealtime insulin.      Explained some of the differences between insulins.  Explained the action and timing of Lantus insulin.  Demonstrated applying pen needle, dialing up the 2 unit \"airshot\", dialing up dose to be given, injecting insulin and instructed to hold in the skin for 10 seconds after pushing in dose.   Discussed safe and legal disposal of needles.   Discussed the storage and disposal of insulin pens, including \"good to\" date.   Discussed the recognition and treatment of hypoglycemia, including carrying meter and fast-acting form of carbohydrate at all times.   Discussed plan for follow up.   Discussed goals for BG's and probable need for adjustment of insulin.      A Claudia would be covered by his Claudia and that was demonstrated today.  This may be helpful to get more data in the future.    Opportunities for ongoing education and support in diabetes-self management were discussed.    Pt verbalized understanding of concepts discussed and recommendations provided today.         PLAN:  See Patient Instructions for co-developed, patient-stated behavior change goals.  AVS printed and provided to patient today. See Follow-Up section for recommended follow-up.    Time Spent: 60 minutes  Encounter Type: Individual    Any diabetes medication dose changes were made via the CDE Protocol and Collaborative Practice Agreement with " the patient's referring provider. A copy of this encounter was shared with the provider.

## 2019-11-16 LAB — GAD65 AB SER IA-ACNC: 41.2 IU/ML (ref 0–5)

## 2019-12-04 ENCOUNTER — OFFICE VISIT (OUTPATIENT)
Dept: ENDOCRINOLOGY | Facility: CLINIC | Age: 44
End: 2019-12-04
Payer: COMMERCIAL

## 2019-12-04 VITALS
WEIGHT: 145.8 LBS | BODY MASS INDEX: 19.24 KG/M2 | DIASTOLIC BLOOD PRESSURE: 80 MMHG | SYSTOLIC BLOOD PRESSURE: 120 MMHG | HEART RATE: 80 BPM

## 2019-12-04 DIAGNOSIS — E10.65 TYPE 1 DIABETES MELLITUS WITH HYPERGLYCEMIA (H): Primary | ICD-10-CM

## 2019-12-04 DIAGNOSIS — E10.65 TYPE 1 DIABETES MELLITUS WITH HYPERGLYCEMIA (H): ICD-10-CM

## 2019-12-04 DIAGNOSIS — E13.9 LADA (LATENT AUTOIMMUNE DIABETES MELLITUS IN ADULTS) (H): ICD-10-CM

## 2019-12-04 LAB
ALBUMIN UR-MCNC: NEGATIVE MG/DL
APPEARANCE UR: ABNORMAL
BILIRUB UR QL STRIP: NEGATIVE
COLOR UR AUTO: YELLOW
GLUCOSE UR STRIP-MCNC: >499 MG/DL
HGB UR QL STRIP: NEGATIVE
KETONES UR STRIP-MCNC: 5 MG/DL
LEUKOCYTE ESTERASE UR QL STRIP: NEGATIVE
MUCOUS THREADS #/AREA URNS LPF: PRESENT /LPF
NITRATE UR QL: NEGATIVE
PH UR STRIP: 5 PH (ref 5–7)
RBC #/AREA URNS AUTO: 2 /HPF (ref 0–2)
SOURCE: ABNORMAL
SP GR UR STRIP: 1.01 (ref 1–1.03)
UROBILINOGEN UR STRIP-MCNC: 0 MG/DL (ref 0–2)
WBC #/AREA URNS AUTO: 1 /HPF (ref 0–5)

## 2019-12-04 RX ORDER — METFORMIN HCL 500 MG
TABLET, EXTENDED RELEASE 24 HR ORAL
Qty: 360 TABLET | Refills: 3 | COMMUNITY
Start: 2019-12-04 | End: 2019-12-18

## 2019-12-04 ASSESSMENT — PAIN SCALES - GENERAL: PAINLEVEL: NO PAIN (0)

## 2019-12-04 NOTE — PROGRESS NOTES
FLORIDALMA  Piter Conway is a 44  year old thin male with SAMANTA here today for a follow up visit.  He was last seen in our clinic in Nov 2019. His blood sugar values and A1C were higher. I started him on Lantus 10 units subcutaneous each am and he was to remain on Metformin 1000 mg BID.  He was found to have an A1C of 10.6 % in 2014 at time of diagnosis.    His EJ was + and C-peptide 1.0.   He was treated with Metformin alone for many years until last visit when Lantus was added.  Pt made significant changes in his lifestyle and eats healthy and exercises on a regular basis.   I was concerned he was not eating enough given his BMI of 18 to avoid insulin.  Since his last visit when I added Lantus he has been eating more and reports feeling good.  His A1C was 7.4 % on 11/14/2019.  His previous A1C was 7.0 %.  We downloaded his glucose meter today and his fasting blood sugars are 132, 116, 108, 117, 128, 130, 131 6.  His blood sugars around lunch have been 71, 108, 117, 117, 91, 74, 99, 109 and 106.  His predinner blood sugar values have been 150, 96, 178, 140, 156, 176 and 128.  No hypoglycemia.  On ROS today, pt reports feeling better and has more energy. He has gained 5 lbs since his last visit.  No polyuria, polydipsia or blurred vision at this time.  Pt denies frequent headaches, n/v,SOB,cough, chest pain,abd pain, diarrhea,dysuria or hematuria.  No foot ulcers.  He denies numbness, tingling or pain in his feet or hands.    Diabetes Care  Retinopathy:none; he was seen by [facility] in May 2019. Nephropathy:none; pt's urine microalbuminuria negative in Jan 2019.  Neuropathy:none.  Foot Exam:no ulcers; normal monofilamentous exam.  Taking aspirin:no  Lipids:  in 1/2019. His HDL was 50. Not taking a statin at this time.    ROS  Please see under HPI.    Allergies  No Known Allergies    Medications  Current Outpatient Medications   Medication Sig Dispense Refill     blood glucose (NO BRAND SPECIFIED) test strip Use to test blood  sugar 2 times daily or as directed. Whatever is covered by insurance 200 strip 3     blood glucose (ONETOUCH VERIO IQ) test strip Test blood sugar twice daily OR AS DIRECTED 180 each 3     blood glucose calibration (NO BRAND SPECIFIED) solution To accompany: whatever is covered by insurance test weekly 1 Bottle 3     blood glucose monitoring (NO BRAND SPECIFIED) meter device kit Use to test blood sugar 2 times daily or as directed. With whatever is covered 1 kit 0     blood glucose monitoring (ONE TOUCH DELICA) lancets Use 2 times daily. 250 each 3     blood glucose monitoring (ONETOUCH VERIO) meter device kit Use to test blood sugar 1 kit 0     DiphenhydrAMINE HCl (BENADRYL ALLERGY PO) Take 1 tablet by mouth daily       fluticasone (FLONASE) 50 MCG/ACT spray Spray 2 sprays into both nostrils daily 16 g 3     insulin glargine (LANTUS SOLOSTAR) 100 UNIT/ML pen Inject 10 units subcutaneous each am. 15 mL 3     insulin pen needle (BD MICH U/F) 32G X 4 MM miscellaneous Use daily. 250 each 3     metFORMIN (GLUCOPHAGE-XR) 500 MG 24 hr tablet Take 2 tabs each am only. 360 tablet 3     omeprazole (PRILOSEC) 20 MG DR capsule Take 20 mg po daily 90 capsule 1     ondansetron (ZOFRAN-ODT) 4 MG ODT tab Place 1-2 tabs under tongue every 8-12 hours as needed for nausea and vomiting 16 tablet 0     polyethylene glycol (MIRALAX) powder Take 17 g (1 capful) by mouth 2 times daily as needed for constipation 510 g 1     thin (NO BRAND SPECIFIED) lancets Use with lanceting device. whatever is covered by insurance  Testing 2 times daily 200 each 3     polyethylene glycol (MIRALAX) powder Take 17 g (1 capful) by mouth daily (Patient not taking: Reported on 11/14/2019) 119 g 0       Family History  family history includes Diabetes in his father; Hypertension in his father.    Social History  Smoke: none.  ETOH: none.    Past Medical History  1. SAMANTA - dx 2014.    Physical Exam  /80   Pulse 80   Wt 66.1 kg (145 lb 12.8 oz)   BMI  19.24 kg/m    Body mass index is 19.24 kg/m .    GENERAL : In no apparent distress.  SKIN: Normal.  EYES: PERRLA.    MOUTH: Moist.  NECK: No goiter.  RESP: Lungs clear to auscultation.  CARDIAC: RRR.   ABDOMEN: Nontender.   NEURO: awake, alert, responds appropriately to questions.    Moves all extremities; Gait normal.  No tremor.    EXTREMITIES: No edema.  FEET:  No ulcers; normal monofilamentous exam.    RESULTS  Creatinine   Date Value Ref Range Status   11/14/2019 0.74 0.66 - 1.25 mg/dL Final     GFR Estimate   Date Value Ref Range Status   11/14/2019 >90 >60 mL/min/[1.73_m2] Final     Comment:     Non  GFR Calc  Starting 12/18/2018, serum creatinine based estimated GFR (eGFR) will be   calculated using the Chronic Kidney Disease Epidemiology Collaboration   (CKD-EPI) equation.       Hemoglobin A1C   Date Value Ref Range Status   03/04/2019 6.9 (H) 4.1 - 5.7 % Final     Potassium   Date Value Ref Range Status   11/14/2019 4.2 3.4 - 5.3 mmol/L Final     ALT   Date Value Ref Range Status   11/14/2019 18 0 - 70 U/L Final     AST   Date Value Ref Range Status   11/14/2019 9 0 - 45 U/L Final     TSH   Date Value Ref Range Status   11/14/2019 1.47 0.40 - 4.00 mU/L Final     T4 Free   Date Value Ref Range Status   12/11/2017 1.12 0.76 - 1.46 ng/dL Final       Cholesterol   Date Value Ref Range Status   01/02/2019 160 <200 mg/dL Final   12/11/2017 103 <200 mg/dL Final     HDL Cholesterol   Date Value Ref Range Status   01/02/2019 50 >39 mg/dL Final   12/11/2017 39 (L) >39 mg/dL Final     LDL Cholesterol Calculated   Date Value Ref Range Status   01/02/2019 100 (H) <100 mg/dL Final     Comment:     Above desirable:  100-129 mg/dl  Borderline High:  130-159 mg/dL  High:             160-189 mg/dL  Very high:       >189 mg/dl     12/11/2017 55 <100 mg/dL Final     Comment:     Desirable:       <100 mg/dl     Triglycerides   Date Value Ref Range Status   01/02/2019 47 <150 mg/dL Final   12/11/2017 44 <150  mg/dL Final     Comment:     Fasting specimen     Cholesterol/HDL Ratio   Date Value Ref Range Status   08/27/2015 3.0 0.0 - 5.0 Final   09/11/2014 2.8 0.0 - 5.0 Final     A1C      7.4    11/14/2019  A1C      7.0    5/14/2019  A1C      6.6    12/13/2018  A1C      6.2     6/13/2018  A1C      6.3     12/11/2017  A1C      5.7     8/8/2017  A1C      5.8    2/7/2017  A1C      6.0    10/11/2016  A1C      5.7    6/7/2016  A1C      6.3    3/4/2016  A1C      6.0    10/2/2015  A1C      6.6    4/2015  A1C     10.6   6/26/2014  A1C      6.0   2/6/2013  A1C      5.6   3/20/2012    ASSESSMENT/PLAN:    1.  SAMANTA:  Piter has hx of SAMANTA and his EJ antibody was + and C-peptide 0.8 ng/mL with glucose 144 last visit.  I started him on Lantus 10 units subcutaneous each am last visit and he has gained 5 lbs and is feeling better.  His blood sugar readings remain good at this time with an average glucose of 125 and SD 30 over the past 2 weeks.  I asked him to continue to check his blood sugar fasting each am, before lunch, before dinner and at bedtime and to remain on Lantus 10 units SQ each am and to reduce his Metformin dose to 2 tabs each am only.  I checked his UA today which showed ketones and glucose > 400.  Will arrange for pt to see our CDE again to review how to use meal time insulin which he will probably need to start soon.  If his blood sugar values are above target, he is to notify me.   He has meet with our CDE.   His UA showed ketones with glucose > 499 today.   Pt's creat is 0.74 with GFR > 90 mL/min today.  His urine microalbuminuria was negative in Jan 2019.  Feet are in good condition with normal monofilamentous exam.  TSH is normal in 11/2019.  Pt's  in Jan 2019 with HDL 50. Pt is not taking a statin at this time.  Pt had the flu vaccine this season.    2.  Return to Endocrine Clinic to see me in early Feb 2020.  He is to notify me if his blood sugar values are above target.  He will see our CDE within the next  few weeks.

## 2019-12-04 NOTE — PATIENT INSTRUCTIONS
Check your blood sugar fasting each am, before lunch and before dinner daily.  Continue Lantus 10 units each am only.  Decrease Metformin 2 pills each am only. No Metformin in the evening.  See me in early Feb 2020. I will check an A1C at that time.  Notify me if your blood sugar values increase.  Lab today.  Sheila Brewer

## 2019-12-04 NOTE — LETTER
12/4/2019     RE: Piter Conway  [address]  [facility] [id number]     Dear Colleague,    Thank you for referring your patient, Piter Conway, to the [facility] END[facility]CR[facility]OLOGY at [facility]. Please see a copy of my visit note below.    HPI  Piter Conway is a 44  year old thin male with SAMANTA here today for a follow up visit.  He was last seen in our clinic in Nov 2019. His blood sugar values and A1C were higher. I started him on Lantus 10 units subcutaneous each am and he was to remain on Metformin 1000 mg BID.  He was found to have an A1C of 10.6 % in 2014 at time of diagnosis.    His EJ was + and C-peptide 1.0.   He was treated with Metformin alone for many years until last visit when Lantus was added.  Pt made significant changes in his lifestyle and eats healthy and exercises on a regular basis.   I was concerned he was not eating enough given his BMI of 18 to avoid insulin.  Since his last visit when I added Lantus he has been eating more and reports feeling good.  His A1C was 7.4 % on 11/14/2019.  His previous A1C was 7.0 %.  We downloaded his glucose meter today and his fasting blood sugars are 132, 116, 108, 117, 128, 130, 131 6.  His blood sugars around lunch have been 71, 108, 117, 117, 91, 74, 99, 109 and 106.  His predinner blood sugar values have been 150, 96, 178, 140, 156, 176 and 128.  No hypoglycemia.  On ROS today, pt reports feeling better and has more energy. He has gained 5 lbs since his last visit.  No polyuria, polydipsia or blurred vision at this time.  Pt denies frequent headaches, n/v,SOB,cough, chest pain,abd pain, diarrhea,dysuria or hematuria.  No foot ulcers.  He denies numbness, tingling or pain in his feet or hands.    Diabetes Care  Retinopathy:none; he was seen by [facility] in May 2019. Nephropathy:none; pt's urine microalbuminuria negative in Jan 2019.  Neuropathy:none.  Foot Exam:no ulcers; normal monofilamentous exam.  Taking aspirin:no  Lipids:   in 1/2019. His HDL was 50. Not taking a statin at this time.    ROS  Please see under HPI.    Allergies  No Known Allergies    Medications  Current Outpatient Medications   Medication Sig Dispense Refill     blood glucose (NO BRAND SPECIFIED) test strip Use to test blood sugar 2 times daily or as directed. Whatever is covered by insurance 200 strip 3     blood glucose (ONETOUCH VERIO IQ) test strip Test blood sugar twice daily OR AS DIRECTED 180 each 3     blood glucose calibration (NO BRAND SPECIFIED) solution To accompany: whatever is covered by insurance test weekly 1 Bottle 3     blood glucose monitoring (NO BRAND SPECIFIED) meter device kit Use to test blood sugar 2 times daily or as directed. With whatever is covered 1 kit 0     blood glucose monitoring (ONE TOUCH DELICA) lancets Use 2 times daily. 250 each 3     blood glucose monitoring (ONETOUCH VERIO) meter device kit Use to test blood sugar 1 kit 0     DiphenhydrAMINE HCl (BENADRYL ALLERGY PO) Take 1 tablet by mouth daily       fluticasone (FLONASE) 50 MCG/ACT spray Spray 2 sprays into both nostrils daily 16 g 3     insulin glargine (LANTUS SOLOSTAR) 100 UNIT/ML pen Inject 10 units subcutaneous each am. 15 mL 3     insulin pen needle (BD MICH U/F) 32G X 4 MM miscellaneous Use daily. 250 each 3     metFORMIN (GLUCOPHAGE-XR) 500 MG 24 hr tablet Take 2 tabs each am only. 360 tablet 3     omeprazole (PRILOSEC) 20 MG DR capsule Take 20 mg po daily 90 capsule 1     ondansetron (ZOFRAN-ODT) 4 MG ODT tab Place 1-2 tabs under tongue every 8-12 hours as needed for nausea and vomiting 16 tablet 0     polyethylene glycol (MIRALAX) powder Take 17 g (1 capful) by mouth 2 times daily as needed for constipation 510 g 1     thin (NO BRAND SPECIFIED) lancets Use with lanceting device. whatever is covered by insurance  Testing 2 times daily 200 each 3     polyethylene glycol (MIRALAX) powder Take 17 g (1 capful) by mouth daily (Patient not taking: Reported on  11/14/2019) 119 g 0       Family History  family history includes Diabetes in his father; Hypertension in his father.    Social History  Smoke: none.  ETOH: none.    Past Medical History  1. SAMANTA - dx 2014.    Physical Exam  /80   Pulse 80   Wt 66.1 kg (145 lb 12.8 oz)   BMI 19.24 kg/m     Body mass index is 19.24 kg/m .    GENERAL : In no apparent distress.  SKIN: Normal.  EYES: PERRLA.    MOUTH: Moist.  NECK: No goiter.  RESP: Lungs clear to auscultation.  CARDIAC: RRR.   ABDOMEN: Nontender.   NEURO: awake, alert, responds appropriately to questions.    Moves all extremities; Gait normal.  No tremor.    EXTREMITIES: No edema.  FEET:  No ulcers; normal monofilamentous exam.    RESULTS  Creatinine   Date Value Ref Range Status   11/14/2019 0.74 0.66 - 1.25 mg/dL Final     GFR Estimate   Date Value Ref Range Status   11/14/2019 >90 >60 mL/min/[1.73_m2] Final     Comment:     Non  GFR Calc  Starting 12/18/2018, serum creatinine based estimated GFR (eGFR) will be   calculated using the Chronic Kidney Disease Epidemiology Collaboration   (CKD-EPI) equation.       Hemoglobin A1C   Date Value Ref Range Status   03/04/2019 6.9 (H) 4.1 - 5.7 % Final     Potassium   Date Value Ref Range Status   11/14/2019 4.2 3.4 - 5.3 mmol/L Final     ALT   Date Value Ref Range Status   11/14/2019 18 0 - 70 U/L Final     AST   Date Value Ref Range Status   11/14/2019 9 0 - 45 U/L Final     TSH   Date Value Ref Range Status   11/14/2019 1.47 0.40 - 4.00 mU/L Final     T4 Free   Date Value Ref Range Status   12/11/2017 1.12 0.76 - 1.46 ng/dL Final       Cholesterol   Date Value Ref Range Status   01/02/2019 160 <200 mg/dL Final   12/11/2017 103 <200 mg/dL Final     HDL Cholesterol   Date Value Ref Range Status   01/02/2019 50 >39 mg/dL Final   12/11/2017 39 (L) >39 mg/dL Final     LDL Cholesterol Calculated   Date Value Ref Range Status   01/02/2019 100 (H) <100 mg/dL Final     Comment:     Above desirable:  100-129  mg/dl  Borderline High:  130-159 mg/dL  High:             160-189 mg/dL  Very high:       >189 mg/dl     12/11/2017 55 <100 mg/dL Final     Comment:     Desirable:       <100 mg/dl     Triglycerides   Date Value Ref Range Status   01/02/2019 47 <150 mg/dL Final   12/11/2017 44 <150 mg/dL Final     Comment:     Fasting specimen     Cholesterol/HDL Ratio   Date Value Ref Range Status   08/27/2015 3.0 0.0 - 5.0 Final   09/11/2014 2.8 0.0 - 5.0 Final     A1C      7.4    11/14/2019  A1C      7.0    5/14/2019  A1C      6.6    12/13/2018  A1C      6.2     6/13/2018  A1C      6.3     12/11/2017  A1C      5.7     8/8/2017  A1C      5.8    2/7/2017  A1C      6.0    10/11/2016  A1C      5.7    6/7/2016  A1C      6.3    3/4/2016  A1C      6.0    10/2/2015  A1C      6.6    4/2015  A1C     10.6   6/26/2014  A1C      6.0   2/6/2013  A1C      5.6   3/20/2012    ASSESSMENT/PLAN:    1.  SAMANTA:  Piter has hx of SAMANTA and his EJ antibody was + and C-peptide 0.8 ng/mL with glucose 144 last visit.  I started him on Lantus 10 units subcutaneous each am last visit and he has gained 5 lbs and is feeling better.  His blood sugar readings remain good at this time with an average glucose of 125 and SD 30 over the past 2 weeks.  I asked him to continue to check his blood sugar fasting each am, before lunch, before dinner and at bedtime and to remain on Lantus 10 units SQ each am and to reduce his Metformin dose to 2 tabs each am only.  I checked his UA today which showed ketones and glucose > 400.  Will arrange for pt to see our CDE again to review how to use meal time insulin which he will probably need to start soon.  If his blood sugar values are above target, he is to notify me.   He has meet with our CDE.   His UA showed ketones with glucose > 499 today.   Pt's creat is 0.74 with GFR > 90 mL/min today.  His urine microalbuminuria was negative in Jan 2019.  Feet are in good condition with normal monofilamentous exam.  TSH is normal in  11/2019.  Pt's  in Jan 2019 with HDL 50. Pt is not taking a statin at this time.  Pt had the flu vaccine this season.    2.  Return to Endocrine Clinic to see me in early Feb 2020.  He is to notify me if his blood sugar values are above target.  He will see our CDE within the next few weeks.    Sincerely,    Sheila Brewer PA-C

## 2019-12-11 DIAGNOSIS — E13.9 LADA (LATENT AUTOIMMUNE DIABETES MELLITUS IN ADULTS) (H): ICD-10-CM

## 2019-12-12 ENCOUNTER — ALLIED HEALTH/NURSE VISIT (OUTPATIENT)
Dept: EDUCATION SERVICES | Facility: CLINIC | Age: 44
End: 2019-12-12
Payer: COMMERCIAL

## 2019-12-12 VITALS — BODY MASS INDEX: 19.26 KG/M2 | WEIGHT: 146 LBS

## 2019-12-12 DIAGNOSIS — E13.9 LADA (LATENT AUTOIMMUNE DIABETES MELLITUS IN ADULTS) (H): Primary | ICD-10-CM

## 2019-12-13 NOTE — PATIENT INSTRUCTIONS
1.  Scan your blood sugar before each meal and at bedtime.  You blood sugar goals are: Before meals:  mg/dl 2 hours after a meal: <180 mg/dl    2.  Start Novolog 1 unit per 15 grams of carb plus the following sliding scale:    150-200 = 1 unit  201-250 = 2 units  251-300 = 3 units  301-350 = 4 units  351-400 = 5 units  401-450 = 6 units  451-500 = 7 units    3. Carry glucose tablets with you and if you get symptoms of low blood sugar test and if you are below 70 take 3-4 glucose tablets and recheck blood sugar in 15 minutes.    4.  Return: 1/2 at 5:30 pm.    Keshia Zuluaga RN,[place] [address]  Phone: [phone] or [phone]  [email or web address]

## 2019-12-13 NOTE — PROGRESS NOTES
"Diabetes Self-Management Education & Support    Diabetes Education Self Management & Training    SUBJECTIVE/OBJECTIVE:  Presents for: Individual review  Accompanied by: Self  Diabetes education in the past 24mo: Yes  Focus of Visit: Taking Medication  Diabetes type: Type 1  Disease course: Getting harder to manage  Diabetes management related comments/concerns: to start mealtime insulin  Transportation concerns: No  Other concerns:: English as a second language  Cultural Influences/Ethnic Background:  American      Diabetes Symptoms & Complications   Patient Problem List and Family Medical History reviewed for relevant medical history, current medical status, and diabetes risk factors.    Vitals:  Wt 66.2 kg (146 lb)   BMI 19.26 kg/m    Estimated body mass index is 19.26 kg/m  as calculated from the following:    Height as of 11/14/19: 1.854 m (6' 1\").    Weight as of this encounter: 66.2 kg (146 lb).   Last 3 BP:   BP Readings from Last 3 Encounters:   12/04/19 120/80   11/14/19 102/71   05/14/19 114/78       History   Smoking Status     Never Smoker   Smokeless Tobacco     Never Used       Labs:  Lab Results   Component Value Date    A1C 6.9 03/04/2019     Lab Results   Component Value Date     11/14/2019     Lab Results   Component Value Date     01/02/2019     HDL Cholesterol   Date Value Ref Range Status   01/02/2019 50 >39 mg/dL Final   ]  GFR Estimate   Date Value Ref Range Status   11/14/2019 >90 >60 mL/min/[1.73_m2] Final     Comment:     Non  GFR Calc  Starting 12/18/2018, serum creatinine based estimated GFR (eGFR) will be   calculated using the Chronic Kidney Disease Epidemiology Collaboration   (CKD-EPI) equation.       GFR Estimate If Black   Date Value Ref Range Status   11/14/2019 >90 >60 mL/min/[1.73_m2] Final     Comment:      GFR Calc  Starting 12/18/2018, serum creatinine based estimated GFR (eGFR) will be   calculated using the Chronic Kidney " Disease Epidemiology Collaboration   (CKD-EPI) equation.       Lab Results   Component Value Date    CR 0.74 2019     No results found for: MICROALBUMIN    Healthy Eating  Breakfast: 2 peanut butter and jelly sandwich, 8 oz milk or orange juice or 2 eggs with 4 slices of toast(8am)  Lunch: rice with chicken, spaghetti with goat meat and potatoes and red sauce,1 banana, smoothie (apple orange and pear)(3p)  Dinner: kidney beans with carrots and onions and tomato with 4 slices of bread, 1 cup sweet tea with milk, 1/2 glass milk or 2 eggs and 4 slices of bread(8p)  Snack: 1 cup coffee with milk, muffin sometimes a small cake around 9pm(11-12p)  Beverages: Water, Coffee, Tea, Milk, Juice    Monitoring  [id number]  Lunch:   Dinner:     Taking Medications  Diabetes Medication(s)     Biguanides       metFORMIN (GLUCOPHAGE-XR) 500 MG 24 hr tablet    Take 2 tabs each am only.    Insulin       insulin aspart (NOVOLOG FLEXPEN) 100 UNIT/ML pen    Use 1 unit per 15 grams of carb plus 1 unit per 50 above 150 (Max dose 50 units)     insulin glargine (LANTUS SOLOSTAR) 100 UNIT/ML pen    Inject 14 units subcutaneous each am.        Healthy Coping     Patient Activation Measure Survey Score:  No flowsheet data found.    ASSESSMENT:  SAMANTA, blood sugars rising    Patient's most recent   Lab Results   Component Value Date    A1C 6.9 2019    is not meeting goal of <7.0    INTERVENTION:   Diabetes knowledge and skills assessment:     Patient is knowledgeable in diabetes management concepts related to: Being Active, Monitoring, Taking Medication and Problem Solving    Patient needs further education on the following diabetes management concepts: Healthy Eating, Reducing Risks    Based on learning assessment above, most appropriate setting for further diabetes education would be: Individual setting.    Education provided today on:  Piter eats a lot of the same foods everyday in order to keep his blood sugar  controlled.  We went through his day and figured out carbs on all of his foods.  He has seen an RD before and had some insight into this process.  His meals are quite high in carb.    Piter seemed to understand the teaching on using an ICR and a correction factor.  He was able to quickly figure the carb and figure out the insulin dose.  He was told to just start by covering meals, not snacks. He was given the Vietnamese carb counting book.     Teaching was done on insulin action times, the importance of lining up the food and the carb and bolusing before the meal.  The hypoglycemia protocol was reviewed as well.  He will let us know if he has any low blood sugar.    A Claudia sensor was placed in his left arm without difficulty.  The lot number is [id number]  And the serial number [id number].  This is a sample kit and we will order it if he likes using it.    Opportunities for ongoing education and support in diabetes-self management were discussed.    Pt verbalized understanding of concepts discussed and recommendations provided today.       PLAN:  See Patient Instructions for co-developed, patient-stated behavior change goals.  AVS printed and provided to patient today. See Follow-Up section for recommended follow-up.    Time Spent: 60 minutes  Encounter Type: Individual    Any diabetes medication dose changes were made via the CDE Protocol and Collaborative Practice Agreement with the patient's referring provider. A copy of this encounter was shared with the provider.

## 2020-01-03 ENCOUNTER — ALLIED HEALTH/NURSE VISIT (OUTPATIENT)
Dept: EDUCATION SERVICES | Facility: CLINIC | Age: 45
End: 2020-01-03
Payer: COMMERCIAL

## 2020-01-03 VITALS — BODY MASS INDEX: 19.66 KG/M2 | WEIGHT: 149 LBS

## 2020-01-03 DIAGNOSIS — E13.9 LADA (LATENT AUTOIMMUNE DIABETES MELLITUS IN ADULTS) (H): Primary | ICD-10-CM

## 2020-01-03 RX ORDER — FLASH GLUCOSE SENSOR
KIT MISCELLANEOUS
Qty: 3 EACH | Refills: 11 | Status: SHIPPED | OUTPATIENT
Start: 2020-01-03 | End: 2020-01-08

## 2020-01-03 NOTE — PATIENT INSTRUCTIONS
1.  Keep using your current insulin doses.      2.  Use your sensor but realize that sometimes it is not as accurate.  Double check your lows with your meter.    3.  If you are interested in [place], let me know.    4.  Follow up with Sheila Brewer next month.    Keshia Zuluaga RN,CDE  [address] [place] [address]  Phone: [phone]  [email or web address]

## 2020-01-03 NOTE — PROGRESS NOTES
"Diabetes Self-Management Education & Support    Diabetes Education Self Management & Training    SUBJECTIVE/OBJECTIVE:     Cultural Influences/Ethnic Background:  American    Diabetes Symptoms & Complications  Patient Problem List and Family Medical History reviewed for relevant medical history, current medical status, and diabetes risk factors.    Vitals:  Wt 67.6 kg (149 lb)   BMI 19.66 kg/m    Estimated body mass index is 19.66 kg/m  as calculated from the following:    Height as of 11/14/19: 1.854 m (6' 1\").    Weight as of this encounter: 67.6 kg (149 lb).   Last 3 BP:   BP Readings from Last 3 Encounters:   12/04/19 120/80   11/14/19 102/71   05/14/19 114/78       History   Smoking Status     Never Smoker   Smokeless Tobacco     Never Used       Labs:  Lab Results   Component Value Date    A1C 6.9 03/04/2019     Lab Results   Component Value Date     11/14/2019     Lab Results   Component Value Date     01/02/2019     HDL Cholesterol   Date Value Ref Range Status   01/02/2019 50 >39 mg/dL Final   ]  GFR Estimate   Date Value Ref Range Status   11/14/2019 >90 >60 mL/min/[1.73_m2] Final     Comment:     Non  GFR Calc  Starting 12/18/2018, serum creatinine based estimated GFR (eGFR) will be   calculated using the Chronic Kidney Disease Epidemiology Collaboration   (CKD-EPI) equation.       GFR Estimate If Black   Date Value Ref Range Status   11/14/2019 >90 >60 mL/min/[1.73_m2] Final     Comment:      GFR Calc  Starting 12/18/2018, serum creatinine based estimated GFR (eGFR) will be   calculated using the Chronic Kidney Disease Epidemiology Collaboration   (CKD-EPI) equation.       Lab Results   Component Value Date    CR 0.74 11/14/2019     No results found for: MICROALBUMIN    Healthy Eating   He is happy with carb counting.  He has been able to eat more!  His weight is up 3 pounds today.  We went through some of his typical meals and he was calculating his insulin " correctly.    Monitoring         Taking Medications  Diabetes Medication(s)     Insulin       insulin aspart (NOVOLOG FLEXPEN) 100 UNIT/ML pen    Use 1 unit per 15 grams of carb plus 1 unit per 50 above 150 (Max dose 50 units)     insulin glargine (LANTUS SOLOSTAR) 100 UNIT/ML pen    Inject 14 units subcutaneous each am.        Problem Solving  His lows happen when he is dosing his insulin after eating.  He figured this out himself and has corrected that.     Healthy Coping     Patient Activation Measure Survey Score:  No flowsheet data found.    ASSESSMENT:  SAMANTA, a1c above target    Patient's most recent   Lab Results   Component Value Date    A1C 6.9 03/04/2019    is not meeting goal of <7.0    INTERVENTION:     Education provided today on:  We uploaded the Claudia and Piter is happy with it.  An order was placed for him.  He is doing well with carb counting and calculating insulin doses.  He is very happy that he can eat more.    We reviewed the procedure for inserting the Claudia and he feels he can do this at home.  We discussed the [place] and this may be something he wants in the future.      Opportunities for ongoing education and support in diabetes-self management were discussed.    Pt verbalized understanding of concepts discussed and recommendations provided today.       PLAN:  See Patient Instructions for co-developed, patient-stated behavior change goals.  AVS printed and provided to patient today. See Follow-Up section for recommended follow-up.    Time Spent: 60 minutes  Encounter Type: Individual    Any diabetes medication dose changes were made via the CDE Protocol and Collaborative Practice Agreement with the patient's referring provider. A copy of this encounter was shared with the provider.

## 2020-01-07 DIAGNOSIS — E13.9 LADA (LATENT AUTOIMMUNE DIABETES MELLITUS IN ADULTS) (H): ICD-10-CM

## 2020-01-08 RX ORDER — FLASH GLUCOSE SENSOR
KIT MISCELLANEOUS
Qty: 3 EACH | Refills: 11 | Status: SHIPPED | OUTPATIENT
Start: 2020-01-08 | End: 2020-02-27

## 2020-01-15 ENCOUNTER — TELEPHONE (OUTPATIENT)
Dept: ENDOCRINOLOGY | Facility: CLINIC | Age: 45
End: 2020-01-15

## 2020-01-15 NOTE — TELEPHONE ENCOUNTER
Prior Authorization Retail Medication Request    Medication/Dose: Freestyle Claudia Sensor  ICD code (if different than what is on RX):     Previously Tried and Failed:    Rationale:      [facility]  Insurance ID:  [id number]      Pharmacy Information (if different than what is on RX)  Name:  [facility]   Phone:  [phone]

## 2020-01-17 NOTE — TELEPHONE ENCOUNTER
Prior Authorization Approval    Authorization Effective Date: 12/18/2019  Authorization Expiration Date: 1/16/2021  Medication: continuous blood glucose monitoring (FREESTYLE NAVEED) sensor - APPROVED  Reference #: [id number]   Insurance Company: [employer] - Phone [phone] Fax [phone]  Which Pharmacy is filling the prescription (Not needed for infusion/clinic administered): [facility] [place], [place] - [address]  Pharmacy Notified: Yes  Patient Notified: Yes    Mary Proctor PA Team

## 2020-01-17 NOTE — TELEPHONE ENCOUNTER
PA Initiation    Medication: continuous blood glucose monitoring (FREESTYLE NAVEED) sensor - INITIATED  Insurance Company: [employer] - Phone [phone] Fax [phone]  Pharmacy Filling the Rx: [place], [place] - [address]  Filling Pharmacy Phone: [phone]  Start Date: 1/17/2020

## 2020-02-27 ENCOUNTER — OFFICE VISIT (OUTPATIENT)
Dept: ENDOCRINOLOGY | Facility: CLINIC | Age: 45
End: 2020-02-27
Payer: COMMERCIAL

## 2020-02-27 VITALS
HEART RATE: 75 BPM | SYSTOLIC BLOOD PRESSURE: 128 MMHG | BODY MASS INDEX: 20.94 KG/M2 | HEIGHT: 73 IN | WEIGHT: 158 LBS | DIASTOLIC BLOOD PRESSURE: 83 MMHG

## 2020-02-27 DIAGNOSIS — E10.65 TYPE 1 DIABETES MELLITUS WITH HYPERGLYCEMIA (H): ICD-10-CM

## 2020-02-27 DIAGNOSIS — E10.65 TYPE 1 DIABETES MELLITUS WITH HYPERGLYCEMIA (H): Primary | ICD-10-CM

## 2020-02-27 LAB
ALBUMIN UR-MCNC: NEGATIVE MG/DL
ALT SERPL W P-5'-P-CCNC: 50 U/L (ref 0–70)
ANION GAP SERPL CALCULATED.3IONS-SCNC: 4 MMOL/L (ref 3–14)
APPEARANCE UR: CLEAR
AST SERPL W P-5'-P-CCNC: 31 U/L (ref 0–45)
BILIRUB UR QL STRIP: NEGATIVE
CHLORIDE SERPL-SCNC: 105 MMOL/L (ref 94–109)
CHOLEST SERPL-MCNC: 146 MG/DL
CO2 SERPL-SCNC: 30 MMOL/L (ref 20–32)
COLOR UR AUTO: NORMAL
CREAT SERPL-MCNC: 0.79 MG/DL (ref 0.66–1.25)
CREAT UR-MCNC: 42 MG/DL
GFR SERPL CREATININE-BSD FRML MDRD: >90 ML/MIN/{1.73_M2}
GLUCOSE UR STRIP-MCNC: NEGATIVE MG/DL
HBA1C MFR BLD: 6.6 % (ref 4.3–6)
HDLC SERPL-MCNC: 56 MG/DL
HGB UR QL STRIP: NEGATIVE
KETONES UR STRIP-MCNC: NEGATIVE MG/DL
LDLC SERPL CALC-MCNC: 82 MG/DL
LEUKOCYTE ESTERASE UR QL STRIP: NEGATIVE
MICROALBUMIN UR-MCNC: <5 MG/L
MICROALBUMIN/CREAT UR: NORMAL MG/G CR (ref 0–17)
NITRATE UR QL: NEGATIVE
NONHDLC SERPL-MCNC: 90 MG/DL
PH UR STRIP: 7 PH (ref 5–7)
POTASSIUM SERPL-SCNC: 4.1 MMOL/L (ref 3.4–5.3)
RBC #/AREA URNS AUTO: 1 /HPF (ref 0–2)
SODIUM SERPL-SCNC: 139 MMOL/L (ref 133–144)
SOURCE: NORMAL
SP GR UR STRIP: 1.01 (ref 1–1.03)
TRIGL SERPL-MCNC: 38 MG/DL
TSH SERPL DL<=0.005 MIU/L-ACNC: 1.52 MU/L (ref 0.4–4)
UROBILINOGEN UR STRIP-MCNC: 0 MG/DL (ref 0–2)
WBC #/AREA URNS AUTO: <1 /HPF (ref 0–5)

## 2020-02-27 RX ORDER — FLASH GLUCOSE SENSOR
1 KIT MISCELLANEOUS
Qty: 8 EACH | Refills: 3 | Status: SHIPPED | OUTPATIENT
Start: 2020-02-27 | End: 2021-01-24

## 2020-02-27 ASSESSMENT — MIFFLIN-ST. JEOR: SCORE: 1655.56

## 2020-02-27 NOTE — LETTER
2/27/2020       RE: Piter Conway  [address]  [facility] [id number]     Dear Colleague,    Thank you for referring your patient, Piter Conway, to the [facility] ENDOCRINOLOGY at [facility]. Please see a copy of my visit note below.    HPI  Piter Conway is a 45 year old thin male with type 1 diabetes mellitus here today for a follow up visit.  He was last seen in our clinic in Dec 2019. I started him on insulin in Nov 2019 and he is doing much better and has gained weight.  His EJ was + with C-peptide 1.0 .  For his diabetes, he is currently taking Lantus 14 units subcutaneous each am and Novolog 1 unit/15 gms CHO with meals with correction insulin.  His A1C is 6.6 % today.  His previous A1C was 7.4 %.  His A1C was 10.6 % in 2014 time of diabetes diagnosis.  We downloaded his Freestyle Claudia device today and his average glucose was 130 with SD 3.  His blood sugar values are good.  He has had a few low blood sugars at different times during the day approx 1-2 times per week.   Piter is able to recognize and self treat his hypoglycemia.  On ROS today, pt reports feeling better, has more energy and gained some weight.  No polyuria, polydipsia or blurred vision at this time.  Pt denies frequent headaches, n/v,SOB,cough, chest pain,abd pain, diarrhea,dysuria or hematuria.  No foot ulcers.  He denies numbness, tingling or pain in his feet or hands.    Diabetes Care  Retinopathy:none; he was seen by Oph in May 2019.   Nephropathy:none; pt's urine microalbuminuria negative today.  Neuropathy:none.  Foot Exam:no ulcers; normal monofilamentous exam.  Taking aspirin:no  Lipids: LDL 82 with HDL 56 today.    ROS  Please see under HPI.    Allergies  No Known Allergies    Medications  Current Outpatient Medications   Medication Sig Dispense Refill     blood glucose (NO BRAND SPECIFIED) test strip Use to test blood sugar 2 times daily or as directed. Whatever is covered by insurance 200 strip  "3     blood glucose (ONETOUCH VERIO IQ) test strip Test blood sugar twice daily OR AS DIRECTED 180 each 3     blood glucose calibration (NO BRAND SPECIFIED) solution To accompany: whatever is covered by insurance test weekly 1 Bottle 3     blood glucose monitoring (ONE TOUCH DELICA) lancets Use 2 times daily. 250 each 3     blood glucose monitoring (ONETOUCH VERIO) meter device kit Use to test blood sugar 1 kit 0     Continuous Blood Gluc Sensor (FREESTYLE NAVEED 14 DAY SENSOR) MISC 1 applicator every 14 days 8 each 3     DiphenhydrAMINE HCl (BENADRYL ALLERGY PO) Take 1 tablet by mouth daily       fluticasone (FLONASE) 50 MCG/ACT spray Spray 2 sprays into both nostrils daily 16 g 3     insulin aspart (NOVOLOG FLEXPEN) 100 UNIT/ML pen Use 1 unit per 15 grams of carb plus 1 unit per 50 above 150 (Max dose 50 units) 15 mL 3     insulin glargine (LANTUS SOLOSTAR) 100 UNIT/ML pen Inject 14 units subcutaneous each am. 15 mL 3     insulin pen needle (BD MICH U/F) 32G X 4 MM miscellaneous Use daily. 250 each 3     omeprazole (PRILOSEC) 20 MG DR capsule Take 20 mg po daily 90 capsule 1     ondansetron (ZOFRAN-ODT) 4 MG ODT tab Place 1-2 tabs under tongue every 8-12 hours as needed for nausea and vomiting 16 tablet 0     polyethylene glycol (MIRALAX) powder Take 17 g (1 capful) by mouth daily 119 g 0     polyethylene glycol (MIRALAX) powder Take 17 g (1 capful) by mouth 2 times daily as needed for constipation 510 g 1     thin (NO BRAND SPECIFIED) lancets Use with lanceting device. whatever is covered by insurance  Testing 2 times daily 200 each 3       Family History  family history includes Diabetes in his father; Hypertension in his father.  He also has a younger brother with type 1 diabetes using an insulin pump.    Social History  Smoke: none.  ETOH: none.    Past Medical History  1. SAMANTA/DM 1 - dx 2014.    Physical Exam  /83   Pulse 75   Ht 1.854 m (6' 1\")   Wt 71.7 kg (158 lb)   BMI 20.85 kg/m     Body mass " index is 20.85 kg/m .     He has gained 13 lbs since I last saw him in Dec 2019.     GENERAL : In no apparent distress.  SKIN: Normal.  EYES: PERRLA.    MOUTH: Moist.  NECK: No goiter.  RESP: Lungs clear to auscultation.  CARDIAC: RRR.   ABDOMEN: Nontender.   NEURO: awake, alert, responds appropriately to questions.    Moves all extremities; Gait normal.  No tremor.    EXTREMITIES: No edema.  FEET:  No ulcers; normal monofilamentous exam.    RESULTS  Creatinine   Date Value Ref Range Status   02/27/2020 0.79 0.66 - 1.25 mg/dL Final     GFR Estimate   Date Value Ref Range Status   02/27/2020 >90 >60 mL/min/[1.73_m2] Final     Comment:     Non  GFR Calc  Starting 12/18/2018, serum creatinine based estimated GFR (eGFR) will be   calculated using the Chronic Kidney Disease Epidemiology Collaboration   (CKD-EPI) equation.       Hemoglobin A1C   Date Value Ref Range Status   03/04/2019 6.9 (H) 4.1 - 5.7 % Final     Potassium   Date Value Ref Range Status   02/27/2020 4.1 3.4 - 5.3 mmol/L Final     ALT   Date Value Ref Range Status   02/27/2020 50 0 - 70 U/L Final     AST   Date Value Ref Range Status   02/27/2020 31 0 - 45 U/L Final     TSH   Date Value Ref Range Status   02/27/2020 1.52 0.40 - 4.00 mU/L Final     T4 Free   Date Value Ref Range Status   12/11/2017 1.12 0.76 - 1.46 ng/dL Final       Cholesterol   Date Value Ref Range Status   02/27/2020 146 <200 mg/dL Final   01/02/2019 160 <200 mg/dL Final     HDL Cholesterol   Date Value Ref Range Status   02/27/2020 56 >39 mg/dL Final   01/02/2019 50 >39 mg/dL Final     LDL Cholesterol Calculated   Date Value Ref Range Status   02/27/2020 82 <100 mg/dL Final     Comment:     Desirable:       <100 mg/dl   01/02/2019 100 (H) <100 mg/dL Final     Comment:     Above desirable:  100-129 mg/dl  Borderline High:  130-159 mg/dL  High:             160-189 mg/dL  Very high:       >189 mg/dl       Triglycerides   Date Value Ref Range Status   02/27/2020 38 <150  mg/dL Final   01/02/2019 47 <150 mg/dL Final     Cholesterol/HDL Ratio   Date Value Ref Range Status   08/27/2015 3.0 0.0 - 5.0 Final   09/11/2014 2.8 0.0 - 5.0 Final     A1C      6.6     2/27/2020  A1C      7.4    11/14/2019  A1C      7.0    5/14/2019  A1C      6.6    12/13/2018  A1C      6.2     6/13/2018  A1C      6.3     12/11/2017  A1C      5.7     8/8/2017  A1C      5.8    2/7/2017  A1C      6.0    10/11/2016  A1C      5.7    6/7/2016  A1C      6.3    3/4/2016  A1C      6.0    10/2/2015  A1C      6.6    4/2015  A1C     10.6   6/26/2014  A1C      6.0   2/6/2013  A1C      5.6   3/20/2012    ASSESSMENT/PLAN:    1.  TYPE 1 DIABETES MELLITUS:  Piter had a + EJ antibody and C-peptide 0.8 ng/mL with glucose 144 in Nov 2019.  I started him on insulin in Nov 2019 and he is doing much better and has gained weight.  In view of 2 low blood sugars per week, I asked him to reduce his Lantus 13 units subcutaneous each am.  No change in Novolog I/C ratio.  Pt's urine microalbuminuria is negative today with a normal creat/GFR.  His TSH is normal today.  Pt's LDL is good at 82 with HDL 56 today.  Pt had the flu vaccine this season.    2.  Return to Endocrine Clinic to see me in 4 months.          Again, thank you for allowing me to participate in the care of your patient.      Sincerely,    Sheila Brewer PA-C

## 2020-02-27 NOTE — PROGRESS NOTES
HPI  Piter Conway is a 45 year old thin male with type 1 diabetes mellitus here today for a follow up visit.  He was last seen in our clinic in Dec 2019. I started him on insulin in Nov 2019 and he is doing much better and has gained weight.  His EJ was + with C-peptide 1.0 .  For his diabetes, he is currently taking Lantus 14 units subcutaneous each am and Novolog 1 unit/15 gms CHO with meals with correction insulin.  His A1C is 6.6 % today.  His previous A1C was 7.4 %.  His A1C was 10.6 % in 2014 time of diabetes diagnosis.  We downloaded his Freestyle Claudia device today and his average glucose was 130 with SD 3.  His blood sugar values are good.  He has had a few low blood sugars at different times during the day approx 1-2 times per week.   Piter is able to recognize and self treat his hypoglycemia.  On ROS today, pt reports feeling better, has more energy and gained some weight.  No polyuria, polydipsia or blurred vision at this time.  Pt denies frequent headaches, n/v,SOB,cough, chest pain,abd pain, diarrhea,dysuria or hematuria.  No foot ulcers.  He denies numbness, tingling or pain in his feet or hands.    Diabetes Care  Retinopathy:none; he was seen by Oph in May 2019.   Nephropathy:none; pt's urine microalbuminuria negative today.  Neuropathy:none.  Foot Exam:no ulcers; normal monofilamentous exam.  Taking aspirin:no  Lipids: LDL 82 with HDL 56 today.    ROS  Please see under HPI.    Allergies  No Known Allergies    Medications  Current Outpatient Medications   Medication Sig Dispense Refill     blood glucose (NO BRAND SPECIFIED) test strip Use to test blood sugar 2 times daily or as directed. Whatever is covered by insurance 200 strip 3     blood glucose (ONETOUCH VERIO IQ) test strip Test blood sugar twice daily OR AS DIRECTED 180 each 3     blood glucose calibration (NO BRAND SPECIFIED) solution To accompany: whatever is covered by insurance test weekly 1 Bottle 3     blood glucose monitoring (ONE TOUCH  "DELICA) lancets Use 2 times daily. 250 each 3     blood glucose monitoring (ONETOUCH VERIO) meter device kit Use to test blood sugar 1 kit 0     Continuous Blood Gluc Sensor (FREESTYLE NAVEED 14 DAY SENSOR) MISC 1 applicator every 14 days 8 each 3     DiphenhydrAMINE HCl (BENADRYL ALLERGY PO) Take 1 tablet by mouth daily       fluticasone (FLONASE) 50 MCG/ACT spray Spray 2 sprays into both nostrils daily 16 g 3     insulin aspart (NOVOLOG FLEXPEN) 100 UNIT/ML pen Use 1 unit per 15 grams of carb plus 1 unit per 50 above 150 (Max dose 50 units) 15 mL 3     insulin glargine (LANTUS SOLOSTAR) 100 UNIT/ML pen Inject 14 units subcutaneous each am. 15 mL 3     insulin pen needle (BD MICH U/F) 32G X 4 MM miscellaneous Use daily. 250 each 3     omeprazole (PRILOSEC) 20 MG DR capsule Take 20 mg po daily 90 capsule 1     ondansetron (ZOFRAN-ODT) 4 MG ODT tab Place 1-2 tabs under tongue every 8-12 hours as needed for nausea and vomiting 16 tablet 0     polyethylene glycol (MIRALAX) powder Take 17 g (1 capful) by mouth daily 119 g 0     polyethylene glycol (MIRALAX) powder Take 17 g (1 capful) by mouth 2 times daily as needed for constipation 510 g 1     thin (NO BRAND SPECIFIED) lancets Use with lanceting device. whatever is covered by insurance  Testing 2 times daily 200 each 3       Family History  family history includes Diabetes in his father; Hypertension in his father.  He also has a younger brother with type 1 diabetes using an insulin pump.    Social History  Smoke: none.  ETOH: none.    Past Medical History  1. SAMANTA/DM 1 - dx 2014.    Physical Exam  /83   Pulse 75   Ht 1.854 m (6' 1\")   Wt 71.7 kg (158 lb)   BMI 20.85 kg/m    Body mass index is 20.85 kg/m .     He has gained 13 lbs since I last saw him in Dec 2019.     GENERAL : In no apparent distress.  SKIN: Normal.  EYES: PERRLA.    MOUTH: Moist.  NECK: No goiter.  RESP: Lungs clear to auscultation.  CARDIAC: RRR.   ABDOMEN: Nontender.   NEURO: awake, " alert, responds appropriately to questions.    Moves all extremities; Gait normal.  No tremor.    EXTREMITIES: No edema.  FEET:  No ulcers; normal monofilamentous exam.    RESULTS  Creatinine   Date Value Ref Range Status   02/27/2020 0.79 0.66 - 1.25 mg/dL Final     GFR Estimate   Date Value Ref Range Status   02/27/2020 >90 >60 mL/min/[1.73_m2] Final     Comment:     Non  GFR Calc  Starting 12/18/2018, serum creatinine based estimated GFR (eGFR) will be   calculated using the Chronic Kidney Disease Epidemiology Collaboration   (CKD-EPI) equation.       Hemoglobin A1C   Date Value Ref Range Status   03/04/2019 6.9 (H) 4.1 - 5.7 % Final     Potassium   Date Value Ref Range Status   02/27/2020 4.1 3.4 - 5.3 mmol/L Final     ALT   Date Value Ref Range Status   02/27/2020 50 0 - 70 U/L Final     AST   Date Value Ref Range Status   02/27/2020 31 0 - 45 U/L Final     TSH   Date Value Ref Range Status   02/27/2020 1.52 0.40 - 4.00 mU/L Final     T4 Free   Date Value Ref Range Status   12/11/2017 1.12 0.76 - 1.46 ng/dL Final       Cholesterol   Date Value Ref Range Status   02/27/2020 146 <200 mg/dL Final   01/02/2019 160 <200 mg/dL Final     HDL Cholesterol   Date Value Ref Range Status   02/27/2020 56 >39 mg/dL Final   01/02/2019 50 >39 mg/dL Final     LDL Cholesterol Calculated   Date Value Ref Range Status   02/27/2020 82 <100 mg/dL Final     Comment:     Desirable:       <100 mg/dl   01/02/2019 100 (H) <100 mg/dL Final     Comment:     Above desirable:  100-129 mg/dl  Borderline High:  130-159 mg/dL  High:             160-189 mg/dL  Very high:       >189 mg/dl       Triglycerides   Date Value Ref Range Status   02/27/2020 38 <150 mg/dL Final   01/02/2019 47 <150 mg/dL Final     Cholesterol/HDL Ratio   Date Value Ref Range Status   08/27/2015 3.0 0.0 - 5.0 Final   09/11/2014 2.8 0.0 - 5.0 Final     A1C      6.6     2/27/2020  A1C      7.4    11/14/2019  A1C      7.0    5/14/2019  A1C      6.6     12/13/2018  A1C      6.2     6/13/2018  A1C      6.3     12/11/2017  A1C      5.7     8/8/2017  A1C      5.8    2/7/2017  A1C      6.0    10/11/2016  A1C      5.7    6/7/2016  A1C      6.3    3/4/2016  A1C      6.0    10/2/2015  A1C      6.6    4/2015  A1C     10.6   6/26/2014  A1C      6.0   2/6/2013  A1C      5.6   3/20/2012    ASSESSMENT/PLAN:    1.  TYPE 1 DIABETES MELLITUS:  Piter had a + EJ antibody and C-peptide 0.8 ng/mL with glucose 144 in Nov 2019.  I started him on insulin in Nov 2019 and he is doing much better and has gained weight.  In view of 2 low blood sugars per week, I asked him to reduce his Lantus 13 units subcutaneous each am.  No change in Novolog I/C ratio.  Pt's urine microalbuminuria is negative today with a normal creat/GFR.  His TSH is normal today.  Pt's LDL is good at 82 with HDL 56 today.  Pt had the flu vaccine this season.    2.  Return to Endocrine Clinic to see me in 4 months.

## 2020-05-31 DIAGNOSIS — K21.9 GASTROESOPHAGEAL REFLUX DISEASE WITHOUT ESOPHAGITIS: ICD-10-CM

## 2020-06-01 NOTE — TELEPHONE ENCOUNTER
Patient last saw Dr. Banerjee 11/1/2017 for GERD.   Med last filled 3/4/19 #90 + 1 refill by Dr. Govea.     I called patient and LVM - if he is having symptoms and requesting refill of this medication, please call back and schedule an appointment with Dr. Banerjee.     Routing refill request to provider for review/approval because: do you want to refuse this until patient schedules?    Mar Willingham RN  06/01/20  1:31 PM

## 2020-06-01 NOTE — PROGRESS NOTES
"Family Medicine Telephone Visit Note  Consent and telephone details are below    Subjective     Piter Conway is a 45 year old male who presents to clinic today for the following health issues:    Chief Complaint   Patient presents with     Abdominal Pain     heartburn last week, recheck for omeprazole     GERD  Piter is a 44 yo male with hx of type 1 diabetes which is usually well controlled. He has previous history of acid reflux, last treated in 2019. No hx of ulcers. He denies drinking caffeine or alcohol. Nonsmoker. He is not using NSAIDs, uses tylenol. No dysphagia.     Over the past week, with eating a lot of lemon, he notes onset of burning and acid in his throat. He denies dysphagia. He has some pain after eating. No nocturnal symptoms.          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)     Past Surgical History:   Procedure Laterality Date     NO HISTORY OF SURGERY         Social History     Tobacco Use     Smoking status: Never Smoker     Smokeless tobacco: Never Used   Substance Use Topics     Alcohol use: No     Family History   Problem Relation Age of Onset     Hypertension Father      Diabetes Father      Glaucoma No family hx of      Macular Degeneration No family hx of              Reviewed and updated as needed this visit by Provider         Review of Systems   Constitutional, HEENT, cardiovascular, pulmonary, gi and gu systems are negative, except as otherwise noted.      Objective    Ht 1.854 m (6' 1\")   Wt 72.1 kg (159 lb)   BMI 20.98 kg/m    Body mass index is 20.98 kg/m .  General:alert and no distress      Assessment/Plan:  (K21.9) Gastroesophageal reflux disease without esophagitis  Comment: onset one week ago, triggered by acidic food intake  Plan: omeprazole (PRILOSEC) 20 MG DR capsule        Use bid x one week then once each morning for a month. If symptoms " "refractory, worsening, then contact MD to consider EGD. Pt agrees with plan.   Avoid triggers    Rosamaria Banerjee MD    TELEPHONE VISIT DETAILS and Consent      Piter Conway is a 45 year old male who is being evaluated via a billable telephone visit.      The patient has been notified of following:     \"This telephone visit will be conducted via a call between you and your physician/provider. We have found that certain health care needs can be provided without the need for a physical exam.  This service lets us provide the care you need with a short phone conversation.  If a prescription is necessary we can send it directly to your pharmacy.  If lab work is needed we can place an order for that and you can then stop by our lab to have the test done at a later time.    Telephone visits are billed at different rates depending on your insurance coverage. During this emergency period, for some insurers they may be billed the same as an in-person visit.  Please reach out to your insurance provider with any questions.    If during the course of the call the physician/provider feels a telephone visit is not appropriate, you will not be charged for this service.\"    Patient has given verbal consent for Telephone visit?  Yes    How would you like to obtain your AVS? Fashismhart    After Visit Information:  Patient chose to view AVS via [employer]      Start time: 11:29 AM    End time: 11:38 AM    Total : 9 minutes              "

## 2020-06-02 ENCOUNTER — VIRTUAL VISIT (OUTPATIENT)
Dept: FAMILY MEDICINE | Facility: CLINIC | Age: 45
End: 2020-06-02
Payer: COMMERCIAL

## 2020-06-02 VITALS — WEIGHT: 159 LBS | HEIGHT: 73 IN | BODY MASS INDEX: 21.07 KG/M2

## 2020-06-02 DIAGNOSIS — K21.9 GASTROESOPHAGEAL REFLUX DISEASE WITHOUT ESOPHAGITIS: ICD-10-CM

## 2020-06-02 ASSESSMENT — MIFFLIN-ST. JEOR: SCORE: 1660.1

## 2020-06-02 NOTE — PATIENT INSTRUCTIONS
Omeprazole 20mg dose  Take one tablet twice daily for the next week , on an empty stomach  Avoid spicy or acidic foods.    After one week, then take one tablet daily for a month, then try stopping.  If you have persistent symptoms or the medication is not helping at all, contact me as you may be developing an ulcer and need to see the gastroenterologist.    Be safe, be well.    Rosamaria Banerjee MD  Internal Medicine/Pediatrics

## 2020-07-21 NOTE — PROGRESS NOTES
"Piter Conway is a 45 year old male who is being evaluated via a billable telephone visit.      The patient has been notified of following:     \"This telephone visit will be conducted via a call between you and your physician/provider. We have found that certain health care needs can be provided without the need for a physical exam.  This service lets us provide the care you need with a short phone conversation.  If a prescription is necessary we can send it directly to your pharmacy.  If lab work is needed we can place an order for that and you can then stop by our lab to have the test done at a later time.    Telephone visits are billed at different rates depending on your insurance coverage. During this emergency period, for some insurers they may be billed the same as an in-person visit.  Please reach out to your insurance provider with any questions.    If during the course of the call the physician/provider feels a telephone visit is not appropriate, you will not be charged for this service.\"    Patient has given verbal consent for Telephone visit?  Yes    What phone number would you like to be contacted at? [phone]    How would you like to obtain your AVS? Evaristo Nava MA    Patients Glucose Data was obtained via telephone and located in my note.    Due to the COVID 19 pandemic this visit was converted to a telephone visit in order to help prevent spread of infection in this patient and the general population.    Time of start: 7:38 am  Time of end: 7:59 am  Total duration of telephone visit: 21 minutes.    HPI  Piter Conway is a 45 year old thin male with type 1 diabetes mellitus.  Telephone visit today for diabetes follow up.  Pt was started on insulin in Nov 2019 and he is doing much better and has gained weight.  His EJ was + with C-peptide 1.0 .  For his diabetes, he is currently taking Lantus 14 units subcutaneous each am and Novolog 1 unit/15 gms CHO with meals with correction " insulin.  His A1C was 6.6 % in 2/2020.   His previous A1C was 7.4 %.  His A1C was 10.6 % in 2014 time of diabetes diagnosis.  Piter continues to use his Freestyle Claudia sensor.  I do not have the sensor download today, but he provided me with several blood sugars readings which are good.  Most of his blood sugar readings are in the low to mid 100 range.  He had 1 blood sugar reading of 176 and 1 blood sugar reading of 60.  He denies frequent hypoglycemia and is able to recognize and self treat his hypoglycemia.  On ROS today, pt reports feeling better, has more energy and gained some weight.  No polyuria, polydipsia or blurred vision at this time.  Pt denies frequent headaches, n/v,SOB,cough, fever, chills, chest pain,abd pain, diarrhea,dysuria or hematuria.  He denies numbness, tingling or pain in his feet or hands.  Piter denies foot ulcers at this time.    Diabetes Care  Retinopathy:none; he was seen by [facility] in May 2019.   Nephropathy:none; pt's urine microalbuminuria negative in 2/2020.  Neuropathy:none.  Foot Exam:no exam today.  Taking aspirin:no  Lipids: LDL 82 with HDL 56 in 2/2020.  CAD: no.  Mental health: no depression.  Insulin: Basal and meal time insulin with correction scale.  Testing: Freestyle Claudia sensor.    ROS  Please see under HPI.    Allergies  No Known Allergies    Medications  Current Outpatient Medications   Medication Sig Dispense Refill     blood glucose (NO BRAND SPECIFIED) test strip Use to test blood sugar 2 times daily or as directed. Whatever is covered by insurance 200 strip 3     blood glucose (ONETOUCH VERIO IQ) test strip Test blood sugar twice daily OR AS DIRECTED 180 each 3     blood glucose calibration (NO BRAND SPECIFIED) solution To accompany: whatever is covered by insurance test weekly 1 Bottle 3     blood glucose monitoring (ONE TOUCH DELICA) lancets Use 2 times daily. 250 each 3     blood glucose monitoring (ONETOUCH VERIO) meter device kit Use to test blood sugar 1 kit 0      Continuous Blood Gluc Sensor (FREESTYLE NAVEED 14 DAY SENSOR) MISC 1 applicator every 14 days 8 each 3     DiphenhydrAMINE HCl (BENADRYL ALLERGY PO) Take 1 tablet by mouth daily       fluticasone (FLONASE) 50 MCG/ACT spray Spray 2 sprays into both nostrils daily 16 g 3     insulin aspart (NOVOLOG FLEXPEN) 100 UNIT/ML pen Use 1 unit per 15 grams of carb plus 1 unit per 50 above 150 (Max dose 50 units) 15 mL 3     insulin glargine (LANTUS SOLOSTAR) 100 UNIT/ML pen Inject 14 units subcutaneous each am. 15 mL 3     insulin pen needle (BD MICH U/F) 32G X 4 MM miscellaneous Use daily. 250 each 3     omeprazole (PRILOSEC) 20 MG DR capsule Take 20 mg po daily 90 capsule 0     polyethylene glycol (MIRALAX) powder Take 17 g (1 capful) by mouth 2 times daily as needed for constipation 510 g 1     thin (NO BRAND SPECIFIED) lancets Use with lanceting device. whatever is covered by insurance  Testing 2 times daily 200 each 3       Family History  family history includes Diabetes in his father; Hypertension in his father.  He also has a younger brother with type 1 diabetes using an insulin pump.    Social History  Smoke: none.  ETOH: none.    Past Medical History  1. SAMANTA/DM 1 - dx 2014.    Physical Exam    No exam.      RESULTS  Creatinine   Date Value Ref Range Status   02/27/2020 0.79 0.66 - 1.25 mg/dL Final     GFR Estimate   Date Value Ref Range Status   02/27/2020 >90 >60 mL/min/[1.73_m2] Final     Comment:     Non  GFR Calc  Starting 12/18/2018, serum creatinine based estimated GFR (eGFR) will be   calculated using the Chronic Kidney Disease Epidemiology Collaboration   (CKD-EPI) equation.       Hemoglobin A1C   Date Value Ref Range Status   03/04/2019 6.9 (H) 4.1 - 5.7 % Final     Potassium   Date Value Ref Range Status   02/27/2020 4.1 3.4 - 5.3 mmol/L Final     ALT   Date Value Ref Range Status   02/27/2020 50 0 - 70 U/L Final     AST   Date Value Ref Range Status   02/27/2020 31 0 - 45 U/L Final      TSH   Date Value Ref Range Status   02/27/2020 1.52 0.40 - 4.00 mU/L Final     T4 Free   Date Value Ref Range Status   12/11/2017 1.12 0.76 - 1.46 ng/dL Final       Cholesterol   Date Value Ref Range Status   02/27/2020 146 <200 mg/dL Final   01/02/2019 160 <200 mg/dL Final     HDL Cholesterol   Date Value Ref Range Status   02/27/2020 56 >39 mg/dL Final   01/02/2019 50 >39 mg/dL Final     LDL Cholesterol Calculated   Date Value Ref Range Status   02/27/2020 82 <100 mg/dL Final     Comment:     Desirable:       <100 mg/dl   01/02/2019 100 (H) <100 mg/dL Final     Comment:     Above desirable:  100-129 mg/dl  Borderline High:  130-159 mg/dL  High:             160-189 mg/dL  Very high:       >189 mg/dl       Triglycerides   Date Value Ref Range Status   02/27/2020 38 <150 mg/dL Final   01/02/2019 47 <150 mg/dL Final     Cholesterol/HDL Ratio   Date Value Ref Range Status   08/27/2015 3.0 0.0 - 5.0 Final   09/11/2014 2.8 0.0 - 5.0 Final     A1C      6.6     2/27/2020  A1C      7.4    11/14/2019  A1C      7.0    5/14/2019  A1C      6.6    12/13/2018  A1C      6.2     6/13/2018  A1C      6.3     12/11/2017  A1C      5.7     8/8/2017  A1C      5.8    2/7/2017  A1C      6.0    10/11/2016  A1C      5.7    6/7/2016  A1C      6.3    3/4/2016  A1C      6.0    10/2/2015  A1C      6.6    4/2015  A1C     10.6   6/26/2014  A1C      6.0   2/6/2013  A1C      5.6   3/20/2012    ASSESSMENT/PLAN:    1.  TYPE 1 DIABETES MELLITUS:  Piter had a + EJ antibody and C-peptide 0.8 ng/mL with glucose 144 in Nov 2019.  He started taking insulin in Nov 2019 and he is doing much better and has gained weight.  His blood sugar readings are stable at this time.  No change in insulin doses.  Pt's urine microalbuminuria is negative in 2/2020 with a normal creat/GFR.  Pt's LDLwas 82 and HDL 56 in 2/2020.  I placed an order for an A1C and annual fasting diabetes labs to be done prior to his next visit.    2.  FOLLOW UP: with me in 4 months.

## 2020-07-21 NOTE — PROGRESS NOTES
07/21/2020  156, 118    07/20/2020  117, 130, 126, 158    07/19/2020  176, 133, 159    07/18/2020  90, 140, 114, 187    07/17/2020  147, 111, 77, 133     07/16/2020  90, 123, 143    07/15/2020  82, 163, 143    07/14/2020  ---    07/13/2020  97, 99, 156    07/12/2020  173, 60, 106, 160    07/11/2020  126, 135, 73, 134    07/10/2020  121, 119, 142, 99    07/09/2020  123, 97, 142

## 2020-07-22 ENCOUNTER — VIRTUAL VISIT (OUTPATIENT)
Dept: ENDOCRINOLOGY | Facility: CLINIC | Age: 45
End: 2020-07-22
Payer: COMMERCIAL

## 2020-07-22 DIAGNOSIS — E13.9 LADA (LATENT AUTOIMMUNE DIABETES MELLITUS IN ADULTS) (H): ICD-10-CM

## 2020-07-22 RX ORDER — INSULIN ASPART 100 [IU]/ML
INJECTION, SOLUTION INTRAVENOUS; SUBCUTANEOUS
Qty: 15 ML | Refills: 3 | Status: SHIPPED | OUTPATIENT
Start: 2020-07-22 | End: 2021-08-09

## 2020-07-22 NOTE — LETTER
7/22/2020       RE: Piter Conway  [address]  [facility] [address]     Dear Colleague,    Thank you for referring your patient, Piter Conway, to the [facility] ENDOCRINOLOGY at [facility]. Please see a copy of my visit note below.    Piter Conway is a 45 year old male who is being evaluated via a billable telephone visit.            Patients Glucose Data was obtained via telephone and located in my note.    Due to the COVID 19 pandemic this visit was converted to a telephone visit in order to help prevent spread of infection in this patient and the general population.    Time of start: 7:38 am  Time of end: 7:59 am  Total duration of telephone visit: 21 minutes.    HPI  Piter Conway is a 45 year old thin male with type 1 diabetes mellitus.  Telephone visit today for diabetes follow up.  Pt was started on insulin in Nov 2019 and he is doing much better and has gained weight.  His EJ was + with C-peptide 1.0 .  For his diabetes, he is currently taking Lantus 14 units subcutaneous each am and Novolog 1 unit/15 gms CHO with meals with correction insulin.  His A1C was 6.6 % in 2/2020.   His previous A1C was 7.4 %.  His A1C was 10.6 % in 2014 time of diabetes diagnosis.  Piter continues to use his Freestyle Claudia sensor.  I do not have the sensor download today, but he provided me with several blood sugars readings which are good.  Most of his blood sugar readings are in the low to mid 100 range.  He had 1 blood sugar reading of 176 and 1 blood sugar reading of 60.  He denies frequent hypoglycemia and is able to recognize and self treat his hypoglycemia.  On ROS today, hamzah reports feeling better, has more energy and gained some weight.  No polyuria, polydipsia or blurred vision at this time.  Pt denies frequent headaches, n/v,SOB,cough, fever, chills, chest pain,abd pain, diarrhea,dysuria or hematuria.  He denies numbness, tingling or pain in his feet or hands.  Piter denies foot  ulcers at this time.    Diabetes Care  Retinopathy:none; he was seen by [facility] in May 2019.   Nephropathy:none; pt's urine microalbuminuria negative in 2/2020.  Neuropathy:none.  Foot Exam:no exam today.  Taking aspirin:no  Lipids: LDL 82 with HDL 56 in 2/2020.  CAD: no.  Mental health: no depression.  Insulin: Basal and meal time insulin with correction scale.  Testing: Freestyle Claudia sensor.    ROS  Please see under HPI.    Allergies  No Known Allergies    Medications  Current Outpatient Medications   Medication Sig Dispense Refill     blood glucose (NO BRAND SPECIFIED) test strip Use to test blood sugar 2 times daily or as directed. Whatever is covered by insurance 200 strip 3     blood glucose (ONETOUCH VERIO IQ) test strip Test blood sugar twice daily OR AS DIRECTED 180 each 3     blood glucose calibration (NO BRAND SPECIFIED) solution To accompany: whatever is covered by insurance test weekly 1 Bottle 3     blood glucose monitoring (ONE TOUCH DELICA) lancets Use 2 times daily. 250 each 3     blood glucose monitoring (ONETOUCH VERIO) meter device kit Use to test blood sugar 1 kit 0     Continuous Blood Gluc Sensor (FREESTYLE CLAUDIA 14 DAY SENSOR) MIS 1 applicator every 14 days 8 each 3     DiphenhydrAMINE HCl (BENADRYL ALLERGY PO) Take 1 tablet by mouth daily       fluticasone (FLONASE) 50 MCG/ACT spray Spray 2 sprays into both nostrils daily 16 g 3     insulin aspart (NOVOLOG FLEXPEN) 100 UNIT/ML pen Use 1 unit per 15 grams of carb plus 1 unit per 50 above 150 (Max dose 50 units) 15 mL 3     insulin glargine (LANTUS SOLOSTAR) 100 UNIT/ML pen Inject 14 units subcutaneous each am. 15 mL 3     insulin pen needle (BD MCIH U/F) 32G X 4 MM miscellaneous Use daily. 250 each 3     omeprazole (PRILOSEC) 20 MG DR capsule Take 20 mg po daily 90 capsule 0     polyethylene glycol (MIRALAX) powder Take 17 g (1 capful) by mouth 2 times daily as needed for constipation 510 g 1     thin (NO BRAND SPECIFIED) lancets Use with  lanceting device. whatever is covered by insurance  Testing 2 times daily 200 each 3       Family History  family history includes Diabetes in his father; Hypertension in his father.  He also has a younger brother with type 1 diabetes using an insulin pump.    Social History  Smoke: none.  ETOH: none.    Past Medical History  1. SAMANTA/DM 1 - dx 2014.    Physical Exam    No exam.      RESULTS  Creatinine   Date Value Ref Range Status   02/27/2020 0.79 0.66 - 1.25 mg/dL Final     GFR Estimate   Date Value Ref Range Status   02/27/2020 >90 >60 mL/min/[1.73_m2] Final     Comment:     Non  GFR Calc  Starting 12/18/2018, serum creatinine based estimated GFR (eGFR) will be   calculated using the Chronic Kidney Disease Epidemiology Collaboration   (CKD-EPI) equation.       Hemoglobin A1C   Date Value Ref Range Status   03/04/2019 6.9 (H) 4.1 - 5.7 % Final     Potassium   Date Value Ref Range Status   02/27/2020 4.1 3.4 - 5.3 mmol/L Final     ALT   Date Value Ref Range Status   02/27/2020 50 0 - 70 U/L Final     AST   Date Value Ref Range Status   02/27/2020 31 0 - 45 U/L Final     TSH   Date Value Ref Range Status   02/27/2020 1.52 0.40 - 4.00 mU/L Final     T4 Free   Date Value Ref Range Status   12/11/2017 1.12 0.76 - 1.46 ng/dL Final       Cholesterol   Date Value Ref Range Status   02/27/2020 146 <200 mg/dL Final   01/02/2019 160 <200 mg/dL Final     HDL Cholesterol   Date Value Ref Range Status   02/27/2020 56 >39 mg/dL Final   01/02/2019 50 >39 mg/dL Final     LDL Cholesterol Calculated   Date Value Ref Range Status   02/27/2020 82 <100 mg/dL Final     Comment:     Desirable:       <100 mg/dl   01/02/2019 100 (H) <100 mg/dL Final     Comment:     Above desirable:  100-129 mg/dl  Borderline High:  130-159 mg/dL  High:             160-189 mg/dL  Very high:       >189 mg/dl       Triglycerides   Date Value Ref Range Status   02/27/2020 38 <150 mg/dL Final   01/02/2019 47 <150 mg/dL Final      Cholesterol/HDL Ratio   Date Value Ref Range Status   08/27/2015 3.0 0.0 - 5.0 Final   09/11/2014 2.8 0.0 - 5.0 Final     A1C      6.6     2/27/2020  A1C      7.4    11/14/2019  A1C      7.0    5/14/2019  A1C      6.6    12/13/2018  A1C      6.2     6/13/2018  A1C      6.3     12/11/2017  A1C      5.7     8/8/2017  A1C      5.8    2/7/2017  A1C      6.0    10/11/2016  A1C      5.7    6/7/2016  A1C      6.3    3/4/2016  A1C      6.0    10/2/2015  A1C      6.6    4/2015  A1C     10.6   6/26/2014  A1C      6.0   2/6/2013  A1C      5.6   3/20/2012    ASSESSMENT/PLAN:    1.  TYPE 1 DIABETES MELLITUS:  Piter had a + EJ antibody and C-peptide 0.8 ng/mL with glucose 144 in Nov 2019.  He started taking insulin in Nov 2019 and he is doing much better and has gained weight.  His blood sugar readings are stable at this time.  No change in insulin doses.  Pt's urine microalbuminuria is negative in 2/2020 with a normal creat/GFR.  Pt's LDLwas 82 and HDL 56 in 2/2020.  I placed an order for an A1C and annual fasting diabetes labs to be done prior to his next visit.    2.  FOLLOW UP: with me in 4 months.            07/21/2020  156, 118    07/20/2020  117, 130, 126, 158    07/19/2020  176, 133, 159    07/18/2020  90, 140, 114, 187    07/17/2020  147, 111, 77, 133     07/16/2020  90, 123, 143    07/15/2020  82, 163, 143    07/14/2020  ---    07/13/2020  97, 99, 156    07/12/2020  173, 60, 106, 160    07/11/2020  126, 135, 73, 134    07/10/2020  121, 119, 142, 99    07/09/2020  123, 97, 142        Again, thank you for allowing me to participate in the care of your patient.      Sincerely,    Sheila Brewer PA-C

## 2020-09-17 ENCOUNTER — OFFICE VISIT (OUTPATIENT)
Dept: FAMILY MEDICINE | Facility: CLINIC | Age: 45
End: 2020-09-17
Payer: COMMERCIAL

## 2020-09-17 VITALS
OXYGEN SATURATION: 96 % | SYSTOLIC BLOOD PRESSURE: 131 MMHG | BODY MASS INDEX: 23.19 KG/M2 | DIASTOLIC BLOOD PRESSURE: 88 MMHG | RESPIRATION RATE: 15 BRPM | WEIGHT: 175 LBS | HEIGHT: 73 IN | HEART RATE: 86 BPM | TEMPERATURE: 97.7 F

## 2020-09-17 DIAGNOSIS — Z23 NEED FOR PROPHYLACTIC VACCINATION AND INOCULATION AGAINST INFLUENZA: ICD-10-CM

## 2020-09-17 DIAGNOSIS — F43.23 ADJUSTMENT DISORDER WITH MIXED ANXIETY AND DEPRESSED MOOD: Primary | ICD-10-CM

## 2020-09-17 DIAGNOSIS — E13.9 LADA (LATENT AUTOIMMUNE DIABETES MELLITUS IN ADULTS) (H): ICD-10-CM

## 2020-09-17 LAB
ALBUMIN SERPL-MCNC: 3.4 G/DL (ref 3.3–4.6)
ALBUMIN UR-MCNC: NEGATIVE MG/DL
ALP SERPL-CCNC: 119 U/L (ref 40–150)
ALT SERPL-CCNC: 31 U/L (ref 0–70)
ANION GAP SERPL CALCULATED.3IONS-SCNC: 2 MMOL/L (ref 3–14)
APPEARANCE UR: CLEAR
AST SERPL-CCNC: 28 U/L (ref 0–55)
BILIRUB SERPL-MCNC: 0.4 MG/DL (ref 0.2–1.3)
BILIRUB UR QL STRIP: NEGATIVE
BUN SERPL-MCNC: 11 MG/DL (ref 5–24)
CALCIUM SERPL-MCNC: 9.2 MG/DL (ref 8.5–10.4)
CHLORIDE SERPL-SCNC: 103 MMOL/L (ref 94–109)
CHLORIDE SERPLBLD-SCNC: 101 MMOL/L (ref 94–109)
CHOLEST SERPL-MCNC: 154 MG/DL (ref 0–200)
CHOLEST/HDLC SERPL: 3.3 {RATIO} (ref 0–5)
CO2 SERPL-SCNC: 29 MMOL/L (ref 20–32)
CO2 SERPL-SCNC: 30 MMOL/L (ref 20–32)
COLOR UR AUTO: ABNORMAL
CREAT SERPL-MCNC: 0.9 MG/DL (ref 0.8–1.5)
CREAT UR-MCNC: 48 MG/DL
EGFR CALCULATED (BLACK REFERENCE): 117.4
EGFR CALCULATED (NON BLACK REFERENCE): 97
ERYTHROCYTE [DISTWIDTH] IN BLOOD BY AUTOMATED COUNT: 13.6 %
FASTING SPECIMEN: NO
GLUCOSE SERPL-MCNC: 257 MG/DL (ref 60–99)
GLUCOSE UR STRIP-MCNC: >1000 MG/DL
HBA1C MFR BLD: 7.6 % (ref 0–5.6)
HCT VFR BLD AUTO: 47.6 % (ref 40–53)
HDLC SERPL-MCNC: 46 MG/DL
HEMOGLOBIN: 15 G/DL (ref 13.3–17.7)
HGB UR QL STRIP: NEGATIVE
KETONES UR STRIP-MCNC: 5 MG/DL
LDLC SERPL CALC-MCNC: 86 MG/DL (ref 0–129)
LEUKOCYTE ESTERASE UR QL STRIP: NEGATIVE
MCH RBC QN AUTO: 29.4 PG (ref 26.5–35)
MCHC RBC AUTO-ENTMCNC: 31.5 G/DL (ref 32–36)
MCV RBC AUTO: 93.2 FL (ref 78–100)
MICROALBUMIN UR-MCNC: <5 MG/L
MICROALBUMIN/CREAT UR: NORMAL MG/G CR (ref 0–17)
MUCOUS THREADS #/AREA URNS LPF: PRESENT /LPF
NITRATE UR QL: NEGATIVE
NT-PROBNP SERPL-MCNC: <5 PG/ML (ref 0–125)
PH UR STRIP: 6 PH (ref 5–7)
PLATELET # BLD AUTO: 186 K/UL (ref 150–450)
POTASSIUM SERPL-SCNC: 4.2 MMOL/L (ref 3.4–5.3)
POTASSIUM SERPL-SCNC: 4.4 MMOL/L (ref 3.4–5.3)
PROT SERPL-MCNC: 6.9 G/DL (ref 6.8–8.8)
RBC # BLD AUTO: 5.11 M/UL (ref 4.4–5.9)
RBC #/AREA URNS AUTO: <1 /HPF (ref 0–2)
SODIUM SERPL-SCNC: 135 MMOL/L (ref 133–144)
SODIUM SERPL-SCNC: 140 MMOL/L (ref 137.3–146.3)
SOURCE: ABNORMAL
SP GR UR STRIP: 1.01 (ref 1–1.03)
TRIGL SERPL-MCNC: 107 MG/DL (ref 0–150)
TSH SERPL DL<=0.005 MIU/L-ACNC: 1.02 MU/L (ref 0.4–4)
UROBILINOGEN UR STRIP-MCNC: NORMAL MG/DL (ref 0–2)
VLDL-CHOLESTEROL: 21 (ref 7–32)
WBC # BLD AUTO: 4.6 K/UL (ref 4–11)
WBC #/AREA URNS AUTO: <1 /HPF (ref 0–5)

## 2020-09-17 RX ORDER — HYDROXYZINE HYDROCHLORIDE 25 MG/1
TABLET, FILM COATED ORAL
Qty: 60 TABLET | Refills: 1 | Status: SHIPPED | OUTPATIENT
Start: 2020-09-17 | End: 2020-10-15

## 2020-09-17 RX ORDER — LORAZEPAM 0.5 MG/1
TABLET ORAL
Qty: 30 TABLET | Refills: 0 | Status: SHIPPED | OUTPATIENT
Start: 2020-09-17 | End: 2021-06-10

## 2020-09-17 ASSESSMENT — ANXIETY QUESTIONNAIRES
3. WORRYING TOO MUCH ABOUT DIFFERENT THINGS: MORE THAN HALF THE DAYS
5. BEING SO RESTLESS THAT IT IS HARD TO SIT STILL: SEVERAL DAYS
1. FEELING NERVOUS, ANXIOUS, OR ON EDGE: MORE THAN HALF THE DAYS
GAD7 TOTAL SCORE: 10
2. NOT BEING ABLE TO STOP OR CONTROL WORRYING: MORE THAN HALF THE DAYS
IF YOU CHECKED OFF ANY PROBLEMS ON THIS QUESTIONNAIRE, HOW DIFFICULT HAVE THESE PROBLEMS MADE IT FOR YOU TO DO YOUR WORK, TAKE CARE OF THINGS AT HOME, OR GET ALONG WITH OTHER PEOPLE: SOMEWHAT DIFFICULT
7. FEELING AFRAID AS IF SOMETHING AWFUL MIGHT HAPPEN: SEVERAL DAYS
6. BECOMING EASILY ANNOYED OR IRRITABLE: SEVERAL DAYS

## 2020-09-17 ASSESSMENT — PATIENT HEALTH QUESTIONNAIRE - PHQ9
SUM OF ALL RESPONSES TO PHQ QUESTIONS 1-9: 9
5. POOR APPETITE OR OVEREATING: SEVERAL DAYS

## 2020-09-17 ASSESSMENT — MIFFLIN-ST. JEOR: SCORE: 1728.7

## 2020-09-17 NOTE — NURSING NOTE
"45 year old  Chief Complaint   Patient presents with     Anxiety     x 3 weeks, difficult sleeping and anxiety        Blood pressure 131/88, pulse 86, temperature 97.7  F (36.5  C), temperature source Skin, resp. rate 15, height 1.848 m (6' 0.75\"), weight 79.4 kg (175 lb), SpO2 96 %. Body mass index is 23.25 kg/m .  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)       Wt Readings from Last 2 Encounters:   09/17/20 79.4 kg (175 lb)   06/02/20 72.1 kg (159 lb)     BP Readings from Last 3 Encounters:   09/17/20 131/88   02/27/20 128/83   12/04/19 120/80         Current Outpatient Medications   Medication     blood glucose (NO BRAND SPECIFIED) test strip     blood glucose (ONETOUCH VERIO IQ) test strip     blood glucose calibration (NO BRAND SPECIFIED) solution     blood glucose monitoring (ONE TOUCH DELICA) lancets     blood glucose monitoring (ONETOUCH VERIO) meter device kit     Continuous Blood Gluc Sensor (FREESTYLE NAVEED 14 DAY SENSOR) MISC     DiphenhydrAMINE HCl (BENADRYL ALLERGY PO)     fluticasone (FLONASE) 50 MCG/ACT spray     insulin aspart (NOVOLOG FLEXPEN) 100 UNIT/ML pen     insulin glargine (LANTUS SOLOSTAR) 100 UNIT/ML pen     insulin pen needle (BD MICH U/F) 32G X 4 MM miscellaneous     omeprazole (PRILOSEC) 20 MG DR capsule     polyethylene glycol (MIRALAX) powder     thin (NO BRAND SPECIFIED) lancets     No current facility-administered medications for this visit.        Social History     Tobacco Use     Smoking status: Never Smoker     Smokeless tobacco: Never Used   Substance Use Topics     Alcohol use: No     Drug use: No       Health Maintenance Due   Topic Date Due     DIABETIC FOOT EXAM  1975     PNEUMOCOCCAL IMMUNIZATION 19-64 MEDIUM RISK (1 of 1 - PPSV23) 01/01/1994     PREVENTIVE CARE VISIT  02/06/2014     BMP  03/04/2020     EYE EXAM  05/22/2020     A1C  " 08/27/2020     INFLUENZA VACCINE (1) 09/01/2020       No results found for: PAP      September 17, 2020 3:27 PM

## 2020-09-17 NOTE — PATIENT INSTRUCTIONS
Shawn Ullrich, behavioral health  [facility]  [phone]    Next appt   Thursday Oct 15th at 2:20 pm  Dr. Banerjee

## 2020-09-17 NOTE — PROGRESS NOTES
Subjective     Piter Conway is a 45 year old male who presents to clinic today for the following health issues:    HPI   Insomnia  Piter is a 46 yo male who reports several month history of sleep disturbance. He easily falls asleep by 10:30 PM but awakens in the middle of the night and has difficult time getting back to sleep.  He feels tired during the day.  He believes he is awakening from sleep because he is feeling worried.  He does not snore.     Anxiety/depression  Onset/Duration: ongoing for the past 3-5 months, coincides with COVID pandemic  Description:  Symptoms are persistent, daily  Depression (if yes, do PHQ-9): yes  Anxiety (if yes, do EJ-7): yes  No use of Etoh  He is , works outside the home, doing medical transport. Has 3 children (7yo, 5yo and 6 month old infant).  History:  Recent stress or major life event: YES- COVID pandemic, worried about his own / family's safety.   Prior depression or anxiety: None  Family history of depression or anxiety: no  Alcohol/drug use: no  Difficulty sleeping: YES- awakening in the middle of the night, unable to return to sleep  Precipitating or alleviating factors: None  Therapies tried and outcome: none    PHQ 9/17/2020   PHQ-9 Total Score 9   Q9: Thoughts of better off dead/self-harm past 2 weeks Several days     EJ-7 SCORE 9/17/2020   Total Score 10     Discussed score of 1 in suicidal ideation. He has no plan and does not wish to die. He has a strong support system with family (sister who is an ER resident in [place] joined via phone today). He has a supportive spouse.   His sister was asking about use of hydroxyzine and lorazepam short term to see if this would help. He had sent me a my chart asking about these medications.     Diabetes  Piter has type I DM. He reports that blood sugars have been a bit more elevated in the past couple of months. He is seeing numbers in the 170s, sometimes >200 after meals.  He is overdue for check with the diabetes  "clinic. He would like to have labs drawn today.   Lab Results   Component Value Date    A1C 7.6 09/17/2020    A1C 6.9 03/04/2019    A1C 6.3 12/11/2017    A1C 10.6 06/26/2014    A1C 6.0 02/06/2013       Review of Systems   Constitutional, HEENT, cardiovascular, pulmonary, gi and gu systems are negative, except as otherwise noted.      Objective    /88 (BP Location: Right arm, Patient Position: Sitting, Cuff Size: Adult Regular)   Pulse 86   Temp 97.7  F (36.5  C) (Skin)   Resp 15   Ht 1.848 m (6' 0.75\")   Wt 79.4 kg (175 lb)   SpO2 96%   BMI 23.25 kg/m    Body mass index is 23.25 kg/m .  Physical Exam   GENERAL: healthy, alert and no distress, good eye contact, speech is normal.   NECK: no adenopathy, no asymmetry, masses, or scars and thyroid normal to palpation  RESP: lungs clear to auscultation - no rales, rhonchi or wheezes  CV: regular rate and rhythm, normal S1 S2, no S3 or S4, no murmur, click or rub, no peripheral edema and peripheral pulses strong  ABDOMEN: soft, nontender, no hepatosplenomegaly, no masses and bowel sounds normal  MS: no gross musculoskeletal defects noted, no edema  NEURO: Normal strength and tone, mentation intact and speech normal    Results for orders placed or performed in visit on 09/17/20   TSH with free T4 reflex     Status: None   Result Value Ref Range    TSH 1.02 0.40 - 4.00 mU/L   Hemoglobin A1c     Status: Abnormal   Result Value Ref Range    Hemoglobin A1C 7.6 (H) 0 - 5.6 %   Comprehensive Metabolic Panel ([facility])     Status: Abnormal   Result Value Ref Range    Glucose 257.0 (H) 60.0 - 99.0 mg/dL    Urea Nitrogen 11.0 5.0 - 24.0 mg/dL    Calcium 9.2 8.5 - 10.4 mg/dL    Creatinine 0.9 0.8 - 1.5 mg/dL    eGFR Calculated (Non Black Reference) 97.0 >60.0    eGFR Calculated (Black Reference) 117.4 >60.0    Sodium 140.0 137.3 - 146.3 mmol/L    Potassium 4.4 3.4 - 5.3 mmol/L    Chloride 101.0 94.0 - 109.0 mmol/L    Carbon Dioxide 29.0 20.0 - 32.0 mmol/L    Albumin 3.4 " 3.3 - 4.6 g/dL    Alkaline Phosphatase 119.0 40.0 - 150.0 U/L    ALT 31.0 0.0 - 70.0 U/L    AST 28.0 0.0 - 55.0 U/L    Bilirubin Total 0.4 0.2 - 1.3 mg/dL    Protein Total 6.9 6.8 - 8.8 g/dL   CBC with Plt ([facility])     Status: Abnormal   Result Value Ref Range    WBC 4.6 4.0 - 11.0 K/uL    RBC 5.11 4.40 - 5.90 M/uL    Hemoglobin 15.0 13.3 - 17.7 g/dL    Hematocrit 47.6 40.0 - 53.0 %    MCV 93.2 78.0 - 100.0 fL    MCH 29.4 26.5 - 35.0 pg    MCHC 31.5 (L) 32.0 - 36.0 g/dL    Platelets 186.0 150.0 - 450.0 K/uL    RDW 13.6 %   N terminal pro BNP     Status: None   Result Value Ref Range    N-Terminal Pro Bnp <5 0 - 125 pg/mL   Lipid Panel ([facility])     Status: None   Result Value Ref Range    FASTING SPECIMEN NO     Cholesterol 154.0 0.0 - 200.0    HDL Cholesterol 46.0 >40.0    Triglycerides 107.0 0.0 - 150.0    Cholesterol/HDL Ratio 3.3 0.0 - 5.0    LDL Cholesterol Direct 86.0 0.0 - 129.0    VLDL-Cholesterol 21.0 7.0 - 32.0   Albumin Random Urine Quantitative with Creat Ratio     Status: None   Result Value Ref Range    Creatinine Urine 48 mg/dL    Albumin Urine mg/L <5 mg/L    Albumin Urine mg/g Cr Unable to calculate due to low value 0 - 17 mg/g Cr   Routine UA with microscopic - No culture     Status: Abnormal   Result Value Ref Range    Color Urine Light Yellow     Appearance Urine Clear     Glucose Urine >1000 (A) NEG^Negative mg/dL    Bilirubin Urine Negative NEG^Negative    Ketones Urine 5 (A) NEG^Negative mg/dL    Specific Gravity Urine 1.015 1.003 - 1.035    Blood Urine Negative NEG^Negative    pH Urine 6.0 5.0 - 7.0 pH    Protein Albumin Urine Negative NEG^Negative mg/dL    Urobilinogen mg/dL Normal 0.0 - 2.0 mg/dL    Nitrite Urine Negative NEG^Negative    Leukocyte Esterase Urine Negative NEG^Negative    Source Midstream Urine     WBC Urine <1 0 - 5 /HPF    RBC Urine <1 0 - 2 /HPF    Mucous Urine Present (A) NEG^Negative /LPF   Electrolyte panel     Status: Abnormal   Result Value Ref Range    Sodium 135  133 - 144 mmol/L    Potassium 4.2 3.4 - 5.3 mmol/L    Chloride 103 94 - 109 mmol/L    Carbon Dioxide 30 20 - 32 mmol/L    Anion Gap 2 (L) 3 - 14 mmol/L           ASSESSMENT:  (F43.23) Adjustment disorder with mixed anxiety and depressed mood  (primary encounter diagnosis)  Comment: situational stress with COVID pandemic, concerned about personal and family safety  Plan: hydrOXYzine (ATARAX) 25 MG tablet, LORazepam         (ATIVAN) 0.5 MG tablet        You have a very good support system. Suicidal thoughts are passive, however is they worsen and you are feeling impulsive, then you should go to the [facility] ER. You made a verbal contract with me today about your safety. Our plan is to try use of hydroxyzine 25mg twice daily and lorazepam dosing at bedtime only. 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 I gave you the name of our behavioral health clinician at [facility].  I will follow up with you on October 15. Contact me for questions before then if needed.         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    (Z23) Need for prophylactic vaccination and inoculation against influenza  Comment: due for routine flu vaccine  Plan: FluBlok preserve-free/prefilled, 18+ years         [34737], ADMIN INFLUENZA VIRUS VACCINE        You were given a flu vaccination today.    Total visit time today 25 minutes.  More than 50% of the time was spent in counseling and coordination of care.    Rosamaria Banerjee MD  Internal Medicine/Pediatrics

## 2020-09-18 ASSESSMENT — ANXIETY QUESTIONNAIRES: GAD7 TOTAL SCORE: 10

## 2020-09-20 PROBLEM — F43.23 ADJUSTMENT DISORDER WITH MIXED ANXIETY AND DEPRESSED MOOD: Status: ACTIVE | Noted: 2020-09-20

## 2020-09-22 ENCOUNTER — TELEPHONE (OUTPATIENT)
Dept: ENDOCRINOLOGY | Facility: CLINIC | Age: 45
End: 2020-09-22

## 2020-09-22 NOTE — TELEPHONE ENCOUNTER
CLINIC COORDINATOR SCHEDULING NOTES    CALL RESULT: LVM    APPT TYPE:  VIRTUAL VISIT RETURN    PROVIDER: Brewer    DATE APPT NEEDED: 1st available

## 2020-09-22 NOTE — TELEPHONE ENCOUNTER
----- Message from Sheila Brewer PA-C sent at 9/21/2020  5:11 PM CDT -----  Could you please see if pt can have a virtual visit with me this week.  Thanks frances brewer

## 2020-09-24 NOTE — PROGRESS NOTES
09/24/2020  160    09/23/2020  188, 119, 136    09/22/2020  187, 123, 107, 159    09/21/2020  138, 152, 159, 133    09/20/2020  152, 136, 54, 181    09/19/2020  212, 77, 170    09/18/2020  142, 239, 234, 110    09/17/2020  171, 232, 171, 176    09/16/2020  111, 186, 150, 170    09/15/2020  196, 111, 144    09/14/2020  148, 248, 151    09/13/2020  161, 129, 190    09/12/2020  193, 126, 132, 142    09/11/2020  119, 157, 231, 213, 178    09/10/2020  110, 244, 191, 201, 219

## 2020-09-24 NOTE — PROGRESS NOTES
"Piter Conway is a 45 year old male who is being evaluated via a billable telephone visit.      The patient has been notified of following:     \"This telephone visit will be conducted via a call between you and your physician/provider. We have found that certain health care needs can be provided without the need for a physical exam.  This service lets us provide the care you need with a short phone conversation.  If a prescription is necessary we can send it directly to your pharmacy.  If lab work is needed we can place an order for that and you can then stop by our lab to have the test done at a later time.    Telephone visits are billed at different rates depending on your insurance coverage. During this emergency period, for some insurers they may be billed the same as an in-person visit.  Please reach out to your insurance provider with any questions.    If during the course of the call the physician/provider feels a telephone visit is not appropriate, you will not be charged for this service.\"    Patient has given verbal consent for Telephone visit?  Yes    What phone number would you like to be contacted at? [phone]    How would you like to obtain your AVS? Evaristo Nava MA    Patients Glucose Data was Obtained via Phone and Located in Table Below     Due to the COVID 19 pandemic this visit was converted to a telephone visit in order to help prevent spread of infection in this patient and the general population.    Time of start: 2:30 pm  Time of end: 2:54 pm  Total duration of telephone visit: 26 minutes.     FLORIDALMA Conway is a 45 year old thin male with type 1 diabetes mellitus.  Telephone visit today for diabetes follow up.  Pt was started on insulin in Nov 2019.    His EJ was + with C-peptide 0.8 ng/mL.  Recently, he has had issues with anxiety and has been seen by his physician and was started on RX and is feeling much better.  He had higher blood sugar readings during the time of his " anxiety and his blood sugar values are now stabilizing.  For his diabetes, he is currently taking Lantus 14 units subcutaneous each am and Novolog 1 unit/15 gms CHO with meals with correction insulin.  His A1C was 7.6 % on 9/17/2020 and his previous A1C was  6.6 % in 2/2020.     His A1C was 10.6 % in 2014 time of diabetes diagnosis.  Piter continues to use his Freestyle Claudia sensor. He does not have a computer at home, so he is unable to upload his Freestyle data.  He did provide us with blood sugar values and his bs readings are improving.  His FBS are in the 100 - 180 range.    Pt's blood sugars during the day are often < 250.  No frequent hypoglycemia.  On ROS today, pt reports feeling better and less anxious. No thoughts of self harm at this time.  No polyuria, polydipsia or blurred vision at this time.  He has gained weight since using insulin.  Pt denies frequent headaches, n/v,SOB,cough, fever, chills, chest pain,abd pain, diarrhea,dysuria or hematuria.  He denies numbness, tingling or pain in his feet or hands.  Piter denies foot ulcers at this time.    Diabetes Care  Retinopathy:none; he was seen by [facility] in May 2019.   Nephropathy:none; pt's urine microalbuminuria negative in 9/2020.  Neuropathy:none.  Foot Exam:no exam today.  Taking aspirin:no  Lipids: LDL 86 with HDL 46 in 9/2020.  CAD: no.  Mental health: recent dx of adjustment disorder with mixed anxiety and depressed mood- worried about COVID19 pandemic and his family's health.  Insulin: Basal and meal time insulin with correction scale.  Testing: Freestyle Claudia sensor.    ROS  Please see under HPI.    Allergies  No Known Allergies    Medications  Current Outpatient Medications   Medication Sig Dispense Refill     blood glucose (NO BRAND SPECIFIED) test strip Use to test blood sugar 2 times daily or as directed. Whatever is covered by insurance 200 strip 3     blood glucose (ONETOUCH VERIO IQ) test strip Test blood sugar twice daily OR AS DIRECTED 180  each 3     blood glucose calibration (NO BRAND SPECIFIED) solution To accompany: whatever is covered by insurance test weekly 1 Bottle 3     blood glucose monitoring (ONE TOUCH DELICA) lancets Use 2 times daily. 250 each 3     blood glucose monitoring (ONETOUCH VERIO) meter device kit Use to test blood sugar 1 kit 0     Continuous Blood Gluc Sensor (FREESTYLE NAVEED 14 DAY SENSOR) MISC 1 applicator every 14 days 8 each 3     DiphenhydrAMINE HCl (BENADRYL ALLERGY PO) Take 1 tablet by mouth daily       fluticasone (FLONASE) 50 MCG/ACT spray Spray 2 sprays into both nostrils daily 16 g 3     hydrOXYzine (ATARAX) 25 MG tablet Take one po bid 60 tablet 1     insulin aspart (NOVOLOG FLEXPEN) 100 UNIT/ML pen Use 1 unit per 15 grams of carb plus 1 unit per 50 above 150 (Max dose 50 units) 15 mL 3     insulin glargine (LANTUS SOLOSTAR) 100 UNIT/ML pen Inject 14 units subcutaneous each am. 15 mL 3     insulin pen needle (BD MICH U/F) 32G X 4 MM miscellaneous Use daily. 250 each 3     LORazepam (ATIVAN) 0.5 MG tablet Take 1 po q hs 30 tablet 0     omeprazole (PRILOSEC) 20 MG DR capsule Take 20 mg po daily 90 capsule 0     polyethylene glycol (MIRALAX) powder Take 17 g (1 capful) by mouth 2 times daily as needed for constipation 510 g 1     thin (NO BRAND SPECIFIED) lancets Use with lanceting device. whatever is covered by insurance  Testing 2 times daily 200 each 3       Family History  family history includes Aortic stenosis in his father; Diabetes in his father; Heart Disease (age of onset: 75) in his father; Heart Failure in his father; Hypertension in his father.  He also has a younger brother with type 1 diabetes using an insulin pump.    Social History  Smoke: none.  ETOH: none.    Past Medical History    1. SAMANTA/DM 1 - dx 2014.  Past Medical History:   Diagnosis Date     Diabetes (H) 2014     History of hepatitis A 2001   Adjustment disorder with mixed anxiety and depressed mood.    Physical Exam    No  exam.      RESULTS  Creatinine   Date Value Ref Range Status   09/17/2020 0.9 0.8 - 1.5 mg/dL Final     GFR Estimate   Date Value Ref Range Status   02/27/2020 >90 >60 mL/min/[1.73_m2] Final     Comment:     Non  GFR Calc  Starting 12/18/2018, serum creatinine based estimated GFR (eGFR) will be   calculated using the Chronic Kidney Disease Epidemiology Collaboration   (CKD-EPI) equation.       Hemoglobin A1C   Date Value Ref Range Status   09/17/2020 7.6 (H) 0 - 5.6 % Final     Comment:     Normal <5.7% Prediabetes 5.7-6.4%  Diabetes 6.5% or higher - adopted from ADA   consensus guidelines.       Potassium   Date Value Ref Range Status   09/17/2020 4.2 3.4 - 5.3 mmol/L Final     ALT   Date Value Ref Range Status   09/17/2020 31.0 0.0 - 70.0 U/L Final     AST   Date Value Ref Range Status   09/17/2020 28.0 0.0 - 55.0 U/L Final     TSH   Date Value Ref Range Status   09/17/2020 1.02 0.40 - 4.00 mU/L Final     T4 Free   Date Value Ref Range Status   12/11/2017 1.12 0.76 - 1.46 ng/dL Final       Cholesterol   Date Value Ref Range Status   09/17/2020 154.0 0.0 - 200.0 Final   02/27/2020 146 <200 mg/dL Final     HDL Cholesterol   Date Value Ref Range Status   09/17/2020 46.0 >40.0 Final   02/27/2020 56 >39 mg/dL Final     LDL Cholesterol Calculated   Date Value Ref Range Status   02/27/2020 82 <100 mg/dL Final     Comment:     Desirable:       <100 mg/dl   01/02/2019 100 (H) <100 mg/dL Final     Comment:     Above desirable:  100-129 mg/dl  Borderline High:  130-159 mg/dL  High:             160-189 mg/dL  Very high:       >189 mg/dl       LDL Cholesterol Direct   Date Value Ref Range Status   09/17/2020 86.0 0.0 - 129.0 Final     Triglycerides   Date Value Ref Range Status   09/17/2020 107.0 0.0 - 150.0 Final   02/27/2020 38 <150 mg/dL Final     Cholesterol/HDL Ratio   Date Value Ref Range Status   09/17/2020 3.3 0.0 - 5.0 Final   08/27/2015 3.0 0.0 - 5.0 Final       A1C     7.6     9/17/2020  A1C      6.6      2/27/2020  A1C      7.4    11/14/2019  A1C      7.0    5/14/2019  A1C      6.6    12/13/2018  A1C      6.2     6/13/2018  A1C      6.3     12/11/2017  A1C      5.7     8/8/2017  A1C      5.8    2/7/2017  A1C      6.0    10/11/2016  A1C      5.7    6/7/2016  A1C      6.3    3/4/2016  A1C      6.0    10/2/2015  A1C      6.6    4/2015  A1C     10.6   6/26/2014  A1C      6.0   2/6/2013  A1C      5.6   3/20/2012    ASSESSMENT/PLAN:    1.  TYPE 1 DIABETES MELLITUS:  Piter had a + EJ antibody and C-peptide 0.8 ng/mL with glucose 144 in Nov 2019.  He started taking insulin in Nov 2019 and he is doing much better and has gained weight.  His blood sugar values were recently elevated - he was having issues with anxiety and depressed mood. Pt is worried about the COVID19 pandemic and his family's health. He was seen by his PCP and is feeling better and his blood sugar control is improving.  No change in insulin doses.  Pt's urine microalbuminuria is negative in 9/2020 with a normal creat/GFR.  He will schedule an annual diabetic eye exam.  Piter denies any sx of neuropathy and denies foot ulcers at this time.  Pt's LDL was 86 and HDL 46 in 9/2020.  His TSH was normal on 9/17/2020.    2.  FOLLOW UP: with me in 3 months.  Pt has my contact number to call if he has any questions.

## 2020-09-25 ENCOUNTER — VIRTUAL VISIT (OUTPATIENT)
Dept: ENDOCRINOLOGY | Facility: CLINIC | Age: 45
End: 2020-09-25
Payer: COMMERCIAL

## 2020-09-25 DIAGNOSIS — E10.65 TYPE 1 DIABETES MELLITUS WITH HYPERGLYCEMIA (H): Primary | ICD-10-CM

## 2020-09-25 NOTE — LETTER
"9/25/2020       RE: Piter Said  [address]  [facility] [address]     Dear Colleague,    Thank you for referring your patient, Piter Conway, to the [facility] ENDOCRINOLOGY at [facility]. Please see a copy of my visit note below.    Piter Conway is a 45 year old male who is being evaluated via a billable telephone visit.      The patient has been notified of following:     \"This telephone visit will be conducted via a call between you and your physician/provider. We have found that certain health care needs can be provided without the need for a physical exam.  This service lets us provide the care you need with a short phone conversation.  If a prescription is necessary we can send it directly to your pharmacy.  If lab work is needed we can place an order for that and you can then stop by our lab to have the test done at a later time.    Telephone visits are billed at different rates depending on your insurance coverage. During this emergency period, for some insurers they may be billed the same as an in-person visit.  Please reach out to your insurance provider with any questions.    If during the course of the call the physician/provider feels a telephone visit is not appropriate, you will not be charged for this service.\"    Patient has given verbal consent for Telephone visit?  Yes    What phone number would you like to be contacted at? [phone]    How would you like to obtain your AVS? Evaristo Nava MA    Patients Glucose Data was Obtained via Phone and Located in Table Below     Due to the COVID 19 pandemic this visit was converted to a telephone visit in order to help prevent spread of infection in this patient and the general population.    Time of start: 2:30 pm  Time of end: 2:54 pm  Total duration of telephone visit: 26 minutes.     HPI  Piter Conway is a 45 year old thin male with type 1 diabetes mellitus.  Telephone visit today for diabetes " follow up.  Pt was started on insulin in Nov 2019.    His EJ was + with C-peptide 0.8 ng/mL.  Recently, he has had issues with anxiety and has been seen by his physician and was started on RX and is feeling much better.  He had higher blood sugar readings during the time of his anxiety and his blood sugar values are now stabilizing.  For his diabetes, he is currently taking Lantus 14 units subcutaneous each am and Novolog 1 unit/15 gms CHO with meals with correction insulin.  His A1C was 7.6 % on 9/17/2020 and his previous A1C was  6.6 % in 2/2020.     His A1C was 10.6 % in 2014 time of diabetes diagnosis.  Piter continues to use his Freestyle Claudia sensor. He does not have a computer at home, so he is unable to upload his Freestyle data.  He did provide us with blood sugar values and his bs readings are improving.  His FBS are in the 100 - 180 range.    Pt's blood sugars during the day are often < 250.  No frequent hypoglycemia.  On ROS today, pt reports feeling better and less anxious. No thoughts of self harm at this time.  No polyuria, polydipsia or blurred vision at this time.  He has gained weight since using insulin.  Pt denies frequent headaches, n/v,SOB,cough, fever, chills, chest pain,abd pain, diarrhea,dysuria or hematuria.  He denies numbness, tingling or pain in his feet or hands.  Piter denies foot ulcers at this time.    Diabetes Care  Retinopathy:none; he was seen by [facility] in May 2019.   Nephropathy:none; pt's urine microalbuminuria negative in 9/2020.  Neuropathy:none.  Foot Exam:no exam today.  Taking aspirin:no  Lipids: LDL 86 with HDL 46 in 9/2020.  CAD: no.  Mental health: recent dx of adjustment disorder with mixed anxiety and depressed mood- worried about COVID19 pandemic and his family's health.  Insulin: Basal and meal time insulin with correction scale.  Testing: Freestyle Claudia sensor.    ROS  Please see under HPI.    Allergies  No Known Allergies    Medications  Current Outpatient  Medications   Medication Sig Dispense Refill     blood glucose (NO BRAND SPECIFIED) test strip Use to test blood sugar 2 times daily or as directed. Whatever is covered by insurance 200 strip 3     blood glucose (ONETOUCH VERIO IQ) test strip Test blood sugar twice daily OR AS DIRECTED 180 each 3     blood glucose calibration (NO BRAND SPECIFIED) solution To accompany: whatever is covered by insurance test weekly 1 Bottle 3     blood glucose monitoring (ONE TOUCH DELICA) lancets Use 2 times daily. 250 each 3     blood glucose monitoring (ONETOUCH VERIO) meter device kit Use to test blood sugar 1 kit 0     Continuous Blood Gluc Sensor (FREESTYLE NAVEED 14 DAY SENSOR) [facility] 1 applicator every 14 days 8 each 3     DiphenhydrAMINE HCl (BENADRYL ALLERGY PO) Take 1 tablet by mouth daily       fluticasone (FLONASE) 50 MCG/ACT spray Spray 2 sprays into both nostrils daily 16 g 3     hydrOXYzine (ATARAX) 25 MG tablet Take one po bid 60 tablet 1     insulin aspart (NOVOLOG FLEXPEN) 100 UNIT/ML pen Use 1 unit per 15 grams of carb plus 1 unit per 50 above 150 (Max dose 50 units) 15 mL 3     insulin glargine (LANTUS SOLOSTAR) 100 UNIT/ML pen Inject 14 units subcutaneous each am. 15 mL 3     insulin pen needle (BD MICH U/F) 32G X 4 MM miscellaneous Use daily. 250 each 3     LORazepam (ATIVAN) 0.5 MG tablet Take 1 po q hs 30 tablet 0     omeprazole (PRILOSEC) 20 MG DR capsule Take 20 mg po daily 90 capsule 0     polyethylene glycol (MIRALAX) powder Take 17 g (1 capful) by mouth 2 times daily as needed for constipation 510 g 1     thin (NO BRAND SPECIFIED) lancets Use with lanceting device. whatever is covered by insurance  Testing 2 times daily 200 each 3       Family History  family history includes Aortic stenosis in his father; Diabetes in his father; Heart Disease (age of onset: 75) in his father; Heart Failure in his father; Hypertension in his father.  He also has a younger brother with type 1 diabetes using an insulin  pump.    Social History  Smoke: none.  ETOH: none.    Past Medical History    1. SAMANTA/DM 1 - dx 2014.  Past Medical History:   Diagnosis Date     Diabetes (H) 2014     History of hepatitis A 2001   Adjustment disorder with mixed anxiety and depressed mood.    Physical Exam    No exam.      RESULTS  Creatinine   Date Value Ref Range Status   09/17/2020 0.9 0.8 - 1.5 mg/dL Final     GFR Estimate   Date Value Ref Range Status   02/27/2020 >90 >60 mL/min/[1.73_m2] Final     Comment:     Non  GFR Calc  Starting 12/18/2018, serum creatinine based estimated GFR (eGFR) will be   calculated using the Chronic Kidney Disease Epidemiology Collaboration   (CKD-EPI) equation.       Hemoglobin A1C   Date Value Ref Range Status   09/17/2020 7.6 (H) 0 - 5.6 % Final     Comment:     Normal <5.7% Prediabetes 5.7-6.4%  Diabetes 6.5% or higher - adopted from ADA   consensus guidelines.       Potassium   Date Value Ref Range Status   09/17/2020 4.2 3.4 - 5.3 mmol/L Final     ALT   Date Value Ref Range Status   09/17/2020 31.0 0.0 - 70.0 U/L Final     AST   Date Value Ref Range Status   09/17/2020 28.0 0.0 - 55.0 U/L Final     TSH   Date Value Ref Range Status   09/17/2020 1.02 0.40 - 4.00 mU/L Final     T4 Free   Date Value Ref Range Status   12/11/2017 1.12 0.76 - 1.46 ng/dL Final       Cholesterol   Date Value Ref Range Status   09/17/2020 154.0 0.0 - 200.0 Final   02/27/2020 146 <200 mg/dL Final     HDL Cholesterol   Date Value Ref Range Status   09/17/2020 46.0 >40.0 Final   02/27/2020 56 >39 mg/dL Final     LDL Cholesterol Calculated   Date Value Ref Range Status   02/27/2020 82 <100 mg/dL Final     Comment:     Desirable:       <100 mg/dl   01/02/2019 100 (H) <100 mg/dL Final     Comment:     Above desirable:  100-129 mg/dl  Borderline High:  130-159 mg/dL  High:             160-189 mg/dL  Very high:       >189 mg/dl       LDL Cholesterol Direct   Date Value Ref Range Status   09/17/2020 86.0 0.0 - 129.0 Final      Triglycerides   Date Value Ref Range Status   09/17/2020 107.0 0.0 - 150.0 Final   02/27/2020 38 <150 mg/dL Final     Cholesterol/HDL Ratio   Date Value Ref Range Status   09/17/2020 3.3 0.0 - 5.0 Final   08/27/2015 3.0 0.0 - 5.0 Final       A1C     7.6     9/17/2020  A1C      6.6     2/27/2020  A1C      7.4    11/14/2019  A1C      7.0    5/14/2019  A1C      6.6    12/13/2018  A1C      6.2     6/13/2018  A1C      6.3     12/11/2017  A1C      5.7     8/8/2017  A1C      5.8    2/7/2017  A1C      6.0    10/11/2016  A1C      5.7    6/7/2016  A1C      6.3    3/4/2016  A1C      6.0    10/2/2015  A1C      6.6    4/2015  A1C     10.6   6/26/2014  A1C      6.0   2/6/2013  A1C      5.6   3/20/2012    ASSESSMENT/PLAN:    1.  TYPE 1 DIABETES MELLITUS:  Piter had a + EJ antibody and C-peptide 0.8 ng/mL with glucose 144 in Nov 2019.  He started taking insulin in Nov 2019 and he is doing much better and has gained weight.  His blood sugar values were recently elevated - he was having issues with anxiety and depressed mood. Pt is worried about the COVID19 pandemic and his family's health. He was seen by his PCP and is feeling better and his blood sugar control is improving.  No change in insulin doses.  Pt's urine microalbuminuria is negative in 9/2020 with a normal creat/GFR.  He will schedule an annual diabetic eye exam.  Piter denies any sx of neuropathy and denies foot ulcers at this time.  Pt's LDL was 86 and HDL 46 in 9/2020.  His TSH was normal on 9/17/2020.    2.  FOLLOW UP: with me in 3 months.  Pt has my contact number to call if he has any questions.                09/24/2020  160    09/23/2020  188, 119, 136    09/22/2020  187, 123, 107, 159    09/21/2020  138, 152, 159, 133    09/20/2020  152, 136, 54, 181    09/19/2020  212, 77, 170    09/18/2020  142, 239, 234, 110    09/17/2020  171, 232, 171, 176    09/16/2020  111, 186, 150, 170    09/15/2020  196, 111, 144    09/14/2020  148, 248, 151    09/13/2020  161, 129,  190    09/12/2020  193, 126, 132, 142    09/11/2020  119, 157, 231, 213, 178    09/10/2020  110, 244, 191, 201, 219    Again, thank you for allowing me to participate in the care of your patient.      Sincerely,    Sheila Brewer PA-C

## 2020-09-29 DIAGNOSIS — E10.65 TYPE 1 DIABETES MELLITUS WITH HYPERGLYCEMIA (H): Primary | ICD-10-CM

## 2020-10-13 DIAGNOSIS — E10.8 TYPE 1 DIABETES MELLITUS WITH COMPLICATION (H): ICD-10-CM

## 2020-10-14 RX ORDER — BLOOD SUGAR DIAGNOSTIC
STRIP MISCELLANEOUS
Qty: 200 EACH | Refills: 3 | Status: SHIPPED | OUTPATIENT
Start: 2020-10-14 | End: 2022-01-08

## 2020-10-15 ENCOUNTER — OFFICE VISIT (OUTPATIENT)
Dept: FAMILY MEDICINE | Facility: CLINIC | Age: 45
End: 2020-10-15
Payer: COMMERCIAL

## 2020-10-15 VITALS
TEMPERATURE: 97 F | HEART RATE: 78 BPM | SYSTOLIC BLOOD PRESSURE: 111 MMHG | OXYGEN SATURATION: 96 % | RESPIRATION RATE: 14 BRPM | HEIGHT: 73 IN | DIASTOLIC BLOOD PRESSURE: 77 MMHG | BODY MASS INDEX: 22.93 KG/M2 | WEIGHT: 173 LBS

## 2020-10-15 DIAGNOSIS — K21.9 GASTROESOPHAGEAL REFLUX DISEASE WITHOUT ESOPHAGITIS: ICD-10-CM

## 2020-10-15 DIAGNOSIS — F43.23 ADJUSTMENT DISORDER WITH MIXED ANXIETY AND DEPRESSED MOOD: Primary | ICD-10-CM

## 2020-10-15 PROCEDURE — 99000 SPECIMEN HANDLING OFFICE-LAB: CPT | Performed by: PATHOLOGY

## 2020-10-15 PROCEDURE — 87338 HPYLORI STOOL AG IA: CPT | Mod: 90 | Performed by: PATHOLOGY

## 2020-10-15 RX ORDER — HYDROXYZINE HYDROCHLORIDE 25 MG/1
TABLET, FILM COATED ORAL
Qty: 60 TABLET | Refills: 1 | Status: SHIPPED | OUTPATIENT
Start: 2020-10-15 | End: 2021-07-27

## 2020-10-15 ASSESSMENT — ANXIETY QUESTIONNAIRES
7. FEELING AFRAID AS IF SOMETHING AWFUL MIGHT HAPPEN: NOT AT ALL
2. NOT BEING ABLE TO STOP OR CONTROL WORRYING: SEVERAL DAYS
5. BEING SO RESTLESS THAT IT IS HARD TO SIT STILL: NOT AT ALL
1. FEELING NERVOUS, ANXIOUS, OR ON EDGE: SEVERAL DAYS
GAD7 TOTAL SCORE: 3
6. BECOMING EASILY ANNOYED OR IRRITABLE: NOT AT ALL
3. WORRYING TOO MUCH ABOUT DIFFERENT THINGS: SEVERAL DAYS
IF YOU CHECKED OFF ANY PROBLEMS ON THIS QUESTIONNAIRE, HOW DIFFICULT HAVE THESE PROBLEMS MADE IT FOR YOU TO DO YOUR WORK, TAKE CARE OF THINGS AT HOME, OR GET ALONG WITH OTHER PEOPLE: NOT DIFFICULT AT ALL

## 2020-10-15 ASSESSMENT — MIFFLIN-ST. JEOR: SCORE: 1719.72

## 2020-10-15 ASSESSMENT — PATIENT HEALTH QUESTIONNAIRE - PHQ9
5. POOR APPETITE OR OVEREATING: NOT AT ALL
SUM OF ALL RESPONSES TO PHQ QUESTIONS 1-9: 4

## 2020-10-15 NOTE — NURSING NOTE
"45 year old  Chief Complaint   Patient presents with     Anxiety       Blood pressure 111/77, pulse 78, temperature 97  F (36.1  C), resp. rate 14, height 1.848 m (6' 0.76\"), weight 78.5 kg (173 lb), SpO2 96 %. Body mass index is 22.98 kg/m .  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)     Adjustment disorder with mixed anxiety and depressed mood       Wt Readings from Last 2 Encounters:   10/15/20 78.5 kg (173 lb)   09/17/20 79.4 kg (175 lb)     BP Readings from Last 3 Encounters:   10/15/20 111/77   09/17/20 131/88   02/27/20 128/83         Current Outpatient Medications   Medication     ACCU-CHEK GUIDE test strip     blood glucose (ONETOUCH VERIO IQ) test strip     blood glucose calibration (NO BRAND SPECIFIED) solution     blood glucose monitoring (ONE TOUCH DELICA) lancets     blood glucose monitoring (ONETOUCH VERIO) meter device kit     Continuous Blood Gluc Sensor (FREESTYLE NAVEED 14 DAY SENSOR) MISC     DiphenhydrAMINE HCl (BENADRYL ALLERGY PO)     fluticasone (FLONASE) 50 MCG/ACT spray     hydrOXYzine (ATARAX) 25 MG tablet     insulin aspart (NOVOLOG FLEXPEN) 100 UNIT/ML pen     insulin glargine (LANTUS SOLOSTAR) 100 UNIT/ML pen     insulin pen needle (BD MICH U/F) 32G X 4 MM miscellaneous     LORazepam (ATIVAN) 0.5 MG tablet     omeprazole (PRILOSEC) 20 MG DR capsule     polyethylene glycol (MIRALAX) powder     thin (NO BRAND SPECIFIED) lancets     STATIN NOT PRESCRIBED (INTENTIONAL)     No current facility-administered medications for this visit.        Social History     Tobacco Use     Smoking status: Never Smoker     Smokeless tobacco: Never Used   Substance Use Topics     Alcohol use: No     Drug use: No       Health Maintenance Due   Topic Date Due     DIABETIC FOOT EXAM  1975     Pneumococcal Vaccine: Pediatrics (0 to 5 Years) and At-Risk Patients (6 to 64 " Years) (1 of 1 - PPSV23) 01/01/1981     PREVENTIVE CARE VISIT  02/06/2014     EYE EXAM  05/22/2020       No results found for: PAP      October 15, 2020 2:27 PM

## 2020-10-15 NOTE — PROGRESS NOTES
Piter Conway is a 45 year old male here for the following issues:    Adjustment disorder w mixed anxiety and depressed mood   Piter is a 44 yo male was last seen 9/17/20 for evaluation of situational anxiety and depression, triggered by the pandemic and concern for her personal safety and safety of his family. He was not sleeping well, was awakening in the middle of the night, worrying.  He was prescribed hydroxyzine 25mg po BID and lorazepam 0.5mg po q hs.     Today he reports he is doing much better. Is sleeping thorough the night. He is taking both medications regularly. He has a very supportive fiends and family.     PHQ 9/17/2020 10/15/2020   PHQ-9 Total Score 9 4   Q9: Thoughts of better off dead/self-harm past 2 weeks Several days Not at all     EJ-7 SCORE 9/17/2020 10/15/2020   Total Score 10 3       GERD  Piter reports a history of significant GERD that was caused by H Pylori in 2006. He was treated at that time and did very well. In the past 3 months he reports that despite using omeprazole daily he continues to have symptoms of acid reflux symptoms. No dysphagia or black stools. He reports spicy foods triggers symptoms.     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 250 each 3     blood glucose monitoring (ONETOUCH VERIO) meter device kit Use to test  "blood sugar 1 kit 0     Continuous Blood Gluc Sensor (FREESTYLE NAVEED 14 DAY SENSOR) MISC 1 applicator every 14 days 8 each 3     DiphenhydrAMINE HCl (BENADRYL ALLERGY PO) Take 1 tablet by mouth daily       fluticasone (FLONASE) 50 MCG/ACT spray Spray 2 sprays into both nostrils daily 16 g 3     hydrOXYzine (ATARAX) 25 MG tablet Take one po TID 60 tablet 1     insulin aspart (NOVOLOG FLEXPEN) 100 UNIT/ML pen Use 1 unit per 15 grams of carb plus 1 unit per 50 above 150 (Max dose 50 units) 15 mL 3     insulin glargine (LANTUS SOLOSTAR) 100 UNIT/ML pen Inject 14 units subcutaneous each am. 15 mL 3     insulin pen needle (BD MICH U/F) 32G X 4 MM miscellaneous Use daily. 250 each 3     LORazepam (ATIVAN) 0.5 MG tablet Take 1 po q hs 30 tablet 0     omeprazole (PRILOSEC) 20 MG DR capsule Take 20 mg po daily 90 capsule 1     polyethylene glycol (MIRALAX) powder Take 17 g (1 capful) by mouth 2 times daily as needed for constipation 510 g 1     thin (NO BRAND SPECIFIED) lancets Use with lanceting device. whatever is covered by insurance  Testing 2 times daily 200 each 3     STATIN NOT PRESCRIBED (INTENTIONAL) Please choose reason not prescribed, below         No Known Allergies     EXAM  /77   Pulse 78   Temp 97  F (36.1  C)   Resp 14   Ht 1.848 m (6' 0.76\")   Wt 78.5 kg (173 lb)   SpO2 96%   BMI 22.98 kg/m    Gen: Alert, pleasant, NAD  COR: S1,S2, no murmur  Lungs: CTA bilaterally, no rhonchi, wheezes or rales  Abdomen: soft, midepigastric tenderness, normal bowel sounds, no HSM  Ext: no peripheral edema, pulses full      Assessment:  (F43.23) Adjustment disorder with mixed anxiety and depressed mood  (primary encounter diagnosis)  Comment: doing much better compared to one month ago  Plan: hydrOXYzine (ATARAX) 25 MG tablet        Plan is to increase hydroxyzine to TID and try to eliminate use of lorazepam q hs.     (K21.9) Gastroesophageal reflux disease without esophagitis  Comment: persistent symptoms in the " past 3 months  Plan: omeprazole (PRILOSEC) 20 MG DR capsule,         Helicobacter pylori Antigen Stool, CANCELED: H         Pylori antigen stool        Will check H pylori today. If positive treat with antibiotics and PPI BID . If negative, proceed with EGD. Plan discussed with Piter today.     Rosamaria Banerjee MD  Internal Medicine/Pediatrics

## 2020-10-16 DIAGNOSIS — K21.9 GASTROESOPHAGEAL REFLUX DISEASE WITHOUT ESOPHAGITIS: ICD-10-CM

## 2020-10-16 ASSESSMENT — ANXIETY QUESTIONNAIRES: GAD7 TOTAL SCORE: 3

## 2020-10-19 LAB — H PYLORI AG STL QL IA: NEGATIVE

## 2020-10-28 ENCOUNTER — TELEPHONE (OUTPATIENT)
Dept: GASTROENTEROLOGY | Facility: OUTPATIENT CENTER | Age: 45
End: 2020-10-28

## 2020-10-28 NOTE — TELEPHONE ENCOUNTER
1. What insurance is in the chart? [facility]    2.  Referring Provider: Dr. Banerjee    3. BMI 23.5    4. Are you on daily home oxygen? No    5. Have you had a heart, lung, or liver transplant? No    6. Have you had a stroke or Transient ischemic attack (TIA) within 3 months? No    7. In the past year, have you had any heart related issues including cardiomyopathy or a MI within 6 months, that required cardiac stenting or other implantable devices? No    8. What type of implantable device do you have (any pacemaker, defib, LVAD)? No    9. Do you take nitroglycerin? If yes, how often? No    10. Are you currently taking any blood thinners?No    11. (Females) Are you currently pregnant? NA  If yes, how many weeks?    12. Have you had a procedure in the past that was difficult to tolerate with conscious sedation? Any allergies to Fentanyl or Versed UNKNOWN, Patient states that last time he had a procedure he was nauseated and vomited    13. Do you have a history of difficult airway? No    14. Are you taking any scheduled prescription narcotics more than once daily? No    15. Drink alcohol daily, use any street drugs, or methadone? No    16. Do you have any history of post-traumatic stress syndrome or mental health issues? No    17. Are you a diabetic? YES    18. Do you transfer independently? Yes    19.  Do you have any issues with constipation? No

## 2020-11-03 DIAGNOSIS — Z11.59 ENCOUNTER FOR SCREENING FOR OTHER VIRAL DISEASES: Primary | ICD-10-CM

## 2020-11-20 DIAGNOSIS — E13.9 LADA (LATENT AUTOIMMUNE DIABETES MELLITUS IN ADULTS) (H): ICD-10-CM

## 2020-11-21 NOTE — TELEPHONE ENCOUNTER
insulin glargine (LANTUS SOLOSTAR) 100 UNIT/ML pen  Last Written Prescription Date:  7/22/20  Last Fill Quantity: 15ml,   # refills: 3  Last Office Visit :9/25/20  Future Office visit:  None    Routing refill request to provider for review/approval because: endo triage to fill.

## 2020-11-28 DIAGNOSIS — Z11.59 ENCOUNTER FOR SCREENING FOR OTHER VIRAL DISEASES: ICD-10-CM

## 2020-11-28 DIAGNOSIS — E13.9 LADA (LATENT AUTOIMMUNE DIABETES MELLITUS IN ADULTS) (H): ICD-10-CM

## 2020-11-28 LAB
ALT SERPL W P-5'-P-CCNC: 49 U/L (ref 0–70)
AST SERPL W P-5'-P-CCNC: 11 U/L (ref 0–45)
CHOLEST SERPL-MCNC: 159 MG/DL
CREAT SERPL-MCNC: 0.89 MG/DL (ref 0.66–1.25)
GFR SERPL CREATININE-BSD FRML MDRD: >90 ML/MIN/{1.73_M2}
HBA1C MFR BLD: 7.2 % (ref 0–5.6)
HDLC SERPL-MCNC: 43 MG/DL
LDLC SERPL CALC-MCNC: 103 MG/DL
NONHDLC SERPL-MCNC: 116 MG/DL
POTASSIUM SERPL-SCNC: 4.5 MMOL/L (ref 3.4–5.3)
SARS-COV-2 RNA SPEC QL NAA+PROBE: NOT DETECTED
SPECIMEN SOURCE: NORMAL
TRIGL SERPL-MCNC: 68 MG/DL
TSH SERPL DL<=0.005 MIU/L-ACNC: 2.04 MU/L (ref 0.4–4)

## 2020-11-28 PROCEDURE — 84443 ASSAY THYROID STIM HORMONE: CPT | Performed by: PATHOLOGY

## 2020-11-28 PROCEDURE — 84132 ASSAY OF SERUM POTASSIUM: CPT | Performed by: PATHOLOGY

## 2020-11-28 PROCEDURE — U0003 INFECTIOUS AGENT DETECTION BY NUCLEIC ACID (DNA OR RNA); SEVERE ACUTE RESPIRATORY SYNDROME CORONAVIRUS 2 (SARS-COV-2) (CORONAVIRUS DISEASE [COVID-19]), AMPLIFIED PROBE TECHNIQUE, MAKING USE OF HIGH THROUGHPUT TECHNOLOGIES AS DESCRIBED BY CMS-2020-01-R: HCPCS | Mod: 90 | Performed by: PATHOLOGY

## 2020-11-28 PROCEDURE — 83036 HEMOGLOBIN GLYCOSYLATED A1C: CPT | Performed by: PATHOLOGY

## 2020-11-28 PROCEDURE — 80061 LIPID PANEL: CPT | Performed by: PATHOLOGY

## 2020-11-28 PROCEDURE — 82565 ASSAY OF CREATININE: CPT | Performed by: PATHOLOGY

## 2020-11-28 PROCEDURE — 36415 COLL VENOUS BLD VENIPUNCTURE: CPT | Performed by: PATHOLOGY

## 2020-11-28 PROCEDURE — 84450 TRANSFERASE (AST) (SGOT): CPT | Performed by: PATHOLOGY

## 2020-11-28 PROCEDURE — 84460 ALANINE AMINO (ALT) (SGPT): CPT | Performed by: PATHOLOGY

## 2020-11-29 ENCOUNTER — HEALTH MAINTENANCE LETTER (OUTPATIENT)
Age: 45
End: 2020-11-29

## 2020-12-01 ENCOUNTER — HOSPITAL ENCOUNTER (OUTPATIENT)
Facility: AMBULATORY SURGERY CENTER | Age: 45
Discharge: HOME | End: 2020-12-01
Attending: INTERNAL MEDICINE | Admitting: INTERNAL MEDICINE
Payer: COMMERCIAL

## 2020-12-01 VITALS
DIASTOLIC BLOOD PRESSURE: 71 MMHG | TEMPERATURE: 98 F | RESPIRATION RATE: 16 BRPM | BODY MASS INDEX: 22.93 KG/M2 | HEIGHT: 73 IN | OXYGEN SATURATION: 95 % | HEART RATE: 80 BPM | WEIGHT: 173 LBS | SYSTOLIC BLOOD PRESSURE: 110 MMHG

## 2020-12-01 LAB
GLUCOSE BLDC GLUCOMTR-MCNC: 146 MG/DL (ref 70–99)
UPPER GI ENDOSCOPY: NORMAL

## 2020-12-01 PROCEDURE — 88305 TISSUE EXAM BY PATHOLOGIST: CPT | Performed by: PATHOLOGY

## 2020-12-01 PROCEDURE — 43239 EGD BIOPSY SINGLE/MULTIPLE: CPT

## 2020-12-01 PROCEDURE — 88342 IMHCHEM/IMCYTCHM 1ST ANTB: CPT | Performed by: PATHOLOGY

## 2020-12-01 RX ORDER — LIDOCAINE 40 MG/G
CREAM TOPICAL
Status: DISCONTINUED | OUTPATIENT
Start: 2020-12-01 | End: 2020-12-02 | Stop reason: HOSPADM

## 2020-12-01 RX ORDER — NALOXONE HYDROCHLORIDE 0.4 MG/ML
0.2 INJECTION, SOLUTION INTRAMUSCULAR; INTRAVENOUS; SUBCUTANEOUS
Status: CANCELLED | OUTPATIENT
Start: 2020-12-01 | End: 2020-12-02

## 2020-12-01 RX ORDER — ONDANSETRON 4 MG/1
4 TABLET, ORALLY DISINTEGRATING ORAL EVERY 6 HOURS PRN
Status: CANCELLED | OUTPATIENT
Start: 2020-12-01

## 2020-12-01 RX ORDER — FLUMAZENIL 0.1 MG/ML
0.2 INJECTION, SOLUTION INTRAVENOUS
Status: CANCELLED | OUTPATIENT
Start: 2020-12-01 | End: 2020-12-02

## 2020-12-01 RX ORDER — NALOXONE HYDROCHLORIDE 0.4 MG/ML
0.4 INJECTION, SOLUTION INTRAMUSCULAR; INTRAVENOUS; SUBCUTANEOUS
Status: CANCELLED | OUTPATIENT
Start: 2020-12-01 | End: 2020-12-02

## 2020-12-01 RX ORDER — PROCHLORPERAZINE MALEATE 10 MG
10 TABLET ORAL EVERY 6 HOURS PRN
Status: CANCELLED | OUTPATIENT
Start: 2020-12-01

## 2020-12-01 RX ORDER — ONDANSETRON 2 MG/ML
4 INJECTION INTRAMUSCULAR; INTRAVENOUS EVERY 6 HOURS PRN
Status: CANCELLED | OUTPATIENT
Start: 2020-12-01

## 2020-12-01 RX ORDER — FENTANYL CITRATE 50 UG/ML
INJECTION, SOLUTION INTRAMUSCULAR; INTRAVENOUS PRN
Status: DISCONTINUED | OUTPATIENT
Start: 2020-12-01 | End: 2020-12-01 | Stop reason: HOSPADM

## 2020-12-01 RX ORDER — ONDANSETRON 2 MG/ML
4 INJECTION INTRAMUSCULAR; INTRAVENOUS
Status: DISCONTINUED | OUTPATIENT
Start: 2020-12-01 | End: 2020-12-02 | Stop reason: HOSPADM

## 2020-12-01 ASSESSMENT — MIFFLIN-ST. JEOR: SCORE: 1723.6

## 2020-12-04 LAB — COPATH REPORT: NORMAL

## 2020-12-18 DIAGNOSIS — E13.9 LADA (LATENT AUTOIMMUNE DIABETES MELLITUS IN ADULTS) (H): ICD-10-CM

## 2020-12-19 RX ORDER — PEN NEEDLE, DIABETIC 32GX 5/32"
NEEDLE, DISPOSABLE MISCELLANEOUS
Qty: 400 EACH | Refills: 2 | Status: SHIPPED | OUTPATIENT
Start: 2020-12-19 | End: 2021-10-31

## 2021-01-21 DIAGNOSIS — E10.65 TYPE 1 DIABETES MELLITUS WITH HYPERGLYCEMIA (H): ICD-10-CM

## 2021-01-24 RX ORDER — FLASH GLUCOSE SENSOR
KIT MISCELLANEOUS
Qty: 3 EACH | Refills: 11 | Status: SHIPPED | OUTPATIENT
Start: 2021-01-24 | End: 2022-02-17

## 2021-03-31 ENCOUNTER — IMMUNIZATION (OUTPATIENT)
Dept: NURSING | Facility: CLINIC | Age: 46
End: 2021-03-31
Payer: COMMERCIAL

## 2021-03-31 PROCEDURE — 0001A PR COVID VAC PFIZER DIL RECON 30 MCG/0.3 ML IM: CPT

## 2021-03-31 PROCEDURE — 91300 PR COVID VAC PFIZER DIL RECON 30 MCG/0.3 ML IM: CPT

## 2021-04-16 DIAGNOSIS — J30.2 SEASONAL ALLERGIC RHINITIS: ICD-10-CM

## 2021-04-16 RX ORDER — FLUTICASONE PROPIONATE 50 MCG
2 SPRAY, SUSPENSION (ML) NASAL DAILY
Qty: 16 G | Refills: 3 | Status: SHIPPED | OUTPATIENT
Start: 2021-04-16 | End: 2022-06-21

## 2021-04-16 NOTE — TELEPHONE ENCOUNTER
Last time prescribed: 5/4/2018 , 16 g x 3 refills  Last office visit: 10/15/2020  Next appointment: No future appointments    Prescription approved per [facility] Refill Protocol.      Ngoc Zamarripa RN  April 16, 2021 3:31 PM

## 2021-04-21 ENCOUNTER — IMMUNIZATION (OUTPATIENT)
Dept: NURSING | Facility: CLINIC | Age: 46
End: 2021-04-21
Attending: INTERNAL MEDICINE
Payer: COMMERCIAL

## 2021-04-21 PROCEDURE — 0002A PR COVID VAC PFIZER DIL RECON 30 MCG/0.3 ML IM: CPT

## 2021-04-21 PROCEDURE — 91300 PR COVID VAC PFIZER DIL RECON 30 MCG/0.3 ML IM: CPT

## 2021-06-05 ENCOUNTER — HEALTH MAINTENANCE LETTER (OUTPATIENT)
Age: 46
End: 2021-06-05

## 2021-06-09 DIAGNOSIS — K21.9 GASTROESOPHAGEAL REFLUX DISEASE WITHOUT ESOPHAGITIS: ICD-10-CM

## 2021-06-09 NOTE — TELEPHONE ENCOUNTER
Last office visit was on 10/15/2020, no future visits scheduled.    Upper endoscopy on 12/1/20    Routing refill request to provider for review/approval because:  Do you want to continue prescribing this medication?    Mar Willingham RN  06/10/21  10:06 AM

## 2021-06-17 ENCOUNTER — MYC MEDICAL ADVICE (OUTPATIENT)
Dept: ENDOCRINOLOGY | Facility: CLINIC | Age: 46
End: 2021-06-17

## 2021-06-17 ENCOUNTER — TELEPHONE (OUTPATIENT)
Dept: ENDOCRINOLOGY | Facility: CLINIC | Age: 46
End: 2021-06-17
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
## 2021-06-17 NOTE — TELEPHONE ENCOUNTER
[facility] Call Center    Phone Message    May a detailed message be left on voicemail: yes     Reason for Call: Form or Letter   Type or form/letter needing completion: Per Patient is wanting to get a call back, Patient states he is not able to work currently, Patient states his license is on hold and needing Osman Sosa to fill out a form for the DMV on patients condition. Please advise    Provider: Osman    Date form needed: asap    Once completed: Fax form to: n/a      Action Taken: Message routed to:  [facility] End[facility]    Travel Screening: Not Applicable

## 2021-06-17 NOTE — TELEPHONE ENCOUNTER
[facility] Call Center    Phone Message    May a detailed message be left on voicemail: yes     Reason for Call: Other: Pt called to verify that the nurse he spoke with earlier today was able to see his form he scanned and sent to the clinic via [employer]. Per chart it appears the scanned form will be able to be viewed. Pt needs this completed ASAP because his license is on hold, and he cannot work. Pt would like a call back from clinic to update that the scanned form will work for them, and that they have started the process.     Action Taken: Message routed to:  [facility] endo    Travel Screening: Not Applicable

## 2021-06-21 ENCOUNTER — TELEPHONE (OUTPATIENT)
Dept: ENDOCRINOLOGY | Facility: CLINIC | Age: 46
End: 2021-06-21

## 2021-06-21 NOTE — PROGRESS NOTES
Piter CHENG Said  is being evaluated via a billable video visit.      How would you like to obtain your AVS? [employer]     For the video visit, send the invitation by: Text to cell phone: [phone]     Will anyone else be joining your video visit? No    Alana LOVELACE MA    Outcome for 06/21/21 2:45 PM :Glucose data obtained via Phone and Located in Table Below

## 2021-06-21 NOTE — PROGRESS NOTES
06/21/2021  AM: 180  LUNCH: 130    06/20/2021  AM: 190  LUNCH: 115  DINNER: 101    06/19/2021  AM: 168  LUNCH: 108  DINNER: 185    06/18/2021  AM: ---  LUNCH: 175  DINNER: 104    06/17/2021  AM: ---  LUNCH: 81  DINNER: 190    06/16/2021  AM: ---  LUNCH: ---  DINNER: ---    06/15/2021  AM: ---  LUNCH: 107  DINNER: 71    06/14/2021  AM: 124  LUNCH: 171  DINNER: 92    06/13/2021  AM: 140   LUNCH: 92, 82  DINNER: 137    06/12/2021  AM: 192  LUNCH: 57  DINNER: 181    06/11/2021  AM: 168  LUNCH: 220  DINNER: 172    06/10/2021  AM: ---  LUNCH: 173  DINNER: 116    06/09/2021  AM: 196  LUNCH: 250  DINNER: 165    06/08/2021  AM: 190  LUNCH: 106  DINNER: 119

## 2021-06-21 NOTE — TELEPHONE ENCOUNTER
[facility] Call Center    Phone Message    May a detailed message be left on voicemail: yes     Reason for Call: Other: Pt missed a call from the clinic asking for his blood sugar levels for last 14 days to have prior to his upcoming appt. Pt requesting a call back because he would prefer to provide those number to the nurse over the phone vs sending them in a message through [employer]. Please call Pt back.     Action Taken: Message routed to:  [facility] endo    Travel Screening: Not Applicable

## 2021-06-21 NOTE — PATIENT INSTRUCTIONS
We appreciate your assistance in coordinating your healthcare.     Please upload your insulin pump, blood sugar meter and/or continuous glucose monitor at home 1-2 days before your next diabetes-related appointment.   This will allow your provider to review your  data before your scheduled virtual visit.    To ask a question to your Endocrine care team, please send them a [employer] message, or reach them by phone at [phone]     To expedite your medication refill(s), please contact your pharmacy and have them   fax a refill request to: [phone].  *Please allow 3 business days for routine medication refills.  *Please allow 5 business days for controlled substance medication refills.    For after-hours urgent Endocrine issues, that do not require 541, please dial [phone], and ask to speak with the Endocrinologist On-Call

## 2021-06-23 ENCOUNTER — VIRTUAL VISIT (OUTPATIENT)
Dept: ENDOCRINOLOGY | Facility: CLINIC | Age: 46
End: 2021-06-23
Payer: COMMERCIAL

## 2021-06-23 DIAGNOSIS — E10.65 TYPE 1 DIABETES MELLITUS WITH HYPERGLYCEMIA (H): Primary | ICD-10-CM

## 2021-06-23 PROCEDURE — 99215 OFFICE O/P EST HI 40 MIN: CPT | Mod: 95 | Performed by: PHYSICIAN ASSISTANT

## 2021-06-23 NOTE — PROGRESS NOTES
Due to the COVID 19 pandemic this visit was converted to a video visit in order to help prevent spread of infection in this patient and the general population.    Time of start: 7:30 am  Time of end: 7:47 am  Total duration of video visit: 17 minutes.     FLORIDALMA Conway is a 46 year old thin male with type 1 diabetes mellitus.  Video visit today for diabetes follow up.  Pt was started on insulin in Nov 2019.    His EJ was + with C-peptide 0.8 ng/mL.  He has a hx of anxiety and was started on RX and is feeling much better.  Pt has no known retinopathy, nephropathy or neuropathy.  For his diabetes, he is currently taking Lantus 14 units subcutaneous each am and Novolog 1 unit/15 gms CHO with meals with correction insulin.  His A1C was 7.2 % on 11/28/2020 and his A1C was 7.6 % on 9/17/2020.    His A1C was 10.6 % in 2014 time of diabetes diagnosis.  Piter continues to use his Freestyle Claudia sensor. He does not have a computer at home, so he is unable to upload his Freestyle data.  He did provide us with blood sugar values and his bs readings are in his chart.  His blood sugar values are above target some mornings ( FBS ).  This is when he eats at 11 pm or midnight. I asked him if he is going to eat, to take Novolog  1 unit/15 gms CHO.  No frequent hypoglycemia.  On ROS today, some mild fatigue.  Intermittent nausea. No vomiting.  No polyuria, polydipsia or blurred vision at this time.  He has gained weight since using insulin.  Pt denies frequent headaches,SOB,cough, fever, chills, chest pain,abd pain, diarrhea,dysuria or hematuria.  He denies numbness, tingling or pain in his feet or hands.  Piter denies foot ulcers at this time.    Diabetes Care  Retinopathy:none; he was seen by [facility] in May 2019. Referred to Oph for annual diabetic eye exam.  Nephropathy:none; pt's urine microalbuminuria negative in 9/2020.  Neuropathy:none.  Foot Exam:no exam today.  Taking aspirin:no  Lipids:  in 11/2020.  CAD: no.  Mental  health: recent dx of adjustment disorder with mixed anxiety and depressed mood- he was worried about COVID19 pandemic and his family's health.  Insulin: Basal and meal time insulin with correction scale.  Testing: Freestyle Claudia sensor.    ROS  Please see under HPI.    Allergies  No Known Allergies    Medications  Current Outpatient Medications   Medication Sig Dispense Refill     ACCU-CHEK GUIDE test strip TEST BLOOD SUGAR TWO TIMES A DAY OR AS DIRECTED 200 each 3     blood glucose calibration (NO BRAND SPECIFIED) solution To accompany: whatever is covered by insurance test weekly 1 Bottle 3     Continuous Blood Gluc Sensor (FREESTYLE CLAUDIA 14 DAY SENSOR) [facility] APPLY 1 SENSOR AND CHANGE EVERY 14 DAYS AS DIRECTED 3 each 11     DiphenhydrAMINE HCl (BENADRYL ALLERGY PO) Take 1 tablet by mouth daily       fluticasone (FLONASE) 50 MCG/ACT nasal spray Spray 2 sprays into both nostrils daily 16 g 3     hydrOXYzine (ATARAX) 25 MG tablet Take one po TID 60 tablet 1     insulin aspart (NOVOLOG FLEXPEN) 100 UNIT/ML pen Use 1 unit per 15 grams of carb plus 1 unit per 50 above 150 (Max dose 50 units) 15 mL 3     insulin glargine (LANTUS SOLOSTAR) 100 UNIT/ML pen INJECT 14 UNITS UNDER THE SKIN EVERY MORNING 15 mL 3     insulin pen needle (BD MICH U/F) 32G X 4 MM miscellaneous USE 4 PEN NEEDLES DAILY OR AS DIRECTED Please schedule a follow-up appointment at [phone]. 400 each 2     omeprazole (PRILOSEC) 20 MG DR capsule Take 20 mg po daily 90 capsule 1     polyethylene glycol (MIRALAX) powder Take 17 g (1 capful) by mouth 2 times daily as needed for constipation 510 g 1     STATIN NOT PRESCRIBED (INTENTIONAL) Please choose reason not prescribed, below       thin (NO BRAND SPECIFIED) lancets Use with lanceting device. whatever is covered by insurance  Testing 2 times daily 200 each 3       Family History  family history includes Aortic stenosis in his father; Diabetes in his father; Heart Disease (age of onset: 75) in his  father; Heart Failure in his father; Hypertension in his father.  He also has a younger brother with type 1 diabetes using an insulin pump.    Social History  Smoke: none.  ETOH: none.    Past Medical History    1. SAMANTA/DM 1 - dx 2014.  Past Medical History:   Diagnosis Date     Diabetes (H) 2014     History of hepatitis A 2001   Adjustment disorder with mixed anxiety and depressed mood.    Physical Exam    No exam.      RESULTS  Creatinine   Date Value Ref Range Status   11/28/2020 0.89 0.66 - 1.25 mg/dL Final     GFR Estimate   Date Value Ref Range Status   11/28/2020 >90 >60 mL/min/[1.73_m2] Final     Comment:     Non  GFR Calc  Starting 12/18/2018, serum creatinine based estimated GFR (eGFR) will be   calculated using the Chronic Kidney Disease Epidemiology Collaboration   (CKD-EPI) equation.       Hemoglobin A1C   Date Value Ref Range Status   11/28/2020 7.2 (H) 0 - 5.6 % Final     Comment:     Normal <5.7% Prediabetes 5.7-6.4%  Diabetes 6.5% or higher - adopted from ADA   consensus guidelines.       Potassium   Date Value Ref Range Status   11/28/2020 4.5 3.4 - 5.3 mmol/L Final     ALT   Date Value Ref Range Status   11/28/2020 49 0 - 70 U/L Final     AST   Date Value Ref Range Status   11/28/2020 11 0 - 45 U/L Final     TSH   Date Value Ref Range Status   11/28/2020 2.04 0.40 - 4.00 mU/L Final     T4 Free   Date Value Ref Range Status   12/11/2017 1.12 0.76 - 1.46 ng/dL Final       Cholesterol   Date Value Ref Range Status   11/28/2020 159 <200 mg/dL Final   09/17/2020 154.0 0.0 - 200.0 Final     HDL Cholesterol   Date Value Ref Range Status   11/28/2020 43 >39 mg/dL Final   09/17/2020 46.0 >40.0 Final     LDL Cholesterol Calculated   Date Value Ref Range Status   11/28/2020 103 (H) <100 mg/dL Final     Comment:     Above desirable:  100-129 mg/dl  Borderline High:  130-159 mg/dL  High:             160-189 mg/dL  Very high:       >189 mg/dl     02/27/2020 82 <100 mg/dL Final     Comment:      Desirable:       <100 mg/dl     LDL Cholesterol Direct   Date Value Ref Range Status   09/17/2020 86.0 0.0 - 129.0 Final     Triglycerides   Date Value Ref Range Status   11/28/2020 68 <150 mg/dL Final   09/17/2020 107.0 0.0 - 150.0 Final     Cholesterol/HDL Ratio   Date Value Ref Range Status   09/17/2020 3.3 0.0 - 5.0 Final   08/27/2015 3.0 0.0 - 5.0 Final     A1C     7.2     11/28/2021  A1C     7.6     9/17/2020  A1C      6.6     2/27/2020  A1C      7.4    11/14/2019  A1C      7.0    5/14/2019  A1C      6.6    12/13/2018  A1C      6.2     6/13/2018  A1C      6.3     12/11/2017  A1C      5.7     8/8/2017  A1C      5.8    2/7/2017  A1C      6.0    10/11/2016  A1C      5.7    6/7/2016  A1C      6.3    3/4/2016  A1C      6.0    10/2/2015  A1C      6.6    4/2015  A1C     10.6   6/26/2014  A1C      6.0   2/6/2013  A1C      5.6   3/20/2012    ASSESSMENT/PLAN:    1.  TYPE 1 DIABETES MELLITUS:  Piter had a + EJ antibody and C-peptide 0.8 ng/mL with glucose 144 in Nov 2019.  He started taking insulin in Nov 2019 and he is doing much better and has gained weight.  I asked him to take Novolog 1 unit/15 gms CHO for all food intake, even if he eats late at night.  No change in insulin doses today.  Pt's urine microalbuminuria was negative in 9/2020 with a normal creat/GFR in Nov 2020.  Referred to [facility]ph here for annual diabetic eye exam.  Piter denies any sx of neuropathy and denies foot ulcers at this time.  Pt's LDL was 103 in Nov 2020.  His TSH was normal in 11/2020.    2.  FATIGUE: Will check hemoglobin, TSH and celiac labs.  I also asked him to talk to his PCP re: fatigue.  He denies feeling depressed at this time.    3.  FOLLOW UP: with me in 3 months.  Pt has my contact number to call if he has any questions.    Total time spent reviewing chart, labs and glucose data today = 10 minutes.  Time for video visit today = 17 minutes.  Time for documentation today = 15 minutes.    TOTAL TIME FOR VISIT TODAY = 42 minutes.    Sheila  Osman TORRES

## 2021-06-23 NOTE — LETTER
6/23/2021       RE: Piter Conway  [address]  [place] [facility] [address]     Dear Colleague,    Thank you for referring your patient, Piter Conway, to the [facility] [place] [facility]. Please see a copy of my visit note below.    Piter Conway  is being evaluated via a billable video visit.      How would you like to obtain your AVS? MyChart     For the video visit, send the invitation by: Text to cell phone: [phone]     Will anyone else be joining your video visit? No    Alana LOVELACE MA    Outcome for 06/21/21 2:45 PM :Glucose data obtained via Phone and Located in Table Below       06/21/2021  AM: 180  LUNCH: 130    06/20/2021  AM: 190  LUNCH: 115  DINNER: 101    06/19/2021  AM: 168  LUNCH: 108  DINNER: 185    06/18/2021  AM: ---  LUNCH: 175  DINNER: 104    06/17/2021  AM: ---  LUNCH: 81  DINNER: 190    06/16/2021  AM: ---  LUNCH: ---  DINNER: ---    06/15/2021  AM: ---  LUNCH: 107  DINNER: 71    06/14/2021  AM: 124  LUNCH: 171  DINNER: 92    06/13/2021  AM: 140   LUNCH: 92, 82  DINNER: 137    06/12/2021  AM: 192  LUNCH: 57  DINNER: 181    06/11/2021  AM: 168  LUNCH: 220  DINNER: 172    06/10/2021  AM: ---  LUNCH: 173  DINNER: 116    06/09/2021  AM: 196  LUNCH: 250  DINNER: 165    06/08/2021  AM: 190  LUNCH: 106  DINNER: 119    Due to the COVID 19 pandemic this visit was converted to a video visit in order to help prevent spread of infection in this patient and the general population.    Time of start: 7:30 am  Time of end: 7:47 am  Total duration of video visit: 17 minutes.     FLORIDALMA Conway is a 46 year old thin male with type 1 diabetes mellitus.  Video visit today for diabetes follow up.  Pt was started on insulin in Nov 2019.    His EJ was + with C-peptide 0.8 ng/mL.  He has a hx of anxiety and was started on RX and is feeling much better.  Pt has no known retinopathy, nephropathy or neuropathy.  For his diabetes, he  is currently taking Lantus 14 units subcutaneous each am and Novolog 1 unit/15 gms CHO with meals with correction insulin.  His A1C was 7.2 % on 11/28/2020 and his A1C was 7.6 % on 9/17/2020.    His A1C was 10.6 % in 2014 time of diabetes diagnosis.  Piter continues to use his Freestyle Claudia sensor. He does not have a computer at home, so he is unable to upload his Freestyle data.  He did provide us with blood sugar values and his bs readings are in his chart.  His blood sugar values are above target some mornings ( FBS ).  This is when he eats at 11 pm or midnight. I asked him if he is going to eat, to take Novolog  1 unit/15 gms CHO.  No frequent hypoglycemia.  On ROS today, some mild fatigue.  Intermittent nausea. No vomiting.  No polyuria, polydipsia or blurred vision at this time.  He has gained weight since using insulin.  Pt denies frequent headaches,SOB,cough, fever, chills, chest pain,abd pain, diarrhea,dysuria or hematuria.  He denies numbness, tingling or pain in his feet or hands.  Piter denies foot ulcers at this time.    Diabetes Care  Retinopathy:none; he was seen by Oph in May 2019. Referred to Oph for annual diabetic eye exam.  Nephropathy:none; pt's urine microalbuminuria negative in 9/2020.  Neuropathy:none.  Foot Exam:no exam today.  Taking aspirin:no  Lipids:  in 11/2020.  CAD: no.  Mental health: recent dx of adjustment disorder with mixed anxiety and depressed mood- he was worried about COVID19 pandemic and his family's health.  Insulin: Basal and meal time insulin with correction scale.  Testing: Freestyle Claudia sensor.    ROS  Please see under HPI.    Allergies  No Known Allergies    Medications  Current Outpatient Medications   Medication Sig Dispense Refill     ACCU-CHEK GUIDE test strip TEST BLOOD SUGAR TWO TIMES A DAY OR AS DIRECTED 200 each 3     blood glucose calibration (NO BRAND SPECIFIED) solution To accompany: whatever is covered by insurance test weekly 1 Bottle 3      Continuous Blood Gluc Sensor (FREESTYLE NAVEED 14 DAY SENSOR) [facility] [place] APPLY 1 SENSOR AND CHANGE EVERY 14 DAYS AS DIRECTED 3 each 11     DiphenhydrAMINE HCl (BENADRYL ALLERGY PO) Take 1 tablet by mouth daily       fluticasone (FLONASE) 50 MCG/ACT nasal spray Spray 2 sprays into both nostrils daily 16 g 3     hydrOXYzine (ATARAX) 25 MG tablet Take one po TID 60 tablet 1     insulin aspart (NOVOLOG FLEXPEN) 100 UNIT/ML pen Use 1 unit per 15 grams of carb plus 1 unit per 50 above 150 (Max dose 50 units) 15 mL 3     insulin glargine (LANTUS SOLOSTAR) 100 UNIT/ML pen INJECT 14 UNITS UNDER THE SKIN EVERY MORNING 15 mL 3     insulin pen needle (BD MICH U/F) 32G X 4 MM miscellaneous USE 4 PEN NEEDLES DAILY OR AS DIRECTED Please schedule a follow-up appointment at [phone]. 400 each 2     omeprazole (PRILOSEC) 20 MG DR capsule Take 20 mg po daily 90 capsule 1     polyethylene glycol (MIRALAX) powder Take 17 g (1 capful) by mouth 2 times daily as needed for constipation 510 g 1     STATIN NOT PRESCRIBED (INTENTIONAL) Please choose reason not prescribed, below       thin (NO BRAND SPECIFIED) lancets Use with lanceting device. whatever is covered by insurance  Testing 2 times daily 200 each 3       Family History  family history includes Aortic stenosis in his father; Diabetes in his father; Heart Disease (age of onset: 75) in his father; Heart Failure in his father; Hypertension in his father.  He also has a younger brother with type 1 diabetes using an insulin pump.    Social History  Smoke: none.  ETOH: none.    Past Medical History    1. SAMANTA/DM 1 - dx 2014.  Past Medical History:   Diagnosis Date     Diabetes (H) 2014     History of hepatitis A 2001   Adjustment disorder with mixed anxiety and depressed mood.    Physical Exam    No exam.      RESULTS  Creatinine   Date Value Ref Range Status   11/28/2020 0.89 0.66 - 1.25 mg/dL Final     GFR Estimate   Date Value Ref Range Status   11/28/2020 >90 >60 mL/min/[1.73_m2] Final      Comment:     Non  GFR Calc  Starting 12/18/2018, serum creatinine based estimated GFR (eGFR) will be   calculated using the Chronic Kidney Disease Epidemiology Collaboration   (CKD-EPI) equation.       Hemoglobin A1C   Date Value Ref Range Status   11/28/2020 7.2 (H) 0 - 5.6 % Final     Comment:     Normal <5.7% Prediabetes 5.7-6.4%  Diabetes 6.5% or higher - adopted from ADA   consensus guidelines.       Potassium   Date Value Ref Range Status   11/28/2020 4.5 3.4 - 5.3 mmol/L Final     ALT   Date Value Ref Range Status   11/28/2020 49 0 - 70 U/L Final     AST   Date Value Ref Range Status   11/28/2020 11 0 - 45 U/L Final     TSH   Date Value Ref Range Status   11/28/2020 2.04 0.40 - 4.00 mU/L Final     T4 Free   Date Value Ref Range Status   12/11/2017 1.12 0.76 - 1.46 ng/dL Final       Cholesterol   Date Value Ref Range Status   11/28/2020 159 <200 mg/dL Final   09/17/2020 154.0 0.0 - 200.0 Final     HDL Cholesterol   Date Value Ref Range Status   11/28/2020 43 >39 mg/dL Final   09/17/2020 46.0 >40.0 Final     LDL Cholesterol Calculated   Date Value Ref Range Status   11/28/2020 103 (H) <100 mg/dL Final     Comment:     Above desirable:  100-129 mg/dl  Borderline High:  130-159 mg/dL  High:             160-189 mg/dL  Very high:       >189 mg/dl     02/27/2020 82 <100 mg/dL Final     Comment:     Desirable:       <100 mg/dl     LDL Cholesterol Direct   Date Value Ref Range Status   09/17/2020 86.0 0.0 - 129.0 Final     Triglycerides   Date Value Ref Range Status   11/28/2020 68 <150 mg/dL Final   09/17/2020 107.0 0.0 - 150.0 Final     Cholesterol/HDL Ratio   Date Value Ref Range Status   09/17/2020 3.3 0.0 - 5.0 Final   08/27/2015 3.0 0.0 - 5.0 Final     A1C     7.2     11/28/2021  A1C     7.6     9/17/2020  A1C      6.6     2/27/2020  A1C      7.4    11/14/2019  A1C      7.0    5/14/2019  A1C      6.6    12/13/2018  A1C      6.2     6/13/2018  A1C      6.3     12/11/2017  A1C      5.7      8/8/2017  A1C      5.8    2/7/2017  A1C      6.0    10/11/2016  A1C      5.7    6/7/2016  A1C      6.3    3/4/2016  A1C      6.0    10/2/2015  A1C      6.6    4/2015  A1C     10.6   6/26/2014  A1C      6.0   2/6/2013  A1C      5.6   3/20/2012    ASSESSMENT/PLAN:    1.  TYPE 1 DIABETES MELLITUS:  Piter had a + EJ antibody and C-peptide 0.8 ng/mL with glucose 144 in Nov 2019.  He started taking insulin in Nov 2019 and he is doing much better and has gained weight.  I asked him to take Novolog 1 unit/15 gms CHO for all food intake, even if he eats late at night.  No change in insulin doses today.  Pt's urine microalbuminuria was negative in 9/2020 with a normal creat/GFR in Nov 2020.  Referred to Oph here for annual diabetic eye exam.  Piter denies any sx of neuropathy and denies foot ulcers at this time.  Pt's LDL was 103 in Nov 2020.  His TSH was normal in 11/2020.    2.  FATIGUE: Will check hemoglobin, TSH and celiac labs.  I also asked him to talk to his PCP re: fatigue.  He denies feeling depressed at this time.    3.  FOLLOW UP: with me in 3 months.  Pt has my contact number to call if he has any questions.    Total time spent reviewing chart, labs and glucose data today = 10 minutes.  Time for video visit today = 17 minutes.  Time for documentation today = 15 minutes.    TOTAL TIME FOR VISIT TODAY = 42 minutes.    Sheila Brewer PA-C

## 2021-06-30 DIAGNOSIS — E10.65 TYPE 1 DIABETES MELLITUS WITH HYPERGLYCEMIA (H): ICD-10-CM

## 2021-06-30 LAB
HBA1C MFR BLD: 7.3 % (ref 0–5.6)
HGB BLD-MCNC: 15.9 G/DL (ref 13.3–17.7)
TSH SERPL DL<=0.005 MIU/L-ACNC: 1.19 MU/L (ref 0.4–4)

## 2021-06-30 PROCEDURE — 84443 ASSAY THYROID STIM HORMONE: CPT | Performed by: PATHOLOGY

## 2021-06-30 PROCEDURE — 83516 IMMUNOASSAY NONANTIBODY: CPT | Mod: 90 | Performed by: PATHOLOGY

## 2021-06-30 PROCEDURE — 85018 HEMOGLOBIN: CPT | Performed by: PATHOLOGY

## 2021-06-30 PROCEDURE — 83036 HEMOGLOBIN GLYCOSYLATED A1C: CPT | Performed by: PATHOLOGY

## 2021-06-30 PROCEDURE — 36415 COLL VENOUS BLD VENIPUNCTURE: CPT | Performed by: PATHOLOGY

## 2021-07-01 LAB
TTG IGA SER-ACNC: <1 U/ML
TTG IGG SER-ACNC: <1 U/ML

## 2021-07-26 ENCOUNTER — OFFICE VISIT (OUTPATIENT)
Dept: OPHTHALMOLOGY | Facility: CLINIC | Age: 46
End: 2021-07-26
Attending: PHYSICIAN ASSISTANT
Payer: COMMERCIAL

## 2021-07-26 DIAGNOSIS — H10.13 ALLERGIC CONJUNCTIVITIS OF BOTH EYES: Primary | ICD-10-CM

## 2021-07-26 DIAGNOSIS — E10.65 TYPE 1 DIABETES MELLITUS WITH HYPERGLYCEMIA (H): ICD-10-CM

## 2021-07-26 DIAGNOSIS — H04.123 DRY EYES, BILATERAL: ICD-10-CM

## 2021-07-26 DIAGNOSIS — H52.4 PRESBYOPIA: ICD-10-CM

## 2021-07-26 PROCEDURE — 92014 COMPRE OPH EXAM EST PT 1/>: CPT | Mod: GC | Performed by: OPHTHALMOLOGY

## 2021-07-26 PROCEDURE — 92015 DETERMINE REFRACTIVE STATE: CPT

## 2021-07-26 PROCEDURE — G0463 HOSPITAL OUTPT CLINIC VISIT: HCPCS | Mod: 25

## 2021-07-26 ASSESSMENT — SLIT LAMP EXAM - LIDS
COMMENTS: TRACE BLEPHARITIS
COMMENTS: TRACE BLEPHARITIS

## 2021-07-26 ASSESSMENT — VISUAL ACUITY
OS_SC: 20/20
METHOD: SNELLEN - LINEAR
OD_SC: 20/20
OD_SC+: -2
OS_SC+: -3

## 2021-07-26 ASSESSMENT — TONOMETRY
IOP_METHOD: TONOPEN
OD_IOP_MMHG: 20
OS_IOP_MMHG: 19

## 2021-07-26 ASSESSMENT — REFRACTION_MANIFEST
OD_SPHERE: -0.50
OD_ADD: +1.00
OS_SPHERE: -0.25
OD_CYLINDER: SPHERE
OS_ADD: +1.00
OS_CYLINDER: SPHERE

## 2021-07-26 ASSESSMENT — CONF VISUAL FIELD
OS_NORMAL: 1
METHOD: COUNTING FINGERS
OD_NORMAL: 1

## 2021-07-26 ASSESSMENT — EXTERNAL EXAM - RIGHT EYE: OD_EXAM: NORMAL

## 2021-07-26 ASSESSMENT — CUP TO DISC RATIO
OS_RATIO: 0.4
OD_RATIO: 0.45

## 2021-07-26 ASSESSMENT — EXTERNAL EXAM - LEFT EYE: OS_EXAM: NORMAL

## 2021-07-26 NOTE — NURSING NOTE
Chief Complaints and History of Present Illnesses   Patient presents with     Diabetic Eye Exam      Chief Complaint(s) and History of Present Illness(es)     Diabetic Eye Exam     Associated symptoms: Negative for blurred vision, double vision, flashes, floaters and tearing    Diabetes Type: Type 2    Blood Sugars: is controlled    Treatments tried: no treatments              Comments     Diabetic eye exam each eye.  Last 2 weeks feels burning both eyes.  Blood sugars average 180.  Eye meds: none   Lab Results       Component                Value               Date                       A1C                      7.3                 06/30/2021                 A1C                      7.2                 11/28/2020                 A1C                      7.6                 09/17/2020                 A1C                      6.9                 03/04/2019                 A1C                      6.3                 12/11/2017            TIARA Still 7/26/2021 3:14 PM

## 2021-07-26 NOTE — PROGRESS NOTES
FLORIDALMA Conway is a 46 year old male here for diabetic eye exam. He feels his vision is overall stable since last exam 2 years ago. He has noted intermittent burning of both eyes for the last 2 weeks. No redness or discharge. No flashes/floaters. Not using any eyedrops. No changes in visual acuity other than having to hold books further away while reading.     POH: No history of eye surgery or trauma  PMH: Diabetes type 2 on insulin  FH: No FH of AMD or glc  SH: Non-smoker    Assessment & Plan    (E10.65) Type 1 diabetes mellitus with hyperglycemia (H)  Comment: Diagnosed in 2014. On metformin. Last A1c 7.3% on 6/30/21. No diabetic retinopathy.  Plan: Discussed the importance of tight blood glucose control in the prevention of diabetic retinopathy. Recommend yearly dilated eye exam.    (H04.123) Dry eyes, bilateral  (H10.13) Allergic conjunctivitis, bilateral    Comment: Seasonal symptoms. Uses anti-histamine and nasal spray when needed  Plan: Ok to continue with OTC anti-allergy drops    (H52.4) Presbyopia  Comment: Stable  Plan: Ok to continue OTC readers PRN.     -----------------------------------------------------------------------------------    Patient disposition:   Return in about 1 year (around 7/26/2022) for diabetic eye exam, or sooner as needed.       Teaching statement:  Complete documentation of historical and exam elements from today's encounter can be found in the full encounter summary report (not reduplicated in this progress note). I personally obtained the chief complaint(s) and history of present illness.  I confirmed and edited as necessary the review of systems, past medical/surgical history, family history, social history, and examination findings as documented by others; and I examined the patient myself. I personally reviewed the relevant tests, images, and reports as documented above.     I formulated and edited as necessary the assessment and plan and discussed the findings and  management plan with the patient and family.    Analisa Long MD  Comprehensive Ophthalmology & Ocular Pathology  Department of Ophthalmology and Visual Neurosciences  [email or web address]  Pager [phone]

## 2021-07-27 ENCOUNTER — OFFICE VISIT (OUTPATIENT)
Dept: FAMILY MEDICINE | Facility: CLINIC | Age: 46
End: 2021-07-27
Payer: COMMERCIAL

## 2021-07-27 VITALS
DIASTOLIC BLOOD PRESSURE: 60 MMHG | SYSTOLIC BLOOD PRESSURE: 92 MMHG | HEIGHT: 73 IN | BODY MASS INDEX: 22.7 KG/M2 | HEART RATE: 88 BPM | OXYGEN SATURATION: 96 % | TEMPERATURE: 97.1 F | WEIGHT: 171.25 LBS

## 2021-07-27 DIAGNOSIS — K21.9 GASTROESOPHAGEAL REFLUX DISEASE WITHOUT ESOPHAGITIS: ICD-10-CM

## 2021-07-27 DIAGNOSIS — F43.23 ADJUSTMENT DISORDER WITH MIXED ANXIETY AND DEPRESSED MOOD: Primary | ICD-10-CM

## 2021-07-27 RX ORDER — HYDROXYZINE HYDROCHLORIDE 25 MG/1
TABLET, FILM COATED ORAL
Qty: 90 TABLET | Refills: 1 | Status: SHIPPED | OUTPATIENT
Start: 2021-07-27 | End: 2023-08-01

## 2021-07-27 ASSESSMENT — PATIENT HEALTH QUESTIONNAIRE - PHQ9
SUM OF ALL RESPONSES TO PHQ QUESTIONS 1-9: 8
5. POOR APPETITE OR OVEREATING: SEVERAL DAYS

## 2021-07-27 ASSESSMENT — ANXIETY QUESTIONNAIRES
GAD7 TOTAL SCORE: 7
2. NOT BEING ABLE TO STOP OR CONTROL WORRYING: SEVERAL DAYS
1. FEELING NERVOUS, ANXIOUS, OR ON EDGE: SEVERAL DAYS
IF YOU CHECKED OFF ANY PROBLEMS ON THIS QUESTIONNAIRE, HOW DIFFICULT HAVE THESE PROBLEMS MADE IT FOR YOU TO DO YOUR WORK, TAKE CARE OF THINGS AT HOME, OR GET ALONG WITH OTHER PEOPLE: SOMEWHAT DIFFICULT
3. WORRYING TOO MUCH ABOUT DIFFERENT THINGS: SEVERAL DAYS
6. BECOMING EASILY ANNOYED OR IRRITABLE: SEVERAL DAYS
7. FEELING AFRAID AS IF SOMETHING AWFUL MIGHT HAPPEN: SEVERAL DAYS
5. BEING SO RESTLESS THAT IT IS HARD TO SIT STILL: SEVERAL DAYS

## 2021-07-27 ASSESSMENT — MIFFLIN-ST. JEOR: SCORE: 1710.66

## 2021-07-27 NOTE — PROGRESS NOTES
Piter Conway is a 46 year old male here for the following issues:     Adjustment disorder with mixed anxiety and depressed mood  Last seen 10/15/2021 for anxiety. At that time, he reported improvement with use of hydroxyzine. Dose was increased to 25 mg TID. Today, Piter reports stopping his medication in mid-November 2020. He had been taking hydroxyzine 25 mg BID. He notes his anxiety symptoms have returned since 01/2021. His work has become busier, but he is otherwise unable to identify any causes for his symptoms. He has been sleeping more than usual, and notes he has missed work due to his sleep. He took some time off of work to relax, and his work was cooperative. He is not currently seeing a counselor, but is interested in seeing one. He does not drink alcohol. He exercises regularly. He has been eating well, but notes his appetite has been limited.    PHQ 9/17/2020 10/15/2020 7/27/2021   PHQ-9 Total Score 9 4 8   Q9: Thoughts of better off dead/self-harm past 2 weeks Several days Not at all Not at all     EJ-7 SCORE 9/17/2020 10/15/2020 7/27/2021   Total Score 10 3 7     Gastroesophageal reflux disease without esophagitis   At Piter's previous visit, he also reported persistent GERD sx x 3 months. H pylori ordered, which was negative. Referred for upper endoscopy, which was completed on 12/10/20. Results were reviewed today. Non obstructive Shatzki's ring at the LES. Gastric biopsies were taken, which came back as gastric antral and fundic type mucosa with mild chronic antral gastritis. Negative for intestinal metaplasia or dysplasia. Patient instructed to continue previous diet and follow up with PCP PRN. He has been taking omeprazole daily. He drinks tea and coffee. Denies dysphagia.      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)     Adjustment disorder with mixed anxiety  "and depressed mood       Current Outpatient Medications   Medication Sig Dispense Refill     ACCU-CHEK GUIDE test strip TEST BLOOD SUGAR TWO TIMES A DAY OR AS DIRECTED 200 each 3     blood glucose calibration (NO BRAND SPECIFIED) solution To accompany: whatever is covered by insurance test weekly 1 Bottle 3     Continuous Blood Gluc Sensor (FREESTYLE NAVEED 14 DAY SENSOR) [facility] APPLY 1 SENSOR AND CHANGE EVERY 14 DAYS AS DIRECTED 3 each 11     DiphenhydrAMINE HCl (BENADRYL ALLERGY PO) Take 1 tablet by mouth daily       fluticasone (FLONASE) 50 MCG/ACT nasal spray Spray 2 sprays into both nostrils daily 16 g 3     hydrOXYzine (ATARAX) 25 MG tablet Take one po TID 60 tablet 1     insulin aspart (NOVOLOG FLEXPEN) 100 UNIT/ML pen Use 1 unit per 15 grams of carb plus 1 unit per 50 above 150 (Max dose 50 units) 15 mL 3     insulin glargine (LANTUS SOLOSTAR) 100 UNIT/ML pen INJECT 14 UNITS UNDER THE SKIN EVERY MORNING 15 mL 3     insulin pen needle (BD MICH U/F) 32G X 4 MM miscellaneous USE 4 PEN NEEDLES DAILY OR AS DIRECTED Please schedule a follow-up appointment at [phone]. 400 each 2     omeprazole (PRILOSEC) 20 MG DR capsule Take 20 mg po daily 90 capsule 1     polyethylene glycol (MIRALAX) powder Take 17 g (1 capful) by mouth 2 times daily as needed for constipation 510 g 1     thin (NO BRAND SPECIFIED) lancets Use with lanceting device. whatever is covered by insurance  Testing 2 times daily 200 each 3     STATIN NOT PRESCRIBED (INTENTIONAL) Please choose reason not prescribed, below (Patient not taking: Reported on 7/27/2021)         No Known Allergies     EXAM  BP 92/60 (BP Location: Left arm, Patient Position: Sitting, Cuff Size: Adult Large)   Pulse 88   Temp 97.1  F (36.2  C) (Temporal)   Ht 1.854 m (6' 1\")   Wt 77.7 kg (171 lb 4 oz)   SpO2 96%   BMI 22.59 kg/m    Gen: Alert, pleasant, NAD      Assessment:  (F43.23) Adjustment disorder with mixed anxiety and depressed mood  (primary encounter " diagnosis)  Comment: worsened anxiety since January. Can not identify particular trigger. Ran out of medication.  Plan: hydrOXYzine (ATARAX) 25 MG tablet        Refilled medication for TID use. Recommend counseling with our [facility].    (K21.9) Gastroesophageal reflux disease without esophagitis  Comment: Stable. No break through symptoms. History of Schatzke Ring and hiatal hernia.  Plan: Continue daily omeprazole.     Follow up in September for recheck.    Rosamaria Banerjee MD  Internal Medicine/Pediatrics      I, Harry Malone, am serving as a scribe to document services personally performed by Dr. Rosamaria Banerjee, based on data collection and the provider's statements to me. Dr. Banerjee has reviewed, edited, and approved the above note.

## 2021-07-27 NOTE — NURSING NOTE
"46 year old  Chief Complaint   Patient presents with     Anxiety     need refill on med        Blood pressure 92/60, pulse 88, temperature 97.1  F (36.2  C), temperature source Temporal, height 1.854 m (6' 1\"), weight 77.7 kg (171 lb 4 oz), SpO2 96 %. Body mass index is 22.59 kg/m .  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)     Adjustment disorder with mixed anxiety and depressed mood       Wt Readings from Last 2 Encounters:   07/27/21 77.7 kg (171 lb 4 oz)   12/01/20 78.5 kg (173 lb)     BP Readings from Last 3 Encounters:   07/27/21 92/60   12/01/20 110/71   10/15/20 111/77         Current Outpatient Medications   Medication     ACCU-CHEK GUIDE test strip     blood glucose calibration (NO BRAND SPECIFIED) solution     Continuous Blood Gluc Sensor (On-Q-ityYLE NAVEED 14 DAY SENSOR) MISC     DiphenhydrAMINE HCl (BENADRYL ALLERGY PO)     fluticasone (FLONASE) 50 MCG/ACT nasal spray     hydrOXYzine (ATARAX) 25 MG tablet     insulin aspart (NOVOLOG FLEXPEN) 100 UNIT/ML pen     insulin glargine (LANTUS SOLOSTAR) 100 UNIT/ML pen     insulin pen needle (BD MICH U/F) 32G X 4 MM miscellaneous     omeprazole (PRILOSEC) 20 MG DR capsule     polyethylene glycol (MIRALAX) powder     thin (NO BRAND SPECIFIED) lancets     STATIN NOT PRESCRIBED (INTENTIONAL)     No current facility-administered medications for this visit.       Social History     Tobacco Use     Smoking status: Never Smoker     Smokeless tobacco: Never Used   Substance Use Topics     Alcohol use: No     Drug use: No       Health Maintenance Due   Topic Date Due     DIABETIC FOOT EXAM  Never done     ADVANCE CARE PLANNING  Never done     Pneumococcal Vaccine: Pediatrics (0 to 5 Years) and At-Risk Patients (6 to 64 Years) (1 of 2 - PPSV23) Never done     PREVENTIVE CARE VISIT  02/06/2014     PHQ-2  01/01/2021       No results found " for: PAP      July 27, 2021 3:08 PM

## 2021-07-28 ASSESSMENT — ANXIETY QUESTIONNAIRES: GAD7 TOTAL SCORE: 7

## 2021-08-09 DIAGNOSIS — E13.9 LADA (LATENT AUTOIMMUNE DIABETES MELLITUS IN ADULTS) (H): ICD-10-CM

## 2021-08-09 RX ORDER — INSULIN ASPART 100 [IU]/ML
INJECTION, SOLUTION INTRAVENOUS; SUBCUTANEOUS
Qty: 45 ML | Refills: 3 | Status: SHIPPED | OUTPATIENT
Start: 2021-08-09 | End: 2022-11-13

## 2021-08-09 NOTE — TELEPHONE ENCOUNTER
NOVOLOG FLEXPEN 100UNIT/ML SOPN   Last Written Prescription Date:  7/22/2020  Last Fill Quantity: 15,   # refills: 3  Last Office Visit : 6/23/2021  Future Office visit:  8/12/2021    Routing refill request to provider for review/approval because:  Drug not on the [facility], P or [facility] refill protocol or controlled substance      Lupe Oliveira RN  Central Triage Red Flags/Med Refills

## 2021-08-12 ENCOUNTER — OFFICE VISIT (OUTPATIENT)
Dept: ENDOCRINOLOGY | Facility: CLINIC | Age: 46
End: 2021-08-12
Payer: COMMERCIAL

## 2021-08-12 DIAGNOSIS — E10.628 TYPE 1 DIABETES MELLITUS WITH PRESSURE CALLUS (H): ICD-10-CM

## 2021-08-12 DIAGNOSIS — M20.5X1 HALLUX LIMITUS OF RIGHT FOOT: Primary | ICD-10-CM

## 2021-08-12 DIAGNOSIS — G62.9 PERIPHERAL NEURITIS: ICD-10-CM

## 2021-08-12 DIAGNOSIS — L84 TYPE 1 DIABETES MELLITUS WITH PRESSURE CALLUS (H): ICD-10-CM

## 2021-08-12 DIAGNOSIS — M20.5X2 HALLUX LIMITUS OF LEFT FOOT: ICD-10-CM

## 2021-08-12 DIAGNOSIS — E11.69 TYPE 2 DIABETES MELLITUS WITH DIABETIC FOOT DEFORMITY (H): ICD-10-CM

## 2021-08-12 DIAGNOSIS — M21.969 TYPE 2 DIABETES MELLITUS WITH DIABETIC FOOT DEFORMITY (H): ICD-10-CM

## 2021-08-12 PROCEDURE — 99204 OFFICE O/P NEW MOD 45 MIN: CPT | Performed by: PODIATRIST

## 2021-08-12 RX ORDER — UREA 200 MG/G
CREAM TOPICAL DAILY
Qty: 120 G | Refills: 5 | Status: SHIPPED | OUTPATIENT
Start: 2021-08-12

## 2021-08-12 NOTE — LETTER
8/12/2021       RE: Piter Conway  [address]  [place] [address]     Dear Colleague,    Thank you for referring your patient, Piter Conway, to the [facility] [place] [facility]. Please see a copy of my visit note below.    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)     Adjustment disorder with mixed anxiety and depressed mood     Past Surgical History:   Procedure Laterality Date     ESOPHAGOSCOPY, GASTROSCOPY, DUODENOSCOPY (EGD), COMBINED N/A 12/1/2020    Procedure: ESOPHAGOGASTRODUODENOSCOPY, WITH BIOPSY;  Surgeon: Fabio Mota DO;  [place] [facility] [place]     NO HISTORY OF SURGERY       Social History     Socioeconomic History     Marital status:      Spouse name: Not on file     Number of children: 3     Years of education: Not on file     Highest education level: Not on file   Occupational History     Occupation: medical transport   Tobacco Use     Smoking status: Never Smoker     Smokeless tobacco: Never Used   Substance and Sexual Activity     Alcohol use: No     Drug use: No     Sexual activity: Yes     Partners: Female     Comment:    Other Topics Concern     Parent/sibling w/ CABG, MI or angioplasty before 65F 55M? Not Asked   Social History Narrative     Not on file     Social Determinants of Health     Financial Resource Strain:      Difficulty of Paying Living Expenses:    Food Insecurity:      Worried About Running Out of Food in the Last Year:      Ran Out of Food in the Last Year:    Transportation Needs:      Lack of Transportation (Medical):      Lack of Transportation (Non-Medical):    Physical Activity:      Days of Exercise per Week:      Minutes of Exercise per  Session:    Stress:      Feeling of Stress :    Social Connections:      Frequency of Communication with Friends and Family:      Frequency of Social Gatherings with Friends and Family:      Attends Anabaptism Services:      Active Member of Clubs or Organizations:      Attends Club or Organization Meetings:      Marital Status:    Intimate Partner Violence:      Fear of Current or Ex-Partner:      Emotionally Abused:      Physically Abused:      Sexually Abused:      Family History   Problem Relation Age of Onset     Hypertension Father      Diabetes Father      Heart Disease Father 75     Heart Failure Father      Aortic stenosis Father      Glaucoma No family hx of      Macular Degeneration No family hx of      Lab Results   Component Value Date    A1C 7.3 06/30/2021    A1C 7.2 11/28/2020    A1C 7.6 09/17/2020    A1C 6.9 03/04/2019    A1C 6.3 12/11/2017     Hemoglobin   Date Value Ref Range Status   06/30/2021 15.9 13.3 - 17.7 g/dL Final       S- 46 year old presents for shooting pain and numbness along Hallux and 1st mpj right greater than left for about 3weeks.  He relates to not having this previously, no injury, wear tennis shoes, worse with activity and better with rest and he is Diabetic.  No specific treatment.    O-Dp and Pt 2/4 bilaterally, cft <3 sec bilaterally, no erythema, no edema, no drainage, no odor bilaterally.  No gross tendon voids bilaterally.  Deep tendon reflexes are intact bilaterally.  Sharp/dull is intact with 5.07 [place]ien monofilament bilaterally.  He relates symptoms are along the medial Hallux and 1st mpj when they occur.  He has mild hyperkeratotic tissue build up plantar medial Hallux, 1st mpj and 2-3 mpjs bilaterally.  Thickened skin with mild dryness on heels bilaterally.  Functional Hallux Limitus with dorsomedial 1st mpj prominence bilaterally.  Dorsally contracted digits 2-5 bilaterally.    A/p- Functional Hallux Limitus with neuritis bilaterally.  He is Diabetic with  callus and foot deformity.  This is more likely functional and less likely Diabetic peripheral neuropathy.  Diagnoses and treatment options discussed with patient.  Prescription for urea cream given and use discussed with him.  He is advised on stretching.  Prescription for Diabetic shoes and custom orthotics given and he is given the phone number and address to orthotics and prosthetics lab to get those and use discussed with him.  Return to clinic in 2 months.  Moderate level of medical decision making.      Again, thank you for allowing me to participate in the care of your patient.      Sincerely,    Yazan Parker DPM

## 2021-08-12 NOTE — PROGRESS NOTES
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)     Adjustment disorder with mixed anxiety and depressed mood     Past Surgical History:   Procedure Laterality Date     ESOPHAGOSCOPY, GASTROSCOPY, DUODENOSCOPY (EGD), COMBINED N/A 12/1/2020    Procedure: ESOPHAGOGASTRODUODENOSCOPY, WITH BIOPSY;  Surgeon: Fabio Mota DO;  Location: [facility] [place]     NO HISTORY OF SURGERY       Social History     Socioeconomic History     Marital status:      Spouse name: Not on file     Number of children: 3     Years of education: Not on file     Highest education level: Not on file   Occupational History     Occupation: medical transport   Tobacco Use     Smoking status: Never Smoker     Smokeless tobacco: Never Used   Substance and Sexual Activity     Alcohol use: No     Drug use: No     Sexual activity: Yes     Partners: Female     Comment:    Other Topics Concern     Parent/sibling w/ CABG, MI or angioplasty before 65F 55M? Not Asked   Social History Narrative     Not on file     Social Determinants of Health     Financial Resource Strain:      Difficulty of Paying Living Expenses:    Food Insecurity:      Worried About Running Out of Food in the Last Year:      Ran Out of Food in the Last Year:    Transportation Needs:      Lack of Transportation (Medical):      Lack of Transportation (Non-Medical):    Physical Activity:      Days of Exercise per Week:      Minutes of Exercise per Session:    Stress:      Feeling of Stress :    Social Connections:      Frequency of Communication with Friends and Family:      Frequency of Social Gatherings with Friends and Family:      Attends Confucianist Services:      Active Member of Clubs or Organizations:      Attends Club or Organization Meetings:       Marital Status:    Intimate Partner Violence:      Fear of Current or Ex-Partner:      Emotionally Abused:      Physically Abused:      Sexually Abused:      Family History   Problem Relation Age of Onset     Hypertension Father      Diabetes Father      Heart Disease Father 75     Heart Failure Father      Aortic stenosis Father      Glaucoma No family hx of      Macular Degeneration No family hx of      Lab Results   Component Value Date    A1C 7.3 06/30/2021    A1C 7.2 11/28/2020    A1C 7.6 09/17/2020    A1C 6.9 03/04/2019    A1C 6.3 12/11/2017     Hemoglobin   Date Value Ref Range Status   06/30/2021 15.9 13.3 - 17.7 g/dL Final       S- 46 year old presents for shooting pain and numbness along Hallux and 1st mpj right greater than left for about 3weeks.  He relates to not having this previously, no injury, wear tennis shoes, worse with activity and better with rest and he is Diabetic.  No specific treatment.    O-Dp and Pt 2/4 bilaterally, cft <3 sec bilaterally, no erythema, no edema, no drainage, no odor bilaterally.  No gross tendon voids bilaterally.  Deep tendon reflexes are intact bilaterally.  Sharp/dull is intact with 5.07 [place] Weinstien monofilament bilaterally.  He relates symptoms are along the medial Hallux and 1st mpj when they occur.  He has mild hyperkeratotic tissue build up plantar medial Hallux, 1st mpj and 2-3 mpjs bilaterally.  Thickened skin with mild dryness on heels bilaterally.  Functional Hallux Limitus with dorsomedial 1st mpj prominence bilaterally.  Dorsally contracted digits 2-5 bilaterally.    A/p- Functional Hallux Limitus with neuritis bilaterally.  He is Diabetic with callus and foot deformity.  This is more likely functional and less likely Diabetic peripheral neuropathy.  Diagnoses and treatment options discussed with patient.  Prescription for urea cream given and use discussed with him.  He is advised on stretching.  Prescription for Diabetic shoes and custom orthotics  given and he is given the phone number and address to orthotics and prosthetics lab to get those and use discussed with him.  Return to clinic in 2 months.  Moderate level of medical decision making.

## 2021-08-25 ENCOUNTER — ANCILLARY PROCEDURE (OUTPATIENT)
Dept: GENERAL RADIOLOGY | Facility: CLINIC | Age: 46
End: 2021-08-25
Attending: STUDENT IN AN ORGANIZED HEALTH CARE EDUCATION/TRAINING PROGRAM
Payer: COMMERCIAL

## 2021-08-25 ENCOUNTER — OFFICE VISIT (OUTPATIENT)
Dept: URGENT CARE | Facility: URGENT CARE | Age: 46
End: 2021-08-25
Payer: COMMERCIAL

## 2021-08-25 VITALS
HEART RATE: 82 BPM | TEMPERATURE: 97.1 F | DIASTOLIC BLOOD PRESSURE: 70 MMHG | OXYGEN SATURATION: 98 % | SYSTOLIC BLOOD PRESSURE: 104 MMHG

## 2021-08-25 DIAGNOSIS — M79.662 PAIN OF LEFT LOWER LEG: Primary | ICD-10-CM

## 2021-08-25 PROCEDURE — 99213 OFFICE O/P EST LOW 20 MIN: CPT | Performed by: STUDENT IN AN ORGANIZED HEALTH CARE EDUCATION/TRAINING PROGRAM

## 2021-08-25 PROCEDURE — 73630 X-RAY EXAM OF FOOT: CPT | Mod: LT | Performed by: RADIOLOGY

## 2021-08-25 RX ORDER — IBUPROFEN 600 MG/1
600 TABLET, FILM COATED ORAL 3 TIMES DAILY
Qty: 15 TABLET | Refills: 0 | Status: SHIPPED | OUTPATIENT
Start: 2021-08-25 | End: 2021-08-30

## 2021-08-25 NOTE — PROGRESS NOTES
Assessment & Plan     Pain of left lower leg  Suspect foot sprain. Discussed symptomatic cares including NSAIDs, activity as tolerated. X ray without fracture. Low suspicion for stress fracture after 1 day of house work, but if pain persists would consider re imaging for stress fracture. No signs of infection. Given location low suspicion for DVT. Discussed reasons to return to clinic. Pt in agreement with plan.  - XR Foot Left G/E 3 Views  - ibuprofen (ADVIL/MOTRIN) 600 MG tablet  Dispense: 15 tablet; Refill: 0           Return if symptoms worsen or fail to improve.    Alyson Mcdermott MD  [facility] U[facility] [place]    Rubia Dumas is a 46 year old male who presents to clinic today for the following health issues:  Chief Complaint   Patient presents with     Leg Pain     left leg, starting to swell up monday     HPI  MS Injury/Pain  Onset of symptoms was 2 day(s) ago.  Location: left foot  Context: was doing a lot of housework over the weekend; no specific injury  Started having pain over lateral left foot and ankle then noticed swelling of foot; thinks he twisted the foot; swelling is distal to ankle on dorsum of foot  First time this has happened  Course of symptoms is worsening.    Current and Associated symptoms: swelling and pain on lateral aspect of left foot  Denies  Redness, Decreased range of motion and Stiffness  Aggravating Factors: nothing; able to ambulate and bear weight  Therapies to improve symptoms include: Tylenol          Objective    /70 (BP Location: Left arm, Patient Position: Sitting, Cuff Size: Adult Regular)   Pulse 82   Temp 97.1  F (36.2  C) (Temporal)   SpO2 98%   Physical Exam   GEN: Alert and appropriately interactive for age  EYES: Eyes grossly normal to inspection, conjunctivae and sclerae normal  RESP: Breathing comfortably on room air   CV: Warm and well perfused peripheral extremities   MS: left dorsum of foot swollen on lateral aspect without  overlying erythema; no tenderness to palpation of lateral and medial malleoli or metatarsals; full ROM of left ankle and no ankle effusion; no calf swelling or tenderness to palpation  NEURO: Alert and oriented. Gait normal. Speech fluent.    PSYCH: Affect normal/bright. Appearance well groomed.     Xray - Reviewed and interpreted by me.  No fracture or dislocation of foot.

## 2021-08-25 NOTE — PATIENT INSTRUCTIONS
Patient Education     Foot Sprain    A sprain is a stretching or tearing of the ligaments that hold a joint together. There are usually no broken bones. Sprains generally take from 3 to 6 weeks to heal. A sprain may be treated with a splint, walking cast, or special boot. Mild sprains may not need any additional support.  Home care  The following guidelines will help you care for your injury at home:    Keep your leg elevated when sitting or lying down. This is very important during the first 48 hours to reduce swelling. Stay off the injured foot as much as possible until you can walk on it without pain. If needed, you may use crutches during the first week for this purpose. Crutches can be rented at many pharmacies or surgical/orthopedic supply stores.    You may be given a cast shoe to wear to prevent movement in your foot. If not, you can use a sandal or any shoe that does not put pressure on the injured area until the swelling and pain go away. If using a sandal, be careful not to hit your foot against anything, since another injury could make the sprain worse.    Apply an ice pack over the injured area for 15 to 20 minutes every 3 to 6 hours. You should do this for the first 24 to 48 hours. You can make an ice pack by filling a plastic bag that seals at the top with ice cubes and then wrapping it with a thin towel. Continue to use ice packs for relief of pain and swelling as needed. As the ice melts, try not to get the wrap, splint, or cast wet. After 48 hours, apply heat from a warm shower or bath for 20 minutes several times daily. Alternating ice and heat may also be helpful.    You may use over-the-counter pain medicine to control pain, unless another medicine was prescribed. If you have chronic liver or kidney disease or ever had a stomach ulcer or gastrointestinal bleeding, talk with your healthcare provider before using these medicines.    If you were given a splint or cast, keep it dry. Bathe with your  splint or cast well out of the water, protected with 2 large plastic bags, sealed with tape or rubber-bands at the top end. If a fiberglass splint or cast gets wet, you can dry it with a hair dryer on cool setting.    You may return to sports after healing, when you can run without pain.  Follow-up care  Follow up with your healthcare provider as directed. Sometimes fractures don t show up on the first X-ray. Bruises and sprains can sometimes hurt as much as a fracture. These injuries can take time to heal completely. If your symptoms don t improve or they get worse, talk with your healthcare provider. You may need a repeat X-ray or other tests.  When to seek medical advice  Call your healthcare provider right away if any of these occur:    The plaster cast or splint gets wet or soft    The fiberglass cast or splint gets wet and does not dry for 24 hours    Pain or swelling increases, or redness appears    A bad odor comes from within the cast    Fever of 100.4 F (38 C) or above lasting for 24 to 48 hours, or as advised    Chills    Toes on the injured foot become cold, blue, numb, or tingly

## 2021-09-16 ENCOUNTER — VIRTUAL VISIT (OUTPATIENT)
Dept: FAMILY MEDICINE | Facility: CLINIC | Age: 46
End: 2021-09-16
Payer: COMMERCIAL

## 2021-09-16 DIAGNOSIS — K21.9 GASTROESOPHAGEAL REFLUX DISEASE WITHOUT ESOPHAGITIS: ICD-10-CM

## 2021-09-16 DIAGNOSIS — F41.9 ANXIETY: ICD-10-CM

## 2021-09-16 RX ORDER — HYDROXYZINE HYDROCHLORIDE 25 MG/1
TABLET, FILM COATED ORAL
Qty: 90 TABLET | Refills: 1 | Status: CANCELLED | OUTPATIENT
Start: 2021-09-16

## 2021-09-16 RX ORDER — FLUOXETINE 10 MG/1
10 CAPSULE ORAL DAILY
Qty: 30 CAPSULE | Refills: 1 | Status: SHIPPED | OUTPATIENT
Start: 2021-09-16 | End: 2021-12-07

## 2021-09-16 ASSESSMENT — ANXIETY QUESTIONNAIRES
5. BEING SO RESTLESS THAT IT IS HARD TO SIT STILL: NOT AT ALL
GAD7 TOTAL SCORE: 2
8. IF YOU CHECKED OFF ANY PROBLEMS, HOW DIFFICULT HAVE THESE MADE IT FOR YOU TO DO YOUR WORK, TAKE CARE OF THINGS AT HOME, OR GET ALONG WITH OTHER PEOPLE?: NOT DIFFICULT AT ALL
GAD7 TOTAL SCORE: 2
4. TROUBLE RELAXING: NOT AT ALL
3. WORRYING TOO MUCH ABOUT DIFFERENT THINGS: SEVERAL DAYS
6. BECOMING EASILY ANNOYED OR IRRITABLE: NOT AT ALL
7. FEELING AFRAID AS IF SOMETHING AWFUL MIGHT HAPPEN: NOT AT ALL
GAD7 TOTAL SCORE: 2
7. FEELING AFRAID AS IF SOMETHING AWFUL MIGHT HAPPEN: NOT AT ALL
2. NOT BEING ABLE TO STOP OR CONTROL WORRYING: SEVERAL DAYS
1. FEELING NERVOUS, ANXIOUS, OR ON EDGE: NOT AT ALL

## 2021-09-16 ASSESSMENT — PATIENT HEALTH QUESTIONNAIRE - PHQ9
SUM OF ALL RESPONSES TO PHQ QUESTIONS 1-9: 2
SUM OF ALL RESPONSES TO PHQ QUESTIONS 1-9: 2
10. IF YOU CHECKED OFF ANY PROBLEMS, HOW DIFFICULT HAVE THESE PROBLEMS MADE IT FOR YOU TO DO YOUR WORK, TAKE CARE OF THINGS AT HOME, OR GET ALONG WITH OTHER PEOPLE: NOT DIFFICULT AT ALL

## 2021-09-16 NOTE — PROGRESS NOTES
Piter is a 46 year old who is being evaluated via a billable video visit.      How would you like to obtain your AVS? Mail a copy  Will anyone else be joining your video visit? No     Video Start Time: 3:45 PM  Video End Time: 4:09 PM  Total Time: 26 minutes    Assessment & Plan     (F41.9) Anxiety  Comment: Piter currently using hydroxyzine for anxiety, would like to add a low-dose of fluoxetine  Plan: FLUoxetine (PROZAC) 10 MG capsule        Start fluoxetine 10 mg daily. Recommend continuing hydroxyzine 25 mg TID at least for now.Discussed potential benefits and side effects of this medication with the patient. Contact MD for any acute concerns/questions.   Follow-up in 4-6 weeks for a medication recheck, or sooner if needed.     (K21.9) Gastroesophageal reflux disease without esophagitis  Comment: hx of GERD with Schatzki's ring, doing well on omeprazole daily  Plan: omeprazole (PRILOSEC) 20 MG DR capsule        Recommend continuing with omeprazole daily. Discussed that this is likely a life-long medication due to his Schatzki's ring.     35 minutes spend on the date of this encounter doing chart review, history and exam, documentation and further activities as noted above.     Follow up 4-6 wks    Rosamaria Banerjee MD  Internal Medicine/Pediatrics      I, Sofya Ceron, am serving as a scribe to document services personally performed by Dr. Rosamaria Banerjee, based on data collection and the provider's statements to me. Dr. Banerjee has reviewed, edited, and approved the above note.       Subjective    Piter Conway is a 46 year old male with hx of insulin dependent DM, GERD, Allergies and hx of situational stress with anxiety and depression.    Anxiety  Piter has history of anxiety and was prescribed hydroxyzine 25mg and lorazepam in Sept 2020. He never filled the lorazepam but used hydroxyzine until December. He restarted it in July 2021 and has been taking it three times a day. It has been helpful. No current counseling. he  "has a very supportive family and group of friends.     Today, Piter reports he utilizes positive thinking and self talk to manage his worry and depressive thoughts.  He has been sleeping well and is able to fall asleep, but often awakens with worry.     Piter asked about alternative medications, specifically if hydroxyzine is \"the best\" medication for his symptoms. We discussed adding a dose of fluoxetine, the dosage, and potential side effects. After this conversation, Piter decided to try a low-dose of fluoxetine.     He does not drink alcohol. He exercises regularly. He has been eating well and his appetite has been good.     PHQ 10/15/2020 7/27/2021 9/16/2021   PHQ-9 Total Score 4 8 2   Q9: Thoughts of better off dead/self-harm past 2 weeks Not at all Not at all Not at all     EJ-7 SCORE 10/15/2020 7/27/2021 9/16/2021   Total Score - - 2 (minimal anxiety)   Total Score 3 7 2       Gastroesophageal reflux disease without esophagitis   At Piter's visit on 10/15/21, he reported persistent GERD sx x 3 months. H pylori was negative. He was referred for upper endoscopy completed on 12/10/20, which found a Non obstructive Shatzki's ring at the LES. Gastric biopsies were taken, which came back as gastric antral and fundic type mucosa with mild chronic antral gastritis. Negative for intestinal metaplasia or dysplasia. Piter was instructed to continue his previous diet and follow up with me PRN. At his last visit on 7/21/21, he had been taking omeprazole daily and I recommended that he continue with this. He drinks tea and coffee. Denies dysphagia.    Today, Piter reports that his GERD symptoms have been \"much better\" and that he is \"in a good position right now\". He has continued to take omeprazole daily, and I encouraged him to continue doing so.     Vitals:  There were no vitals taken for this visit.      Physical Exam   GENERAL: Healthy, alert and no distress  EYES: Eyes grossly normal to inspection.  No discharge or " erythema, or obvious scleral/conjunctival abnormalities.  RESP: No audible wheeze, cough, or visible cyanosis.  No visible retractions or increased work of breathing.    SKIN: Visible skin clear. No significant rash, abnormal pigmentation or lesions.  NEURO: Cranial nerves grossly intact.  Mentation and speech appropriate for age.  PSYCH: Mentation appears normal, affect normal/bright, judgement and insight intact, normal speech and appearance well-groomed.        Video-Visit Details    Type of service:  Video Visit    Video End Time:4:09 PM    Originating Location (pt. Location): Home    Distant Location (provider location):  [facility]     Platform used for Video Visit: Trendabl  Answers for HPI/ROS submitted by the patient on 9/16/2021  If you checked off any problems, how difficult have these problems made it for you to do your work, take care of things at home, or get along with other people?: Not difficult at all  PHQ9 TOTAL SCORE: 2  EJ 7 TOTAL SCORE: 2

## 2021-09-17 DIAGNOSIS — E13.9 LADA (LATENT AUTOIMMUNE DIABETES MELLITUS IN ADULTS) (H): ICD-10-CM

## 2021-09-17 ASSESSMENT — ANXIETY QUESTIONNAIRES: GAD7 TOTAL SCORE: 2

## 2021-09-17 ASSESSMENT — PATIENT HEALTH QUESTIONNAIRE - PHQ9: SUM OF ALL RESPONSES TO PHQ QUESTIONS 1-9: 2

## 2021-09-17 NOTE — TELEPHONE ENCOUNTER
SANJANA SOLOSTAR 100UNIT/ML SOPN      Last Written Prescription Date:  11-22-20  Last Fill Quantity: 15ml,   # refills: 3  Last Office Visit : 6-23-21  Future Office visit:  -9-21-21    Routing refill request to provider for review/approval because:  Insulin - refilled per clinic

## 2021-09-20 ENCOUNTER — MYC MEDICAL ADVICE (OUTPATIENT)
Dept: ENDOCRINOLOGY | Facility: CLINIC | Age: 46
End: 2021-09-20
Payer: COMMERCIAL

## 2021-09-20 ENCOUNTER — OFFICE VISIT (OUTPATIENT)
Dept: FAMILY MEDICINE | Facility: CLINIC | Age: 46
End: 2021-09-20

## 2021-09-20 VITALS
SYSTOLIC BLOOD PRESSURE: 125 MMHG | OXYGEN SATURATION: 96 % | DIASTOLIC BLOOD PRESSURE: 80 MMHG | BODY MASS INDEX: 22.53 KG/M2 | HEIGHT: 73 IN | HEART RATE: 72 BPM | WEIGHT: 170 LBS | TEMPERATURE: 97.1 F | RESPIRATION RATE: 14 BRPM

## 2021-09-20 DIAGNOSIS — E10.65 TYPE 1 DIABETES MELLITUS WITH HYPERGLYCEMIA (H): ICD-10-CM

## 2021-09-20 DIAGNOSIS — M25.511 ACUTE PAIN OF RIGHT SHOULDER: ICD-10-CM

## 2021-09-20 DIAGNOSIS — M54.50 ACUTE BILATERAL LOW BACK PAIN WITHOUT SCIATICA: ICD-10-CM

## 2021-09-20 DIAGNOSIS — M54.2 NECK PAIN: Primary | ICD-10-CM

## 2021-09-20 RX ORDER — PREDNISONE 20 MG/1
TABLET ORAL
Qty: 6 TABLET | Refills: 0 | Status: SHIPPED | OUTPATIENT
Start: 2021-09-20 | End: 2022-02-17

## 2021-09-20 RX ORDER — CYCLOBENZAPRINE HCL 10 MG
TABLET ORAL
Qty: 14 TABLET | Refills: 0 | Status: SHIPPED | OUTPATIENT
Start: 2021-09-20 | End: 2021-09-30

## 2021-09-20 ASSESSMENT — MIFFLIN-ST. JEOR: SCORE: 1704.86
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
## 2021-09-20 NOTE — PATIENT INSTRUCTIONS
Prednisone as directed  Take with food earlier in the day    Tylenol 500mg   You may take 2 tablets up to 3 times a day  3000mg maximum      Muscle relaxant = Cyclobenzaprine 10mg at bedtime  This will make you drowsy    Warm packs, gentle stretches    Physical therapy
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
## 2021-09-20 NOTE — PROGRESS NOTES
Julius Flores CMA    Outcome for 09/20/21 11:43 AM :Glucose sent via Email    Piter is a 46 year old who is being evaluated via a billable video visit.      How would you like to obtain your AVS? MyChart  If the video visit is dropped, the invitation should be resent by: [phone]  Will anyone else be joining your video visit? No

## 2021-09-21 ENCOUNTER — TELEPHONE (OUTPATIENT)
Dept: ENDOCRINOLOGY | Facility: CLINIC | Age: 46
End: 2021-09-21

## 2021-09-21 ENCOUNTER — VIRTUAL VISIT (OUTPATIENT)
Dept: ENDOCRINOLOGY | Facility: CLINIC | Age: 46
End: 2021-09-21
Payer: COMMERCIAL

## 2021-09-21 DIAGNOSIS — E10.65 TYPE 1 DIABETES MELLITUS WITH HYPERGLYCEMIA (H): Primary | ICD-10-CM

## 2021-09-21 PROCEDURE — 99215 OFFICE O/P EST HI 40 MIN: CPT | Mod: 95 | Performed by: PHYSICIAN ASSISTANT
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
## 2021-09-21 NOTE — PATIENT INSTRUCTIONS
We appreciate your assistance in coordinating your healthcare.     Please upload your insulin pump, blood sugar meter and/or continuous glucose monitor at home 1-2 days before your next diabetes-related appointment.   This will allow your provider to review your  data before your scheduled virtual visit.    To ask a question to your Endocrine care team, please send them a NanoICE message, or reach them by phone at [phone]     To expedite your medication refill(s), please contact your pharmacy and have them   fax a refill request to: [phone].  *Please allow 3 business days for routine medication refills.  *Please allow 5 business days for controlled substance medication refills.    For after-hours urgent Endocrine issues, that do not require 111, please dial [phone], and ask to speak with the Endocrinologist On-Call

## 2021-09-21 NOTE — TELEPHONE ENCOUNTER
CLINIC COORDINATOR SCHEDULING NOTES    RESULT: LVM + MyC sent    APPT TYPE: RETURN DIABETES         PROVIDER: Brew[facility]    DATE APPT NEEDED: December 2021/next available after that
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
## 2021-09-22 ENCOUNTER — THERAPY VISIT (OUTPATIENT)
Dept: PHYSICAL THERAPY | Facility: CLINIC | Age: 46
End: 2021-09-22
Attending: INTERNAL MEDICINE
Payer: COMMERCIAL

## 2021-09-22 DIAGNOSIS — M25.511 ACUTE PAIN OF RIGHT SHOULDER: ICD-10-CM

## 2021-09-22 DIAGNOSIS — M54.50 ACUTE BILATERAL LOW BACK PAIN WITHOUT SCIATICA: ICD-10-CM

## 2021-09-22 DIAGNOSIS — M54.2 NECK PAIN: ICD-10-CM

## 2021-09-22 PROCEDURE — 97161 PT EVAL LOW COMPLEX 20 MIN: CPT | Mod: GP | Performed by: PHYSICAL THERAPIST

## 2021-09-22 PROCEDURE — 97110 THERAPEUTIC EXERCISES: CPT | Mod: GP | Performed by: PHYSICAL THERAPIST
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
## 2021-09-23 NOTE — PROGRESS NOTES
Physical Therapy Initial Evaluation  Subjective:    Patient Health History         Pain is reported as 6/10 on pain scale.  General health as reported by patient is good.  Pertinent medical history includes: diabetes.            Current medications:  Muscle relaxants and pain medication.    Current occupation is .   Primary job tasks include:  Driving.                  Oswestry Score: 12 %                 Objective:  System    Physical Exam    General     ROS    Assessment/Plan:

## 2021-09-25 ENCOUNTER — HEALTH MAINTENANCE LETTER (OUTPATIENT)
Age: 46
End: 2021-09-25

## 2021-09-27 ENCOUNTER — THERAPY VISIT (OUTPATIENT)
Dept: PHYSICAL THERAPY | Facility: CLINIC | Age: 46
End: 2021-09-27
Payer: COMMERCIAL

## 2021-09-27 DIAGNOSIS — M54.50 LOW BACK PAIN: ICD-10-CM

## 2021-09-27 PROCEDURE — 97110 THERAPEUTIC EXERCISES: CPT | Mod: GP | Performed by: PHYSICAL THERAPIST

## 2021-09-27 PROCEDURE — 97530 THERAPEUTIC ACTIVITIES: CPT | Mod: GP | Performed by: PHYSICAL THERAPIST

## 2021-09-27 NOTE — PROGRESS NOTES
Next:  Check squat depth/form and work on sitting balance/posture on ball    S:  Neck has been feeling better but still having the pain in his back. Mostly in the morning, but settles down throughout the day and then gets worse later in the day. Pain is on the lower right side of the back. Stopped doing the exercises due to pain in the back.     O:  ttp on right PSIS  Squat: good form, no significant findings

## 2021-09-30 ENCOUNTER — OFFICE VISIT (OUTPATIENT)
Dept: FAMILY MEDICINE | Facility: CLINIC | Age: 46
End: 2021-09-30
Payer: COMMERCIAL

## 2021-09-30 VITALS
SYSTOLIC BLOOD PRESSURE: 117 MMHG | HEART RATE: 97 BPM | HEIGHT: 73 IN | DIASTOLIC BLOOD PRESSURE: 81 MMHG | TEMPERATURE: 97.2 F | WEIGHT: 168.04 LBS | OXYGEN SATURATION: 96 % | BODY MASS INDEX: 22.27 KG/M2 | RESPIRATION RATE: 14 BRPM

## 2021-09-30 DIAGNOSIS — E10.8 TYPE 1 DIABETES MELLITUS WITH COMPLICATION (H): ICD-10-CM

## 2021-09-30 DIAGNOSIS — Z23 NEED FOR PROPHYLACTIC VACCINATION AND INOCULATION AGAINST INFLUENZA: ICD-10-CM

## 2021-09-30 DIAGNOSIS — M54.10 ACUTE LOW BACK PAIN WITH RADICULAR SYMPTOMS, DURATION LESS THAN 6 WEEKS: Primary | ICD-10-CM

## 2021-09-30 DIAGNOSIS — M54.2 NECK PAIN: ICD-10-CM

## 2021-09-30 DIAGNOSIS — M25.511 ACUTE PAIN OF RIGHT SHOULDER: ICD-10-CM

## 2021-09-30 DIAGNOSIS — M54.9 UPPER BACK PAIN: ICD-10-CM

## 2021-09-30 DIAGNOSIS — M54.40 ACUTE BILATERAL LOW BACK PAIN WITH SCIATICA, SCIATICA LATERALITY UNSPECIFIED: ICD-10-CM

## 2021-09-30 RX ORDER — CELECOXIB 100 MG/1
100 CAPSULE ORAL 2 TIMES DAILY
Qty: 60 CAPSULE | Refills: 1 | Status: SHIPPED | OUTPATIENT
Start: 2021-09-30 | End: 2021-12-07

## 2021-09-30 RX ORDER — CYCLOBENZAPRINE HCL 10 MG
TABLET ORAL
Qty: 14 TABLET | Refills: 0 | Status: SHIPPED | OUTPATIENT
Start: 2021-09-30 | End: 2021-12-07

## 2021-09-30 RX ORDER — LANCETS
EACH MISCELLANEOUS
Qty: 200 EACH | Refills: 3 | Status: SHIPPED | OUTPATIENT
Start: 2021-09-30

## 2021-09-30 ASSESSMENT — PAIN SCALES - GENERAL: PAINLEVEL: EXTREME PAIN (8)

## 2021-09-30 ASSESSMENT — MIFFLIN-ST. JEOR: SCORE: 1695.97
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
## 2021-09-30 NOTE — NURSING NOTE
"Injectable Influenza Immunization Documentation    1.  Has the patient received the information for the injectable influenza vaccine? YES     2. Is the patient 6 months of age or older? YES     3. Does the patient have any of the following contraindications?         Severe allergy to eggs? No     Severe allergic reaction to previous influenza vaccines? No   Severe allergy to latex? No       History of Guillain-Troy syndrome? No     Currently have a temperature greater than 100.4F? No        4.  Severely egg allergic patients should have flu vaccine eligibility assessed by an MD, RN, or pharmacist, and those who received flu vaccine should be observed for 15 min by an MD, RN, Pharmacist, Medical Technician, or member of clinic staff.\": YES    5. Latex-allergic patients should be given latex-free influenza vaccine Yes. Please reference the Vaccine latex table to determine if your clinic s product is latex-containing.       Vaccination given by Keila Kate CMA,[facility]  September 30, 2021 4:22 PM                "
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
## 2021-09-30 NOTE — PATIENT INSTRUCTIONS
Acetaminophen 500mg tablets  2 tablets up to three times a day   Maximum is 3000mg in 24 hours    Celebrex 100mg twice daily with food    Flexeril at bedtime 10mg     May take flexeril prior to the MRI scan so it is comfortable for you lie down.    Call Physical therapy group to schedule appointment 1-2 times per week  [phone]

## 2021-09-30 NOTE — TELEPHONE ENCOUNTER
9/21/2021  [facility] [place]   Sheila Brewer PA-C    Endocrinology, Diabetes, and Metabolism     lancets

## 2021-10-01 NOTE — PROGRESS NOTES
Piter is requesting influenza vaccine.      (Z23) Need for prophylactic vaccination and inoculation against influenza  Comment: Routine influenza vaccine  Plan: INFLUENZA VACCINE IM > 6 MONTHS VALENT IIV4         (AFLURIA/FLUZONE)        Given today.    Rosamaria Banerjee MD  Internal Medicine/Pediatrics

## 2021-10-05 LAB
CHOLEST SERPL-MCNC: 165 MG/DL
CREAT UR-MCNC: 22 MG/DL
FASTING STATUS PATIENT QL REPORTED: YES
HBA1C MFR BLD: 7 % (ref 0–5.6)
HDLC SERPL-MCNC: 45 MG/DL
LDLC SERPL CALC-MCNC: 107 MG/DL
MICROALBUMIN UR-MCNC: <5 MG/L
MICROALBUMIN/CREAT UR: NORMAL MG/G{CREAT}
NONHDLC SERPL-MCNC: 120 MG/DL
TRIGL SERPL-MCNC: 66 MG/DL

## 2021-10-05 PROCEDURE — 83036 HEMOGLOBIN GLYCOSYLATED A1C: CPT | Performed by: PATHOLOGY

## 2021-10-05 PROCEDURE — 80061 LIPID PANEL: CPT | Performed by: PATHOLOGY

## 2021-10-05 PROCEDURE — 36415 COLL VENOUS BLD VENIPUNCTURE: CPT | Performed by: PATHOLOGY

## 2021-10-05 PROCEDURE — 82043 UR ALBUMIN QUANTITATIVE: CPT | Mod: 90 | Performed by: PATHOLOGY

## 2021-10-08 ENCOUNTER — THERAPY VISIT (OUTPATIENT)
Dept: PHYSICAL THERAPY | Facility: CLINIC | Age: 46
End: 2021-10-08
Payer: COMMERCIAL

## 2021-10-08 DIAGNOSIS — M54.9 UPPER BACK PAIN: ICD-10-CM

## 2021-10-08 PROCEDURE — 97110 THERAPEUTIC EXERCISES: CPT | Mod: GP | Performed by: PHYSICAL THERAPIST

## 2021-10-08 PROCEDURE — 97140 MANUAL THERAPY 1/> REGIONS: CPT | Mod: GP | Performed by: PHYSICAL THERAPIST
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
## 2021-10-13 ENCOUNTER — OFFICE VISIT (OUTPATIENT)
Dept: FAMILY MEDICINE | Facility: CLINIC | Age: 46
End: 2021-10-13
Payer: COMMERCIAL

## 2021-10-13 VITALS
OXYGEN SATURATION: 96 % | WEIGHT: 170.5 LBS | HEART RATE: 97 BPM | DIASTOLIC BLOOD PRESSURE: 77 MMHG | TEMPERATURE: 97.2 F | RESPIRATION RATE: 15 BRPM | BODY MASS INDEX: 22.6 KG/M2 | SYSTOLIC BLOOD PRESSURE: 123 MMHG | HEIGHT: 73 IN

## 2021-10-13 DIAGNOSIS — M25.511 ACUTE PAIN OF RIGHT SHOULDER: ICD-10-CM

## 2021-10-13 DIAGNOSIS — M54.10 ACUTE LOW BACK PAIN WITH RADICULAR SYMPTOMS, DURATION LESS THAN 6 WEEKS: Primary | ICD-10-CM

## 2021-10-13 DIAGNOSIS — M54.9 UPPER BACK PAIN: ICD-10-CM

## 2021-10-13 DIAGNOSIS — M54.2 NECK PAIN: ICD-10-CM

## 2021-10-13 ASSESSMENT — MIFFLIN-ST. JEOR: SCORE: 1699.32
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
## 2021-10-14 ENCOUNTER — HOSPITAL ENCOUNTER (OUTPATIENT)
Dept: MRI IMAGING | Facility: CLINIC | Age: 46
Discharge: HOME | End: 2021-10-14
Attending: INTERNAL MEDICINE | Admitting: INTERNAL MEDICINE
Payer: COMMERCIAL

## 2021-10-14 DIAGNOSIS — M54.10 ACUTE LOW BACK PAIN WITH RADICULAR SYMPTOMS, DURATION LESS THAN 6 WEEKS: ICD-10-CM

## 2021-10-14 PROCEDURE — 72148 MRI LUMBAR SPINE W/O DYE: CPT | Mod: 26 | Performed by: STUDENT IN AN ORGANIZED HEALTH CARE EDUCATION/TRAINING PROGRAM

## 2021-10-14 PROCEDURE — 72148 MRI LUMBAR SPINE W/O DYE: CPT
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
## 2021-10-19 DIAGNOSIS — M25.511 RIGHT SHOULDER PAIN: Primary | ICD-10-CM

## 2021-10-22 ENCOUNTER — THERAPY VISIT (OUTPATIENT)
Dept: PHYSICAL THERAPY | Facility: CLINIC | Age: 46
End: 2021-10-22
Payer: COMMERCIAL

## 2021-10-22 DIAGNOSIS — M25.511 ACUTE PAIN OF RIGHT SHOULDER: ICD-10-CM

## 2021-10-22 DIAGNOSIS — M54.9 UPPER BACK PAIN: ICD-10-CM

## 2021-10-22 DIAGNOSIS — M54.2 NECK PAIN: ICD-10-CM

## 2021-10-22 DIAGNOSIS — M54.10 ACUTE LOW BACK PAIN WITH RADICULAR SYMPTOMS, DURATION LESS THAN 6 WEEKS: ICD-10-CM

## 2021-10-22 PROCEDURE — 97140 MANUAL THERAPY 1/> REGIONS: CPT | Mod: GP | Performed by: PHYSICAL THERAPIST

## 2021-10-22 PROCEDURE — 97110 THERAPEUTIC EXERCISES: CPT | Mod: GP | Performed by: PHYSICAL THERAPIST

## 2021-10-25 ENCOUNTER — OFFICE VISIT (OUTPATIENT)
Dept: ORTHOPEDICS | Facility: CLINIC | Age: 46
End: 2021-10-25
Attending: INTERNAL MEDICINE
Payer: COMMERCIAL

## 2021-10-25 ENCOUNTER — PRE VISIT (OUTPATIENT)
Dept: ORTHOPEDICS | Facility: CLINIC | Age: 46
End: 2021-10-25

## 2021-10-25 ENCOUNTER — ANCILLARY PROCEDURE (OUTPATIENT)
Dept: GENERAL RADIOLOGY | Facility: CLINIC | Age: 46
End: 2021-10-25
Payer: COMMERCIAL

## 2021-10-25 VITALS — WEIGHT: 170 LBS | BODY MASS INDEX: 22.53 KG/M2 | HEIGHT: 73 IN

## 2021-10-25 DIAGNOSIS — M25.511 ACUTE PAIN OF RIGHT SHOULDER: ICD-10-CM

## 2021-10-25 DIAGNOSIS — M25.511 RIGHT SHOULDER PAIN: ICD-10-CM

## 2021-10-25 PROCEDURE — 73030 X-RAY EXAM OF SHOULDER: CPT | Mod: RT | Performed by: RADIOLOGY

## 2021-10-25 PROCEDURE — 99203 OFFICE O/P NEW LOW 30 MIN: CPT | Performed by: STUDENT IN AN ORGANIZED HEALTH CARE EDUCATION/TRAINING PROGRAM

## 2021-10-25 ASSESSMENT — MIFFLIN-ST. JEOR: SCORE: 1697.05
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
## 2021-10-27 ENCOUNTER — THERAPY VISIT (OUTPATIENT)
Dept: PHYSICAL THERAPY | Facility: CLINIC | Age: 46
End: 2021-10-27
Payer: COMMERCIAL

## 2021-10-27 DIAGNOSIS — M54.9 UPPER BACK PAIN: ICD-10-CM

## 2021-10-27 PROCEDURE — 97530 THERAPEUTIC ACTIVITIES: CPT | Mod: GP | Performed by: PHYSICAL THERAPIST

## 2021-10-27 PROCEDURE — 97140 MANUAL THERAPY 1/> REGIONS: CPT | Mod: GP | Performed by: PHYSICAL THERAPIST

## 2021-10-27 PROCEDURE — 97110 THERAPEUTIC EXERCISES: CPT | Mod: GP | Performed by: PHYSICAL THERAPIST

## 2021-10-30 DIAGNOSIS — E13.9 LADA (LATENT AUTOIMMUNE DIABETES MELLITUS IN ADULTS) (H): ICD-10-CM

## 2021-10-31 RX ORDER — PEN NEEDLE, DIABETIC 32GX 5/32"
NEEDLE, DISPOSABLE MISCELLANEOUS
Qty: 400 EACH | Refills: 3 | Status: SHIPPED | OUTPATIENT
Start: 2021-10-31 | End: 2022-12-02

## 2021-10-31 NOTE — TELEPHONE ENCOUNTER
Pen needle    9/21/2021  [facility] [place]     Sheila Brewer PA-C    Endocrinology, Diabetes, and Metabolism

## 2021-11-16 ENCOUNTER — THERAPY VISIT (OUTPATIENT)
Dept: PHYSICAL THERAPY | Facility: CLINIC | Age: 46
End: 2021-11-16
Payer: COMMERCIAL

## 2021-11-16 DIAGNOSIS — S46.001A ROTATOR CUFF INJURY, RIGHT, INITIAL ENCOUNTER: ICD-10-CM

## 2021-11-16 PROCEDURE — 97110 THERAPEUTIC EXERCISES: CPT | Mod: GP | Performed by: PHYSICAL THERAPIST

## 2021-11-16 PROCEDURE — 97161 PT EVAL LOW COMPLEX 20 MIN: CPT | Mod: GP | Performed by: PHYSICAL THERAPIST
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
## 2021-11-22 ENCOUNTER — THERAPY VISIT (OUTPATIENT)
Dept: PHYSICAL THERAPY | Facility: CLINIC | Age: 46
End: 2021-11-22
Payer: COMMERCIAL

## 2021-11-22 DIAGNOSIS — S46.001A ROTATOR CUFF INJURY, RIGHT, INITIAL ENCOUNTER: ICD-10-CM

## 2021-11-22 PROCEDURE — 97110 THERAPEUTIC EXERCISES: CPT | Mod: GP | Performed by: PHYSICAL THERAPIST

## 2021-11-22 PROCEDURE — 97140 MANUAL THERAPY 1/> REGIONS: CPT | Mod: GP | Performed by: PHYSICAL THERAPIST

## 2021-12-03 ENCOUNTER — THERAPY VISIT (OUTPATIENT)
Dept: PHYSICAL THERAPY | Facility: CLINIC | Age: 46
End: 2021-12-03
Payer: COMMERCIAL

## 2021-12-03 DIAGNOSIS — S46.001A ROTATOR CUFF INJURY, RIGHT, INITIAL ENCOUNTER: ICD-10-CM

## 2021-12-03 PROCEDURE — 97110 THERAPEUTIC EXERCISES: CPT | Mod: GP | Performed by: PHYSICAL THERAPIST

## 2021-12-03 PROCEDURE — 97530 THERAPEUTIC ACTIVITIES: CPT | Mod: GP | Performed by: PHYSICAL THERAPIST

## 2021-12-03 NOTE — PROGRESS NOTES
S:  Pt reports overall shoulder and neck are doing well.  Some low back soreness.  Was able to play soccer this morning.  O: trunk ROM is full and painfree  Fluid gait and no struggles with transitional movements  Cervical AROM is full and painfree  A:  Overall good progress with POC.  Pt is independent with a HEP to further address sxs and is a good candidate for discharge at this time.  P: cont self directed

## 2021-12-07 ENCOUNTER — VIRTUAL VISIT (OUTPATIENT)
Dept: FAMILY MEDICINE | Facility: CLINIC | Age: 46
End: 2021-12-07
Payer: COMMERCIAL

## 2021-12-07 DIAGNOSIS — G89.29 CHRONIC RIGHT SHOULDER PAIN: ICD-10-CM

## 2021-12-07 DIAGNOSIS — M25.511 CHRONIC RIGHT SHOULDER PAIN: ICD-10-CM

## 2021-12-07 DIAGNOSIS — M54.42 CHRONIC BILATERAL LOW BACK PAIN WITH LEFT-SIDED SCIATICA: ICD-10-CM

## 2021-12-07 DIAGNOSIS — M54.2 CERVICALGIA: Primary | ICD-10-CM

## 2021-12-07 DIAGNOSIS — G89.29 CHRONIC BILATERAL LOW BACK PAIN WITH LEFT-SIDED SCIATICA: ICD-10-CM
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
## 2021-12-13 ENCOUNTER — OFFICE VISIT (OUTPATIENT)
Dept: ORTHOPEDICS | Facility: CLINIC | Age: 46
End: 2021-12-13
Payer: COMMERCIAL

## 2021-12-13 VITALS — BODY MASS INDEX: 23.03 KG/M2 | HEIGHT: 72 IN | WEIGHT: 170 LBS

## 2021-12-13 DIAGNOSIS — M67.911 TENDINOPATHY OF ROTATOR CUFF, RIGHT: Primary | ICD-10-CM

## 2021-12-13 DIAGNOSIS — S46.311A STRAIN OF RIGHT TRICEPS, INITIAL ENCOUNTER: ICD-10-CM

## 2021-12-13 DIAGNOSIS — M25.511 ACUTE PAIN OF RIGHT SHOULDER: ICD-10-CM

## 2021-12-13 PROCEDURE — 99213 OFFICE O/P EST LOW 20 MIN: CPT | Performed by: STUDENT IN AN ORGANIZED HEALTH CARE EDUCATION/TRAINING PROGRAM

## 2021-12-13 ASSESSMENT — MIFFLIN-ST. JEOR: SCORE: 1689.11
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
## 2021-12-14 ENCOUNTER — ANCILLARY PROCEDURE (OUTPATIENT)
Dept: MRI IMAGING | Facility: CLINIC | Age: 46
End: 2021-12-14
Attending: STUDENT IN AN ORGANIZED HEALTH CARE EDUCATION/TRAINING PROGRAM
Payer: COMMERCIAL

## 2021-12-14 ENCOUNTER — ANCILLARY PROCEDURE (OUTPATIENT)
Dept: MRI IMAGING | Facility: CLINIC | Age: 46
End: 2021-12-14
Attending: INTERNAL MEDICINE
Payer: COMMERCIAL

## 2021-12-14 DIAGNOSIS — M54.2 CERVICALGIA: ICD-10-CM

## 2021-12-14 DIAGNOSIS — M67.911 TENDINOPATHY OF ROTATOR CUFF, RIGHT: ICD-10-CM

## 2021-12-14 DIAGNOSIS — M25.511 ACUTE PAIN OF RIGHT SHOULDER: ICD-10-CM

## 2021-12-14 PROCEDURE — 73221 MRI JOINT UPR EXTREM W/O DYE: CPT | Mod: RT | Performed by: RADIOLOGY

## 2021-12-14 PROCEDURE — 72141 MRI NECK SPINE W/O DYE: CPT | Mod: GC | Performed by: RADIOLOGY
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
## 2021-12-16 ENCOUNTER — TELEPHONE (OUTPATIENT)
Dept: ORTHOPEDICS | Facility: CLINIC | Age: 46
End: 2021-12-16

## 2021-12-16 ENCOUNTER — VIRTUAL VISIT (OUTPATIENT)
Dept: ORTHOPEDICS | Facility: CLINIC | Age: 46
End: 2021-12-16
Payer: COMMERCIAL

## 2021-12-16 DIAGNOSIS — M67.911 TENDINOPATHY OF ROTATOR CUFF, RIGHT: ICD-10-CM

## 2021-12-16 DIAGNOSIS — S43.431D GLENOID LABRAL TEAR, RIGHT, SUBSEQUENT ENCOUNTER: ICD-10-CM

## 2021-12-16 DIAGNOSIS — M19.011 OSTEOARTHRITIS OF RIGHT AC (ACROMIOCLAVICULAR) JOINT: Primary | ICD-10-CM

## 2021-12-16 PROCEDURE — 99442 PR PHYSICIAN TELEPHONE EVALUATION 11-20 MIN: CPT | Mod: 95 | Performed by: STUDENT IN AN ORGANIZED HEALTH CARE EDUCATION/TRAINING PROGRAM
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
## 2021-12-16 NOTE — TELEPHONE ENCOUNTER
Myself and Dr. Ab Madrigal attempted to reach the patient several times for his telephone visit scheduled today. A call back number was given to reschedule appointment.     NAVIN Cotton
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
## 2022-01-07 DIAGNOSIS — E10.8 TYPE 1 DIABETES MELLITUS WITH COMPLICATION (H): ICD-10-CM

## 2022-01-08 RX ORDER — BLOOD SUGAR DIAGNOSTIC
STRIP MISCELLANEOUS
Qty: 200 STRIP | Refills: 2 | Status: SHIPPED | OUTPATIENT
Start: 2022-01-08

## 2022-01-08 NOTE — TELEPHONE ENCOUNTER
Test strips    9/21/2021  [facility] Sheila Chavez PA-C    Endocrinology, Diabetes, and Metabolism

## 2022-01-15 ENCOUNTER — HEALTH MAINTENANCE LETTER (OUTPATIENT)
Age: 47
End: 2022-01-15

## 2022-01-17 PROBLEM — S46.001A ROTATOR CUFF INJURY, RIGHT, INITIAL ENCOUNTER: Status: RESOLVED | Noted: 2021-11-16 | Resolved: 2022-01-17

## 2022-02-15 NOTE — PROGRESS NOTES
Outcome for 02/15/22 4:58 PM: Sent invite to connect to clinic for sharing   Outcome for 02/15/22 4:59 PM: geoladt message sent  Outcome for 02/16/22 9:44 AM: Left Voicemail   Outcome for 02/16/22 3:44 PM: Data obtained via phone and located below      02/16/2022  Breakfast: 121  Lunch: 111  Dinner: 150    02/15/2022  Breakfast: 156  Lunch: 157  Dinner: 160    02/14/2022  Breakfast: 163  Lunch: 139  Dinner: 163    02/13/2021  Breakfast: 143  Lunch: 140  Dinner: 131    02/12/2022  Breakfast: 162  Lunch: 131  Dinner: 74    02/11/2022  Breakfast: 124  Lunch: 180  Dinner: 110    02/10/2022  Breakfast: 83  Lunch: 187  Dinner: 149    02/09/2022  Breakfast: 177  Lunch: 140  Dinner: 144    02/08/2022  Breakfast: 110  Lunch: 134  Dinner: 144    02/07/2022  Breakfast: 193  Lunch: 145  Dinner: 94

## 2022-02-16 ASSESSMENT — ENCOUNTER SYMPTOMS: BACK PAIN: 1

## 2022-02-17 ENCOUNTER — TELEPHONE (OUTPATIENT)
Dept: ENDOCRINOLOGY | Facility: CLINIC | Age: 47
End: 2022-02-17

## 2022-02-17 ENCOUNTER — VIRTUAL VISIT (OUTPATIENT)
Dept: ENDOCRINOLOGY | Facility: CLINIC | Age: 47
End: 2022-02-17
Payer: COMMERCIAL

## 2022-02-17 DIAGNOSIS — E10.65 TYPE 1 DIABETES MELLITUS WITH HYPERGLYCEMIA (H): Primary | ICD-10-CM

## 2022-02-17 PROCEDURE — 99215 OFFICE O/P EST HI 40 MIN: CPT | Mod: 95 | Performed by: PHYSICIAN ASSISTANT

## 2022-02-17 NOTE — PROGRESS NOTES
Piter is a 47 year old who is being evaluated via a billable video visit.      How would you like to obtain your AVS? MyChart  If the video visit is dropped, the invitation should be resent by: Text to cell phone: [phone]   Will anyone else be joining your video visit? No
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
## 2022-02-25 ENCOUNTER — LAB (OUTPATIENT)
Dept: LAB | Facility: CLINIC | Age: 47
End: 2022-02-25
Payer: COMMERCIAL

## 2022-02-25 DIAGNOSIS — E10.65 TYPE 1 DIABETES MELLITUS WITH HYPERGLYCEMIA (H): ICD-10-CM

## 2022-02-25 LAB — HBA1C MFR BLD: 6.7 % (ref 0–5.6)

## 2022-02-25 PROCEDURE — 36415 COLL VENOUS BLD VENIPUNCTURE: CPT

## 2022-02-25 PROCEDURE — 83036 HEMOGLOBIN GLYCOSYLATED A1C: CPT

## 2022-04-14 ENCOUNTER — TRANSFERRED RECORDS (OUTPATIENT)
Dept: HEALTH INFORMATION MANAGEMENT | Facility: CLINIC | Age: 47
End: 2022-04-14
Payer: COMMERCIAL

## 2022-06-02 DIAGNOSIS — K21.9 GASTROESOPHAGEAL REFLUX DISEASE WITHOUT ESOPHAGITIS: ICD-10-CM

## 2022-06-02 NOTE — TELEPHONE ENCOUNTER
Last time prescribed: 9/16/21 , 90 caps x 1 refills  Last office visit: 12/7/21  Next appointment: No future appointments

## 2022-06-03 NOTE — TELEPHONE ENCOUNTER
Medication requested: omeprazole (PRILOSEC) 20 MG DR capsule  Last office visit: 12/7/21; virtual  Future [facility] appointments: none  Medication last refilled: 9/16/21; 90 + 1 refill  Last qualifying labs: N/A    Prescription approved per [facility] Refill Protocol.    Henri ANDERSON, RN  06/03/22 2:03 PM

## 2022-06-21 DIAGNOSIS — J30.2 SEASONAL ALLERGIC RHINITIS: ICD-10-CM

## 2022-06-21 RX ORDER — FLUTICASONE PROPIONATE 50 MCG
2 SPRAY, SUSPENSION (ML) NASAL DAILY
Qty: 16 G | Refills: 3 | Status: SHIPPED | OUTPATIENT
Start: 2022-06-21 | End: 2024-03-20

## 2022-06-21 NOTE — TELEPHONE ENCOUNTER
Last time prescribed: 4/16/21 , 16 g/tabs/caps x 3 refills  Last office visit: 12/7/21  Next appointment: No Future Appointment Scheduled    Prescription approved per [facility] Refill Protocol.    Ngoc Zamarripa RN  June 21, 2022 9:59 AM

## 2022-08-30 ENCOUNTER — TRANSFERRED RECORDS (OUTPATIENT)
Dept: HEALTH INFORMATION MANAGEMENT | Facility: CLINIC | Age: 47
End: 2022-08-30

## 2022-09-08 NOTE — PROGRESS NOTES
Outcome for 09/08/22 1:47 PM: Leadspacet message sent  Azalia Vernon,   Outcome for 09/13/22 2:33 PM: Data obtained via phone and located below  Alana Nava,      09/13/2022  175  09/12/2022  96, 191, 128  09/11/2022  167, 120, 179  09/10/2022  123, 162, 100  09/09/2022  93, 227, 145  09/08/2022  114, 238, 160  09/07/2022  192, 166, 204  09/06/2022  150, 226, 141  09/05/2022  69, 165, 198  09/04/2022  125, 184, 101  09/03/2022  135, 181, 172  09/02/2022  155, 111, 217  09/01/2022  109, 127, 188

## 2022-09-15 ENCOUNTER — VIRTUAL VISIT (OUTPATIENT)
Dept: ENDOCRINOLOGY | Facility: CLINIC | Age: 47
End: 2022-09-15
Payer: COMMERCIAL

## 2022-09-15 DIAGNOSIS — E13.9 LADA (LATENT AUTOIMMUNE DIABETES MELLITUS IN ADULTS) (H): ICD-10-CM

## 2022-09-15 DIAGNOSIS — E10.65 TYPE 1 DIABETES MELLITUS WITH HYPERGLYCEMIA (H): Primary | ICD-10-CM

## 2022-09-15 PROCEDURE — 99215 OFFICE O/P EST HI 40 MIN: CPT | Mod: 95 | Performed by: PHYSICIAN ASSISTANT

## 2022-09-15 RX ORDER — INSULIN GLARGINE 100 [IU]/ML
INJECTION, SOLUTION SUBCUTANEOUS
Qty: 15 ML | Refills: 3 | COMMUNITY
Start: 2022-09-15 | End: 2022-11-13

## 2022-09-15 NOTE — LETTER
9/15/2022       RE: Piter Conway  [address]  [facility] [address]     Dear Colleague,    Thank you for referring your patient, Piter Conway, to the [facility] ENDOCRINOLOGY CLINIC [place] at [facility]. Please see a copy of my visit note below.    Outcome for 09/08/22 1:47 PM: Baydin message sent  Azalia Saulo, WILI  Outcome for 09/13/22 2:33 PM: Data obtained via phone and located below  WILI Faustin     09/13/2022  175  09/12/2022  96, 191, 128  09/11/2022  167, 120, 179  09/10/2022  123, 162, 100  09/09/2022  93, 227, 145  09/08/2022  114, 238, 160  09/07/2022  192, 166, 204  09/06/2022  150, 226, 141  09/05/2022  69, 165, 198  09/04/2022  125, 184, 101  09/03/2022  135, 181, 172  09/02/2022  155, 111, 217  09/01/2022  109, 127, 188      Due to the COVID 19 pandemic this visit was converted to a video visit in order to help prevent spread of infection in this patient and the general population.    Time of start: 8:28 am  Time of end: 8:45 am  Total duration of video visit: 17 minutes.    FLORIDALMA Conway is a 47 year old thin male with type 1 diabetes mellitus.  Video visit today for diabetes follow up.  Pt was started on insulin in Nov 2019.    His EJ was + with C-peptide 0.8 ng/mL.  Piter reports having mild numbness in both feet with has improved. He has no hx of retinopathy or nephropathy.  For his diabetes, he is currently taking Lantus 14 units subcutaneous each am and Novolog 1 unit/15 gms CHO with meals with correction insulin.  Pt's A1C was 6.7 % on 2/25/2022. Previous A1C was 7.0 % on 10/5/2021.   His A1C was 10.6 % in 2014 time of diabetes diagnosis.  Piter continues to use his Freestyle Libre2 sensor. He does not have a computer at home, so he is unable to upload his Freestyle data.  He provided me with blood sugar readings which I have scanned in his note below.  Few high blood sugars.  No frequent hypoglycemia.  On ROS  today, less numbness in feet. Denies foot ulcers.  He has gained weight since using insulin.  Pt denies frequent headaches,blurred vision, n/v, SOB,cough, fever, chills, chest pain,abd pain, diarrhea,dysuria or hematuria.    Diabetes Care  Retinopathy:none; he was seen by Oph in July 2021 without retinopathy. Referral for Oph exam placed today.  Nephropathy:none; pt's urine microalbuminuria negative in 10/2021.  Neuropathy: mild numbness in both feet.  Foot Exam:no exam today.  Taking aspirin:no  Lipids:  in 10/2021.  CAD: no.  Mental health: dx of adjustment disorder with mixed anxiety and depressed mood during the COVID pandemic which has improved per patient.  Insulin: Basal and meal time insulin with correction scale.  Testing: Freestyle Libre2 sensor.         ROS  Please see under HPI.    Allergies  No Known Allergies    Medications  Current Outpatient Medications   Medication Sig Dispense Refill     ACCU-CHEK GUIDE test strip TEST BLOOD SUGAR TWO TIMES A DAY OR AS DIRECTED 200 strip 2     blood glucose calibration (NO BRAND SPECIFIED) solution To accompany: whatever is covered by insurance test weekly 1 Bottle 3     blood glucose monitoring (ACCU-CHEK FASTCLIX) lancets TEST TWO TIMES A  each 3     Continuous Blood Gluc Sensor (FREESTYLE NAVEED 2 SENSOR) MISC 1 each every 14 days 1 each every 14 days. Change every 14 days. 2 each 5     DiphenhydrAMINE HCl (BENADRYL ALLERGY PO) Take 1 tablet by mouth daily       fluticasone (FLONASE) 50 MCG/ACT nasal spray Spray 2 sprays into both nostrils daily 16 g 3     hydrOXYzine (ATARAX) 25 MG tablet Take one po TID 90 tablet 1     insulin aspart (NOVOLOG FLEXPEN) 100 UNIT/ML pen INJECT 1 UNIT UNDER THE SKIN PER 15 GRAMS OF CARB PLUS 1 UNIT PER 50 ABOVE 150 (MAX DOSE 50 UNITS) 45 mL 3     insulin glargine (LANTUS SOLOSTAR) 100 UNIT/ML pen INJECT 14 UNITS UNDER THE SKIN EVERY MORNING 15 mL 3     insulin pen needle (BD MICH U/F) 32G X 4 MM miscellaneous USE 4  NEEDLES DAILY OR AS DIRECTED. 400 each 3     omeprazole (PRILOSEC) 20 MG DR capsule Take 20 mg po daily 90 capsule 1     polyethylene glycol (MIRALAX) powder Take 17 g (1 capful) by mouth 2 times daily as needed for constipation 510 g 1     urea (GORMEL) 20 % external cream Apply topically daily To feet. 120 g 5       Family History  family history includes Aortic stenosis in his father; Diabetes in his father; Heart Disease (age of onset: 75) in his father; Heart Failure in his father; Hypertension in his father.  He also has a younger brother with type 1 diabetes using an insulin pump.    Social History  Smoke: none.  ETOH: none.    Past Medical History    1. SAMANTA/DM 1 - dx 2014.  Past Medical History:   Diagnosis Date     Diabetes (H) 2014     History of hepatitis A 2001   Adjustment disorder with mixed anxiety and depressed mood.    Physical Exam    No exam.      RESULTS  Creatinine   Date Value Ref Range Status   11/28/2020 0.89 0.66 - 1.25 mg/dL Final     GFR Estimate   Date Value Ref Range Status   11/28/2020 >90 >60 mL/min/[1.73_m2] Final     Comment:     Non  GFR Calc  Starting 12/18/2018, serum creatinine based estimated GFR (eGFR) will be   calculated using the Chronic Kidney Disease Epidemiology Collaboration   (CKD-EPI) equation.       Hemoglobin A1C   Date Value Ref Range Status   02/25/2022 6.7 (H) 0.0 - 5.6 % Final     Comment:     Normal <5.7%   Prediabetes 5.7-6.4%    Diabetes 6.5% or higher     Note: Adopted from ADA consensus guidelines.   06/30/2021 7.3 (H) 0 - 5.6 % Final     Comment:     Normal <5.7% Prediabetes 5.7-6.4%  Diabetes 6.5% or higher - adopted from ADA   consensus guidelines.       Potassium   Date Value Ref Range Status   11/28/2020 4.5 3.4 - 5.3 mmol/L Final     ALT   Date Value Ref Range Status   11/28/2020 49 0 - 70 U/L Final     AST   Date Value Ref Range Status   11/28/2020 11 0 - 45 U/L Final     TSH   Date Value Ref Range Status   06/30/2021 1.19 0.40 - 4.00  mU/L Final     T4 Free   Date Value Ref Range Status   12/11/2017 1.12 0.76 - 1.46 ng/dL Final       Cholesterol   Date Value Ref Range Status   10/05/2021 165 <200 mg/dL Final   11/28/2020 159 <200 mg/dL Final   09/17/2020 154.0 0.0 - 200.0 Final     HDL Cholesterol   Date Value Ref Range Status   11/28/2020 43 >39 mg/dL Final   09/17/2020 46.0 >40.0 Final     Direct Measure HDL   Date Value Ref Range Status   10/05/2021 45 >=40 mg/dL Final     LDL Cholesterol Calculated   Date Value Ref Range Status   10/05/2021 107 (H) <=100 mg/dL Final   11/28/2020 103 (H) <100 mg/dL Final     Comment:     Above desirable:  100-129 mg/dl  Borderline High:  130-159 mg/dL  High:             160-189 mg/dL  Very high:       >189 mg/dl     02/27/2020 82 <100 mg/dL Final     Comment:     Desirable:       <100 mg/dl     LDL Cholesterol Direct   Date Value Ref Range Status   09/17/2020 86.0 0.0 - 129.0 Final     Triglycerides   Date Value Ref Range Status   10/05/2021 66 <150 mg/dL Final   11/28/2020 68 <150 mg/dL Final   09/17/2020 107.0 0.0 - 150.0 Final     Cholesterol/HDL Ratio   Date Value Ref Range Status   09/17/2020 3.3 0.0 - 5.0 Final   08/27/2015 3.0 0.0 - 5.0 Final       ASSESSMENT/PLAN:    1.  TYPE 1 DIABETES MELLITUS:  Piter had a + EJ antibody and C-peptide 0.8 ng/mL with glucose 144 in Nov 2019.  He started taking insulin in Nov 2019 and he is doing much better and has gained weight.  His most recent A1C was 6.7 % on 2/25/2022.  Blood sugar values are higher intermittently.   I had him increase Lantus 15 units subcutaneous each am and continue current Novolog I/C ratio and CF.  He is to continue to monitor his blood sugar using his Freestyle Libre2 sensor.  Pt's urine microalbuminuria was negative in Oct 2021.  Piter was seen by Oph here in July 2021 without retinopathy. Oph referral placed today for annual diabetic eye exam.  He reports less numbness in both feet.  Pt's LDL was 107 in Oct 2021. He has worked on reducing  fast in his diet.  No vitals today.  Reminded pt to get the flu vaccine this season.    2. FOLLOW UP: with me in 6 months.  Pt has my contact number to call if he has any questions.  I placed an order for an A1C and annual fasting diabetic labs which pt will have done tomorrow.    Time spent reviewing chart, labs and Freestyle Libre2 sensor download data  today = 8 minutes.  Time for telephone visit today =17  minutes.  Time for documentation today = 15 minutes.    TOTAL TIME FOR VISIT TODAY = 40 minutes.    Sheila CHENG Said is being evaluated via a billable video visit.      How would you like to obtain your AVS? Shave Clubhart  For the video visit, send the invitation by: Text to cell phone: [phone]  Will anyone else be joining your video visit? No

## 2022-09-15 NOTE — PROGRESS NOTES
Due to the COVID 19 pandemic this visit was converted to a video visit in order to help prevent spread of infection in this patient and the general population.    Time of start: 8:28 am  Time of end: 8:45 am  Total duration of video visit: 17 minutes.    HPI  Piter Conway is a 47 year old thin male with type 1 diabetes mellitus.  Video visit today for diabetes follow up.  Pt was started on insulin in Nov 2019.    His EJ was + with C-peptide 0.8 ng/mL.  Piter reports having mild numbness in both feet with has improved. He has no hx of retinopathy or nephropathy.  For his diabetes, he is currently taking Lantus 14 units subcutaneous each am and Novolog 1 unit/15 gms CHO with meals with correction insulin.  Pt's A1C was 6.7 % on 2/25/2022. Previous A1C was 7.0 % on 10/5/2021.   His A1C was 10.6 % in 2014 time of diabetes diagnosis.  Piter continues to use his Freestyle Libre2 sensor. He does not have a computer at home, so he is unable to upload his Freestyle data.  He provided me with blood sugar readings which I have scanned in his note below.  Few high blood sugars.  No frequent hypoglycemia.  On ROS today, less numbness in feet. Denies foot ulcers.  He has gained weight since using insulin.  Pt denies frequent headaches,blurred vision, n/v, SOB,cough, fever, chills, chest pain,abd pain, diarrhea,dysuria or hematuria.    Diabetes Care  Retinopathy:none; he was seen by Oph in July 2021 without retinopathy. Referral for Oph exam placed today.  Nephropathy:none; pt's urine microalbuminuria negative in 10/2021.  Neuropathy: mild numbness in both feet.  Foot Exam:no exam today.  Taking aspirin:no  Lipids:  in 10/2021.  CAD: no.  Mental health: dx of adjustment disorder with mixed anxiety and depressed mood during the COVID pandemic which has improved per patient.  Insulin: Basal and meal time insulin with correction scale.  Testing: Freestyle Libre2 sensor.         ROS  Please see under HPI.    Allergies  No Known  Allergies    Medications  Current Outpatient Medications   Medication Sig Dispense Refill     ACCU-CHEK GUIDE test strip TEST BLOOD SUGAR TWO TIMES A DAY OR AS DIRECTED 200 strip 2     blood glucose calibration (NO BRAND SPECIFIED) solution To accompany: whatever is covered by insurance test weekly 1 Bottle 3     blood glucose monitoring (ACCU-CHEK FASTCLIX) lancets TEST TWO TIMES A  each 3     Continuous Blood Gluc Sensor (FREESTYLE NAVEED 2 SENSOR) MISC 1 each every 14 days 1 each every 14 days. Change every 14 days. 2 each 5     DiphenhydrAMINE HCl (BENADRYL ALLERGY PO) Take 1 tablet by mouth daily       fluticasone (FLONASE) 50 MCG/ACT nasal spray Spray 2 sprays into both nostrils daily 16 g 3     hydrOXYzine (ATARAX) 25 MG tablet Take one po TID 90 tablet 1     insulin aspart (NOVOLOG FLEXPEN) 100 UNIT/ML pen INJECT 1 UNIT UNDER THE SKIN PER 15 GRAMS OF CARB PLUS 1 UNIT PER 50 ABOVE 150 (MAX DOSE 50 UNITS) 45 mL 3     insulin glargine (LANTUS SOLOSTAR) 100 UNIT/ML pen INJECT 14 UNITS UNDER THE SKIN EVERY MORNING 15 mL 3     insulin pen needle (BD MICH U/F) 32G X 4 MM miscellaneous USE 4 NEEDLES DAILY OR AS DIRECTED. 400 each 3     omeprazole (PRILOSEC) 20 MG DR capsule Take 20 mg po daily 90 capsule 1     polyethylene glycol (MIRALAX) powder Take 17 g (1 capful) by mouth 2 times daily as needed for constipation 510 g 1     urea (GORMEL) 20 % external cream Apply topically daily To feet. 120 g 5       Family History  family history includes Aortic stenosis in his father; Diabetes in his father; Heart Disease (age of onset: 75) in his father; Heart Failure in his father; Hypertension in his father.  He also has a younger brother with type 1 diabetes using an insulin pump.    Social History  Smoke: none.  ETOH: none.    Past Medical History    1. SAMANTA/DM 1 - dx 2014.  Past Medical History:   Diagnosis Date     Diabetes (H) 2014     History of hepatitis A 2001   Adjustment disorder with mixed anxiety and  depressed mood.    Physical Exam    No exam.      RESULTS  Creatinine   Date Value Ref Range Status   11/28/2020 0.89 0.66 - 1.25 mg/dL Final     GFR Estimate   Date Value Ref Range Status   11/28/2020 >90 >60 mL/min/[1.73_m2] Final     Comment:     Non  GFR Calc  Starting 12/18/2018, serum creatinine based estimated GFR (eGFR) will be   calculated using the Chronic Kidney Disease Epidemiology Collaboration   (CKD-EPI) equation.       Hemoglobin A1C   Date Value Ref Range Status   02/25/2022 6.7 (H) 0.0 - 5.6 % Final     Comment:     Normal <5.7%   Prediabetes 5.7-6.4%    Diabetes 6.5% or higher     Note: Adopted from ADA consensus guidelines.   06/30/2021 7.3 (H) 0 - 5.6 % Final     Comment:     Normal <5.7% Prediabetes 5.7-6.4%  Diabetes 6.5% or higher - adopted from ADA   consensus guidelines.       Potassium   Date Value Ref Range Status   11/28/2020 4.5 3.4 - 5.3 mmol/L Final     ALT   Date Value Ref Range Status   11/28/2020 49 0 - 70 U/L Final     AST   Date Value Ref Range Status   11/28/2020 11 0 - 45 U/L Final     TSH   Date Value Ref Range Status   06/30/2021 1.19 0.40 - 4.00 mU/L Final     T4 Free   Date Value Ref Range Status   12/11/2017 1.12 0.76 - 1.46 ng/dL Final       Cholesterol   Date Value Ref Range Status   10/05/2021 165 <200 mg/dL Final   11/28/2020 159 <200 mg/dL Final   09/17/2020 154.0 0.0 - 200.0 Final     HDL Cholesterol   Date Value Ref Range Status   11/28/2020 43 >39 mg/dL Final   09/17/2020 46.0 >40.0 Final     Direct Measure HDL   Date Value Ref Range Status   10/05/2021 45 >=40 mg/dL Final     LDL Cholesterol Calculated   Date Value Ref Range Status   10/05/2021 107 (H) <=100 mg/dL Final   11/28/2020 103 (H) <100 mg/dL Final     Comment:     Above desirable:  100-129 mg/dl  Borderline High:  130-159 mg/dL  High:             160-189 mg/dL  Very high:       >189 mg/dl     02/27/2020 82 <100 mg/dL Final     Comment:     Desirable:       <100 mg/dl     LDL Cholesterol  Direct   Date Value Ref Range Status   09/17/2020 86.0 0.0 - 129.0 Final     Triglycerides   Date Value Ref Range Status   10/05/2021 66 <150 mg/dL Final   11/28/2020 68 <150 mg/dL Final   09/17/2020 107.0 0.0 - 150.0 Final     Cholesterol/HDL Ratio   Date Value Ref Range Status   09/17/2020 3.3 0.0 - 5.0 Final   08/27/2015 3.0 0.0 - 5.0 Final       ASSESSMENT/PLAN:    1.  TYPE 1 DIABETES MELLITUS:  Piter had a + EJ antibody and C-peptide 0.8 ng/mL with glucose 144 in Nov 2019.  He started taking insulin in Nov 2019 and he is doing much better and has gained weight.  His most recent A1C was 6.7 % on 2/25/2022.  Blood sugar values are higher intermittently.   I had him increase Lantus 15 units subcutaneous each am and continue current Novolog I/C ratio and CF.  He is to continue to monitor his blood sugar using his Freestyle Libre2 sensor.  Pt's urine microalbuminuria was negative in Oct 2021.  Piter was seen by Oph here in July 2021 without retinopathy. Oph referral placed today for annual diabetic eye exam.  He reports less numbness in both feet.  Pt's LDL was 107 in Oct 2021. He has worked on reducing fast in his diet.  No vitals today.  Reminded pt to get the flu vaccine this season.    2. FOLLOW UP: with me in 6 months.  Pt has my contact number to call if he has any questions.  I placed an order for an A1C and annual fasting diabetic labs which pt will have done tomorrow.    Time spent reviewing chart, labs and Freestyle Libre2 sensor download data  today = 8 minutes.  Time for telephone visit today =17  minutes.  Time for documentation today = 15 minutes.    TOTAL TIME FOR VISIT TODAY = 40 minutes.    Sheila Brewer PA-C

## 2022-09-15 NOTE — PROGRESS NOTES
Piter CHENG Said is being evaluated via a billable video visit.        How would you like to obtain your AVS? D1G  For the video visit, send the invitation by: Text to cell phone: [phone]  Will anyone else be joining your video visit? No

## 2022-09-16 ENCOUNTER — TRANSFERRED RECORDS (OUTPATIENT)
Dept: HEALTH INFORMATION MANAGEMENT | Facility: CLINIC | Age: 47
End: 2022-09-16

## 2022-09-21 ENCOUNTER — LAB (OUTPATIENT)
Dept: LAB | Facility: CLINIC | Age: 47
End: 2022-09-21
Payer: COMMERCIAL

## 2022-09-21 DIAGNOSIS — E10.65 TYPE 1 DIABETES MELLITUS WITH HYPERGLYCEMIA (H): ICD-10-CM

## 2022-09-21 LAB
AST SERPL W P-5'-P-CCNC: 11 U/L (ref 0–45)
CHOLEST SERPL-MCNC: 157 MG/DL
CREAT SERPL-MCNC: 0.74 MG/DL (ref 0.66–1.25)
CREAT UR-MCNC: 103 MG/DL
FASTING STATUS PATIENT QL REPORTED: YES
GFR SERPL CREATININE-BSD FRML MDRD: >90 ML/MIN/1.73M2
HBA1C MFR BLD: 7.9 % (ref 0–5.6)
HDLC SERPL-MCNC: 43 MG/DL
LDLC SERPL CALC-MCNC: 102 MG/DL
MICROALBUMIN UR-MCNC: <5 MG/L
MICROALBUMIN/CREAT UR: NORMAL MG/G{CREAT}
NONHDLC SERPL-MCNC: 114 MG/DL
TRIGL SERPL-MCNC: 61 MG/DL
TSH SERPL DL<=0.005 MIU/L-ACNC: 1.39 MU/L (ref 0.4–4)

## 2022-09-21 PROCEDURE — 83036 HEMOGLOBIN GLYCOSYLATED A1C: CPT

## 2022-09-21 PROCEDURE — 82565 ASSAY OF CREATININE: CPT

## 2022-09-21 PROCEDURE — 84450 TRANSFERASE (AST) (SGOT): CPT

## 2022-09-21 PROCEDURE — 82043 UR ALBUMIN QUANTITATIVE: CPT

## 2022-09-21 PROCEDURE — 80061 LIPID PANEL: CPT

## 2022-09-21 PROCEDURE — 84443 ASSAY THYROID STIM HORMONE: CPT

## 2022-09-21 PROCEDURE — 36415 COLL VENOUS BLD VENIPUNCTURE: CPT

## 2022-09-30 ENCOUNTER — TELEPHONE (OUTPATIENT)
Dept: OPHTHALMOLOGY | Facility: CLINIC | Age: 47
End: 2022-09-30

## 2022-11-02 NOTE — PROGRESS NOTES
[facility]  [facility]           SUBJECTIVE       Piter Conway is a 47 year old male presenting to clinic today for a f/u of the right shoulder pain.    12/16/21:Osteoarthritis of right AC (acromioclavicular) joint     Tendinopathy of rotator cuff, right     Glenoid labral tear, right, subsequent encounter     46-year-old male presenting today as a follow-up for the right shoulder pain.  MRI was obtained on the last visit.  He is overall functionally doing well and unchanged from his previous in office visit.     Plan: After long discussion with the patient in detail about the most recent MRI findings, the patient can continue with his home exercise functioning program.  No indication for over-the-counter medications unless they are needed for acute pain.  Patient does have some mild tendinosis of the right shoulder subscapularis tendon but functionally he is without pain or limitation from his previous exam.  The most significant finding would be this posterior superior labral tear that was noticed on the MRI.  Did discuss this in detail with the patient as well.  He is not having any episodes of instability in the posterior arm pain that he had on his last visit with me in person was more related to the triceps muscle belly strain.  He is not having any clicking or locking as well.  Given this I think the patient can continue with physical therapy for overall rehabilitation of this finding.  If he does have persistent pain or his pain pattern changes, I would like to see him in person.  At that point could consider ultrasound-guided corticosteroid injection for relief of this.  Patient will follow-up as needed.     Is been a pleasure being a member of Piter's management team.  Return to clinic as needed.      Interval hx:  Patient returns to clinic today to discuss his ongoing right shoulder pain.  He was doing well up until September, after he was doing some  playful activities with his kids at the playground.  Did not hear a pop or have any specific injury, but did have more pain around the lateral arm.  He is not having numbness or tingling.  No instability.  Is interested in what he can do to help himself out.    PMH, Medications and Allergies were reviewed and updated as needed.    ROS:  As noted above otherwise negative.    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)     Adjustment disorder with mixed anxiety and depressed mood     Upper back pain     Cervicalgia     Acute pain of right shoulder     Acute low back pain with radicular symptoms, duration less than 6 weeks     Chronic bilateral low back pain with left-sided sciatica       Current Outpatient Medications   Medication Sig Dispense Refill     ACCU-CHEK GUIDE test strip TEST BLOOD SUGAR TWO TIMES A DAY OR AS DIRECTED 200 strip 2     blood glucose calibration (NO BRAND SPECIFIED) solution To accompany: whatever is covered by insurance test weekly 1 Bottle 3     blood glucose monitoring (ACCU-CHEK FASTCLIX) lancets TEST TWO TIMES A  each 3     Continuous Blood Gluc Sensor (FREESTYLE NAVEED 2 SENSOR) MISC 1 each every 14 days 1 each every 14 days. Change every 14 days. 2 each 5     DiphenhydrAMINE HCl (BENADRYL ALLERGY PO) Take 1 tablet by mouth daily       fluticasone (FLONASE) 50 MCG/ACT nasal spray Spray 2 sprays into both nostrils daily 16 g 3     insulin aspart (NOVOLOG FLEXPEN) 100 UNIT/ML pen INJECT 1 UNIT UNDER THE SKIN PER 15 GRAMS OF CARB PLUS 1 UNIT PER 50 ABOVE 150 (MAX DOSE 50 UNITS) 45 mL 3     insulin glargine (LANTUS SOLOSTAR) 100 UNIT/ML pen INJECT 15 UNITS UNDER THE SKIN EVERY MORNING 15 mL 3     insulin pen needle (BD MICH U/F) 32G X 4 MM miscellaneous USE 4 NEEDLES DAILY OR AS DIRECTED. 400 each 3     omeprazole (PRILOSEC) 20 MG DR capsule Take 20 mg  "po daily 90 capsule 1     polyethylene glycol (MIRALAX) powder Take 17 g (1 capful) by mouth 2 times daily as needed for constipation 510 g 1     urea (GORMEL) 20 % external cream Apply topically daily To feet. 120 g 5     hydrOXYzine (ATARAX) 25 MG tablet Take one po TID (Patient not taking: Reported on 9/15/2022) 90 tablet 1            OBJECTIVE:       Vitals:   Vitals:    11/03/22 1337   Weight: 78.5 kg (173 lb)   Height: 1.854 m (6' 1\")     BMI: Body mass index is 22.82 kg/m .    Gen:  Well nourished and in no acute distress  HEENT: Extraocular movement intact  Neck: Supple  Pulm:  Breathing Comfortably. No increased respiratory effort.  Psych: Euthymic. Appropriately answers questions    MSK: Right shoulder without evidence of asymmetry.  Range of motion full and intact.  Some tenderness to palpation on the lateral shoulder  in conjunction with the insertion of the supraspinatus area and lateral deltoid.  Mild strength deficit noted in supraspinatus as well as subscapularis.  No biceps or triceps strength deficits.  Positive empty can and reverse belly press off.  Negative cross body.  Negative [place].  Negative impingement with Neer's and Villa.      Study Result    Narrative & Impression   Exam: MRI of the right shoulder dated 12/14/2021.     COMPARISON: 10/25/2021.     CLINICAL HISTORY: Shoulder pain.     TECHNIQUE: Multiplanar, multisequence MR imaging of the right shoulder  was obtained using standard sequences in 3 orthogonal planes without  the use of intravenous or intra-articular gadolinium contrast.     FINDINGS:     Minimal fluid in the right shoulder glenohumeral joint. Trace fluid in  the subacromial subdeltoid bursa.     Mild to moderate degenerative changes at the acromioclavicular joint.  No os acromiale. Coracohumeral ligament is intact. Preserved  subcoracoid fat. Coracoclavicular and coracoacromial are intact.     Mild tendinosis of the supraspinatus and infraspinatus tendons " without  full-thickness tear or tendon retraction. The teres minor tendon is  intact. There is marked tendinosis of the subscapularis tendon with  low-grade intrasubstance partial thickness tearing.     The muscle bulk of the rotator cuff musculature is intact.     The biceps tendon is normal within the bicipital groove. The  intra-articular biceps tendon is intact.     The humeral head is well aligned with the glenoid. No evidence of Hill  Sachs lesion. No full thickness cartilage loss. Tearing of the  posterior superior labrum.                                                                      Impression:  1. Mild to moderate degenerative changes in the right shoulder  acromioclavicular joint.   2. No full-thickness tear or tendon retraction involving the right  shoulder rotator cuff tendons.  3. Marked tendinosis of the right shoulder subscapularis tendon  without full-thickness tear or tendon retraction.  4. Normal muscle bulk of the right shoulder rotator cuff musculature.  5. Normal-appearing biceps tendon.  6. Tearing of the posterior superior labrum.               ASSESSMENT and PLAN:     Piter was seen today for recheck.    Diagnoses and all orders for this visit:    Osteoarthritis of right AC (acromioclavicular) joint    Tendinopathy of rotator cuff, right    Glenoid labral tear, right, subsequent encounter    47-year-old man presenting to clinic today for ongoing right shoulder pain.  He did have some overuse activities 2 months ago that I believe led to more tendinitis of the already known tendinosis of the right shoulder.  He is interested in injections to mitigate his pain.  See procedure note below for this injection details.  I do think he would also benefit from a home exercise rehabilitation program for the rotator cuff.  He can follow-up with us on an as-needed basis.  If he is continuing to have difficulty in that area, I do think a formal physical therapy order would be  beneficial.    Subacromial Bursa - landmark Guided    The patient was informed of the risks and the benefits of the procedure and a written consent was signed.    The patient s right shoulder was prepped for appropriate landmark location     40 mg of triamcinolone suspension was drawn up into a 5 mL syringe with 4 mL of 1% lidocaine.    Injection was performed using sub-sterile technique. Patient was cleansed topically for 45 seconds with a chloraprep sponge. Topical ethyl chloride was used as an anesthetic.  A 1.5-inch 22-gauge needle was used to enter the right subacromial bursa under posterior landmark guidance.      There were no complications. The patient tolerated the procedure well. There was negligible bleeding.     The patient was instructed to ice the shoulder upon leaving clinic and refrain from overuse over the next 3 days.     The patient was instructed to call or go to the emergency room with any unusual pain, swelling, redness, or if otherwise concerned.      Options for treatment and/or follow-up care were reviewed with the patient was actively involved in the decision making process. Patient verbalized understanding and was in agreement with the plan.    Large Joint Injection: R subacromial bursa    Date/Time: 11/3/2022 2:00 PM  Performed by: Mesfin Madrigal DO  Authorized by: Mesfin Madrigal DO     Indications:  Pain  Needle Size:  22 G  Guidance: landmark guided    Approach:  Posterior  Location:  Shoulder      Site:  R subacromial bursa  Medications:  40 mg triamcinolone 40 MG/ML; 4 mL lidocaine (PF) 1 %  Outcome:  Tolerated well, no immediate complications  Procedure discussed: discussed risks, benefits, and alternatives    Consent Given by:  Patient  Timeout: timeout called immediately prior to procedure    Prep: patient was prepped and draped in usual sterile fashion            Mesfin Madrigal DO  , Sports Medicine  Department of [facility]  [facility]

## 2022-11-03 ENCOUNTER — OFFICE VISIT (OUTPATIENT)
Dept: ORTHOPEDICS | Facility: CLINIC | Age: 47
End: 2022-11-03
Payer: COMMERCIAL

## 2022-11-03 VITALS — HEIGHT: 73 IN | WEIGHT: 173 LBS | BODY MASS INDEX: 22.93 KG/M2

## 2022-11-03 DIAGNOSIS — M19.011 OSTEOARTHRITIS OF RIGHT AC (ACROMIOCLAVICULAR) JOINT: Primary | ICD-10-CM

## 2022-11-03 DIAGNOSIS — S43.431D GLENOID LABRAL TEAR, RIGHT, SUBSEQUENT ENCOUNTER: ICD-10-CM

## 2022-11-03 DIAGNOSIS — M67.911 TENDINOPATHY OF ROTATOR CUFF, RIGHT: ICD-10-CM

## 2022-11-03 PROCEDURE — 99214 OFFICE O/P EST MOD 30 MIN: CPT | Mod: 25 | Performed by: STUDENT IN AN ORGANIZED HEALTH CARE EDUCATION/TRAINING PROGRAM

## 2022-11-03 PROCEDURE — 20610 DRAIN/INJ JOINT/BURSA W/O US: CPT | Mod: RT | Performed by: STUDENT IN AN ORGANIZED HEALTH CARE EDUCATION/TRAINING PROGRAM

## 2022-11-03 RX ORDER — LIDOCAINE HYDROCHLORIDE 10 MG/ML
4 INJECTION, SOLUTION EPIDURAL; INFILTRATION; INTRACAUDAL; PERINEURAL
Status: SHIPPED | OUTPATIENT
Start: 2022-11-03

## 2022-11-03 RX ORDER — TRIAMCINOLONE ACETONIDE 40 MG/ML
40 INJECTION, SUSPENSION INTRA-ARTICULAR; INTRAMUSCULAR
Status: SHIPPED | OUTPATIENT
Start: 2022-11-03

## 2022-11-03 RX ADMIN — LIDOCAINE HYDROCHLORIDE 4 ML: 10 INJECTION, SOLUTION EPIDURAL; INFILTRATION; INTRACAUDAL; PERINEURAL at 14:00

## 2022-11-03 RX ADMIN — TRIAMCINOLONE ACETONIDE 40 MG: 40 INJECTION, SUSPENSION INTRA-ARTICULAR; INTRAMUSCULAR at 14:00

## 2022-11-03 NOTE — NURSING NOTE
[address] [address]  Dept: [phone]  ______________________________________________________________________________    Patient: Piter Conway   : 1975   MRN: [id number]   November 3, 2022    INVASIVE PROCEDURE SAFETY CHECKLIST    Date: 11/3/22   Procedure: Right subacromial bursa kenalog injection  Patient Name: Piter Conway  MRN: [id number]  YOB: 1975    Action: Complete sections as appropriate. Any discrepancy results in a HARD COPY until resolved.     PRE PROCEDURE:  Patient ID verified with 2 identifiers (name and  or MRN): Yes  Procedure and site verified with patient/designee (when able): Yes  Accurate consent documentation in medical record: Yes  H&P (or appropriate assessment) documented in medical record: Yes  H&P must be up to 20 days prior to procedure and updates within 24 hours of procedure as applicable: NA  Relevant diagnostic and radiology test results appropriately labeled and displayed as applicable: Yes  Procedure site(s) marked with provider initials: NA    TIMEOUT:  Time-Out performed immediately prior to starting procedure, including verbal and active participation of all team members addressing the following:Yes  * Correct patient identify  * Confirmed that the correct side and site are marked  * An accurate procedure consent form  * Agreement on the procedure to be done  * Correct patient position  * Relevant images and results are properly labeled and appropriately displayed  * The need to administer antibiotics or fluids for irrigation purposes during the procedure as applicable   * Safety precautions based on patient history or medication use    DURING PROCEDURE: Verification of correct person, site, and procedures any time the responsibility for care of the patient is transferred to another member of the care team.       Prior to injection, verified patient identity using patient's name and date of  birth.  Due to injection administration, patient instructed to remain in clinic for 15 minutes  afterwards, and to report any adverse reaction to me immediately.    Bursa injection was performed.      Drug Amount Wasted:  Yes: 1 mg/ml  lidocaine  Vial/Syringe: Single dose vial  Expiration Date:  08/2026      Lula Oneal, NAVIN  November 3, 2022

## 2022-11-03 NOTE — LETTER
11/3/2022      RE: Piter Conway  [address]  [facility] [address]     Dear Colleague,    Thank you for referring your patient, Piter Conway, to the [facility] [place]. Please see a copy of my visit note below.    [facility]  Sports Medicine Clinic  [facility]           SUBJECTIVE       Piter Conway is a 47 year old male presenting to clinic today for a f/u of the right shoulder pain.    12/16/21:Osteoarthritis of right AC (acromioclavicular) joint     Tendinopathy of rotator cuff, right     Glenoid labral tear, right, subsequent encounter     46-year-old male presenting today as a follow-up for the right shoulder pain.  MRI was obtained on the last visit.  He is overall functionally doing well and unchanged from his previous in office visit.     Plan: After long discussion with the patient in detail about the most recent MRI findings, the patient can continue with his home exercise functioning program.  No indication for over-the-counter medications unless they are needed for acute pain.  Patient does have some mild tendinosis of the right shoulder subscapularis tendon but functionally he is without pain or limitation from his previous exam.  The most significant finding would be this posterior superior labral tear that was noticed on the MRI.  Did discuss this in detail with the patient as well.  He is not having any episodes of instability in the posterior arm pain that he had on his last visit with me in person was more related to the triceps muscle belly strain.  He is not having any clicking or locking as well.  Given this I think the patient can continue with physical therapy for overall rehabilitation of this finding.  If he does have persistent pain or his pain pattern changes, I would like to see him in person.  At that point could consider ultrasound-guided corticosteroid injection for relief of this.  Patient will follow-up as  needed.     Is been a pleasure being a member of [place]'s management team.  Return to clinic as needed.      Interval hx:  Patient returns to clinic today to discuss his ongoing right shoulder pain.  He was doing well up until September, after he was doing some playful activities with his kids at the playground.  Did not hear a pop or have any specific injury, but did have more pain around the lateral arm.  He is not having numbness or tingling.  No instability.  Is interested in what he can do to help himself out.    PMH, Medications and Allergies were reviewed and updated as needed.    ROS:  As noted above otherwise negative.    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)     Adjustment disorder with mixed anxiety and depressed mood     Upper back pain     Cervicalgia     Acute pain of right shoulder     Acute low back pain with radicular symptoms, duration less than 6 weeks     Chronic bilateral low back pain with left-sided sciatica       Current Outpatient Medications   Medication Sig Dispense Refill     ACCU-CHEK GUIDE test strip TEST BLOOD SUGAR TWO TIMES A DAY OR AS DIRECTED 200 strip 2     blood glucose calibration (NO BRAND SPECIFIED) solution To accompany: whatever is covered by insurance test weekly 1 Bottle 3     blood glucose monitoring (ACCU-CHEK FASTCLIX) lancets TEST TWO TIMES A  each 3     Continuous Blood Gluc Sensor (FREESTYLE NAVEED 2 SENSOR) [facility] 1 each every 14 days 1 each every 14 days. Change every 14 days. 2 each 5     DiphenhydrAMINE HCl (BENADRYL ALLERGY PO) Take 1 tablet by mouth daily       fluticasone (FLONASE) 50 MCG/ACT nasal spray Spray 2 sprays into both nostrils daily 16 g 3     insulin aspart (NOVOLOG FLEXPEN) 100 UNIT/ML pen INJECT 1 UNIT UNDER THE SKIN PER 15 GRAMS OF CARB PLUS 1 UNIT PER 50 ABOVE 150 (MAX DOSE 50 UNITS) 45 mL 3     insulin  "glargine (LANTUS SOLOSTAR) 100 UNIT/ML pen INJECT 15 UNITS UNDER THE SKIN EVERY MORNING 15 mL 3     insulin pen needle (BD MICH U/F) 32G X 4 MM miscellaneous USE 4 NEEDLES DAILY OR AS DIRECTED. 400 each 3     omeprazole (PRILOSEC) 20 MG DR capsule Take 20 mg po daily 90 capsule 1     polyethylene glycol (MIRALAX) powder Take 17 g (1 capful) by mouth 2 times daily as needed for constipation 510 g 1     urea (GORMEL) 20 % external cream Apply topically daily To feet. 120 g 5     hydrOXYzine (ATARAX) 25 MG tablet Take one po TID (Patient not taking: Reported on 9/15/2022) 90 tablet 1            OBJECTIVE:       Vitals:   Vitals:    11/03/22 1337   Weight: 78.5 kg (173 lb)   Height: 1.854 m (6' 1\")     BMI: Body mass index is 22.82 kg/m .    Gen:  Well nourished and in no acute distress  HEENT: Extraocular movement intact  Neck: Supple  Pulm:  Breathing Comfortably. No increased respiratory effort.  Psych: Euthymic. Appropriately answers questions    MSK: Right shoulder without evidence of asymmetry.  Range of motion full and intact.  Some tenderness to palpation on the lateral shoulder  in conjunction with the insertion of the supraspinatus area and lateral deltoid.  Mild strength deficit noted in supraspinatus as well as subscapularis.  No biceps or triceps strength deficits.  Positive empty can and reverse belly press off.  Negative cross body.  Negative [place].  Negative impingement with Neer's and [place].      Study Result    Narrative & Impression   Exam: MRI of the right shoulder dated 12/14/2021.     COMPARISON: 10/25/2021.     CLINICAL HISTORY: Shoulder pain.     TECHNIQUE: Multiplanar, multisequence MR imaging of the right shoulder  was obtained using standard sequences in 3 orthogonal planes without  the use of intravenous or intra-articular gadolinium contrast.     FINDINGS:     Minimal fluid in the right shoulder glenohumeral joint. Trace fluid in  the subacromial subdeltoid bursa.     Mild to moderate " degenerative changes at the acromioclavicular joint.  No os acromiale. Coracohumeral ligament is intact. Preserved  subcoracoid fat. Coracoclavicular and coracoacromial are intact.     Mild tendinosis of the supraspinatus and infraspinatus tendons without  full-thickness tear or tendon retraction. The teres minor tendon is  intact. There is marked tendinosis of the subscapularis tendon with  low-grade intrasubstance partial thickness tearing.     The muscle bulk of the rotator cuff musculature is intact.     The biceps tendon is normal within the bicipital groove. The  intra-articular biceps tendon is intact.     The humeral head is well aligned with the glenoid. No evidence of Hill  Sachs lesion. No full thickness cartilage loss. Tearing of the  posterior superior labrum.                                                                      Impression:  1. Mild to moderate degenerative changes in the right shoulder  acromioclavicular joint.   2. No full-thickness tear or tendon retraction involving the right  shoulder rotator cuff tendons.  3. Marked tendinosis of the right shoulder subscapularis tendon  without full-thickness tear or tendon retraction.  4. Normal muscle bulk of the right shoulder rotator cuff musculature.  5. Normal-appearing biceps tendon.  6. Tearing of the posterior superior labrum.               ASSESSMENT and PLAN:     Piter was seen today for recheck.    Diagnoses and all orders for this visit:    Osteoarthritis of right AC (acromioclavicular) joint    Tendinopathy of rotator cuff, right    Glenoid labral tear, right, subsequent encounter    47-year-old man presenting to clinic today for ongoing right shoulder pain.  He did have some overuse activities 2 months ago that I believe led to more tendinitis of the already known tendinosis of the right shoulder.  He is interested in injections to mitigate his pain.  See procedure note below for this injection details.  I do think he would also  benefit from a home exercise rehabilitation program for the rotator cuff.  He can follow-up with us on an as-needed basis.  If he is continuing to have difficulty in that area, I do think a formal physical therapy order would be beneficial.    Subacromial Bursa - landmark Guided    The patient was informed of the risks and the benefits of the procedure and a written consent was signed.    The patient s right shoulder was prepped for appropriate landmark location     40 mg of triamcinolone suspension was drawn up into a 5 mL syringe with 4 mL of 1% lidocaine.    Injection was performed using sub-sterile technique. Patient was cleansed topically for 45 seconds with a chloraprep sponge. Topical ethyl chloride was used as an anesthetic.  A 1.5-inch 22-gauge needle was used to enter the right subacromial bursa under posterior landmark guidance.      There were no complications. The patient tolerated the procedure well. There was negligible bleeding.     The patient was instructed to ice the shoulder upon leaving clinic and refrain from overuse over the next 3 days.     The patient was instructed to call or go to the emergency room with any unusual pain, swelling, redness, or if otherwise concerned.      Options for treatment and/or follow-up care were reviewed with the patient was actively involved in the decision making process. Patient verbalized understanding and was in agreement with the plan.    Large Joint Injection: R subacromial bursa    Date/Time: 11/3/2022 2:00 PM  Performed by: Mesfin Madrigal DO  Authorized by: Mesfin Madrigal DO     Indications:  Pain  Needle Size:  22 G  Guidance: landmark guided    Approach:  Posterior  Location:  Shoulder      Site:  R subacromial bursa  Medications:  40 mg triamcinolone 40 MG/ML; 4 mL lidocaine (PF) 1 %  Outcome:  Tolerated well, no immediate complications  Procedure discussed: discussed risks, benefits, and alternatives    Consent Given by:  Patient  Timeout: timeout  called immediately prior to procedure    Prep: patient was prepped and draped in usual sterile fashion        Mesfin Madrigal DO  , Sports Medicine  Department of [facility]

## 2022-11-03 NOTE — PATIENT INSTRUCTIONS
Rotator Cuff Injury     WHAT IS A ROTATOR CUFF INJURY?    A rotator cuff injury is irritation of or damage to the group of tendons and muscles that hold your shoulder joint together. Tendons are strong bands of tissue that attach muscle to bone. You use the muscles and tendons in your shoulder joint to move your shoulder and raise your arm over your head.        WHAT IS THE CAUSE?    A rotator cuff injury can be caused by:    Overuse of your shoulder in a sport or work activity that involves repetitive overhead movement of your shoulder, like swimming, baseball (mainly pitching), football, tennis, painting, plastering, or housework.  A sudden activity that twists your shoulder or tears your tendon, such as using your arm to break a fall, falling onto your arm, or lifting a heavy object  You may be at higher risk for a rotator cuff injury if you have poor head and shoulder posture, especially if you are older.    WHAT ARE THE SYMPTOMS?    Symptoms may include:    Pain and weakness in your arm and shoulder  Loss of shoulder movement, especially when you try to raise your arm overhead    HOW IS IT DIAGNOSED?    Your healthcare provider will ask about your symptoms, activities, and medical history and examine you. You may have X-rays or other scans or procedures, such as:    An ultrasound, which uses sound waves to show pictures of your shoulder joint  An MRI, which uses a strong magnetic field and radio waves to show detailed pictures of your shoulder joint  An arthrogram, which is an X-ray or MRI taken after a dye is injected into your joint to outline its shape  Arthroscopy, which is a type of surgery done with a small scope inserted into your joint so your provider can look directly at your joint    HOW IS IT TREATED?    You will need to change or stop doing the activities that cause pain until your injury has healed. For example, avoid strenuous activity or any overhead motion that causes pain. Also, try to make  sure that you are practicing good posture and are not slouching forward.    Your healthcare provider may recommend stretching and strengthening exercises to help you heal.    If you have a bad tear, you may need to have it repaired with surgery. After surgery, your treatment plan will include physical therapy to strengthen your shoulder as it heals.    The pain often gets better within a few weeks with self-care, but some injuries may take several months or longer to heal. It s important to follow all of your healthcare provider s instructions.    HOW CAN I TAKE CARE OF MYSELF?    To keep swelling down and help relieve pain:    Put an ice pack, gel pack, or package of frozen vegetables wrapped in a cloth on the area every 3 to 4 hours for up to 20 minutes at a time.  Take nonprescription pain medicine, such as acetaminophen, ibuprofen, or naproxen. Nonsteroidal anti-inflammatory medicines (NSAIDs), such as ibuprofen and naproxen, may cause stomach bleeding and other problems. These risks increase with age. Read the label and take as directed. Unless recommended by your healthcare provider, you should not take this medicine for more than 10 days.  Moist heat may help relieve pain, relax your muscles, and make it easier to move your arm and shoulder. Put moist heat on the injured area for 10 to 15 minutes before you do warm-up and stretching exercises. Moist heat includes heat patches or moist heating pads that you can buy at most drugstores, a warm wet washcloth, or a hot shower. To prevent burns to your skin, follow directions on the package and do not lie on any type of hot pad. Don t use heat if you have swelling.    Follow your healthcare provider's instructions, including any exercises recommended by your provider. Ask your provider:    How and when you will hear your test results  How long it will take to recover  What activities you should avoid, including how much you can lift, and when you can return to your  normal activities  How to take care of yourself at home  What symptoms or problems you should watch for and what to do if you have them  Make sure you know when you should come back for a checkup.    HOW CAN I HELP PREVENT A ROTATOR CUFF INJURY?    Warm-up exercises and stretching before activities can help prevent injuries. For example, do exercises that strengthen your shoulder muscles. If your arm or shoulder hurts after exercise, putting ice on it may help keep it from getting injured.    Follow safety rules and use any protective equipment recommended for your work or sport.    Avoid activities that cause pain. For example, avoid lifting heavy objects over your head.    Developed by [employer].  Published by [employer].  Copyright  2014 [employer] and/or one of its subsidiaries. All rights reserved.    Rotator Cuff Exercises    Isometric shoulder external rotation:  a doorway with your elbow bent 90 degrees and the back of the wrist on your injured side pressed against the door frame. Try to press your hand outward into the door frame. Hold for 5 seconds. Do 2 sets of 15.    Isometric shoulder internal rotation:  a doorway with your elbow bent 90 degrees and the front of the wrist on your injured side pressed against the door frame. Try to press your palm into the door frame. Hold for 5 seconds. Do 2 sets of 15.  Wand exercise, flexion: Stand upright and hold a stick in both hands, palms down. Stretch your arms by lifting them over your head, keeping your arms straight. Hold for 5 seconds and return to the starting position. Repeat 10 times.    Wand exercise, extension: Stand upright and hold a stick in both hands behind your back. Move the stick away from your back. Hold this position for 5 seconds. Relax and return to the starting position. Repeat 10 times.  Wand exercise, external rotation: Lie on your back and hold a stick in both hands, palms up. Your upper arms should be  resting on the floor with your elbows at your sides and bent 90 degrees. Use your uninjured arm to push your injured arm out away from your body. Keep the elbow of your injured arm at your side while it is being pushed. Hold the stretch for 5 seconds. Repeat 10 times.    Wand exercise, shoulder abduction and adduction: Stand and hold a stick with both hands, palms facing away from your body. Rest the stick against the front of your thighs. Use your uninjured arm to push your injured arm out to the side and up as high as possible. Keep your arms straight. Hold for 5 seconds. Repeat 10 times.    Resisted shoulder external rotation: Stand sideways next to a door with your injured arm farther from the door. Tie a knot in the end of the tubing and shut the knot in the door at waist level (or use cable weight machine at gym). Hold the other end of the tubing with the hand of your injured arm. Rest the hand of your injured arm across your stomach. Keeping your elbow in at your side, rotate your arm outward and away from your waist. Slowly return your arm to the starting position. Make sure you keep your elbow bent 90 degrees and your forearm parallel to the floor. Repeat 10 times. Build up to 2 sets of 15.    Resisted shoulder internal rotation: Stand sideways next to a door with your injured arm closest to the door. Tie a knot in the end of the tubing and shut the knot in the door at waist level (or use cable weight machine at gym). Hold the other end of the tubing with the hand of your injured arm. Bend the elbow of your injured arm 90 degrees. Keeping your elbow in at your side, rotate your forearm across your body and then slowly back to the starting position. Make sure you keep your forearm parallel to the floor. Do 2 sets of 8 to 12.    Scaption: Stand with your arms at your sides and with your elbows straight. Slowly raise your arms to eye level. As you raise your arms, spread them apart so that they are only  "slightly in front of your body (at about a 30-degree angle to the front of your body). Point your thumbs toward the ceiling. Hold for 2 seconds and lower your arms slowly. Do 2 sets of 15. Progress to holding a soup can or light weight when you are doing the exercise and increase the weight as the exercise gets easier.    Side-lying external rotation: Lie on your uninjured side with your injured arm at your side and your elbow bent 90 degrees. Keeping your elbow against your side, raise your forearm toward the ceiling and hold for 2 seconds. Slowly lower your arm. Do 2 sets of 15. You can start doing this exercise holding a soup can or light weight and gradually increase the weight as long as there is no pain.    Horizontal abduction: Lie on your stomach on a table or the edge of a bed with the arm on your injured side hanging down over the edge. Raise your arm out to the side, with your thumb pointed toward the ceiling, until your arm is parallel to the floor. Hold for 2 seconds and then lower it slowly. Start this exercise with no weight. As you get stronger, add a light weight or hold a soup can. Do 2 sets of 15.    Push-up with a plus: Begin on the floor on your hands and knees. Keep your hands a shoulder width apart and lift your feet off the floor. Arch your back as high as possible and round your shoulders (this is the \"plus\" part or the exercise). Bend your elbows and lower your body to the floor. Return to the starting position and arch your back again. Do 2 sets of 15.         "

## 2022-11-09 ENCOUNTER — OFFICE VISIT (OUTPATIENT)
Dept: OPHTHALMOLOGY | Facility: CLINIC | Age: 47
End: 2022-11-09
Attending: PHYSICIAN ASSISTANT
Payer: COMMERCIAL

## 2022-11-09 DIAGNOSIS — H52.4 MYOPIA OF BOTH EYES WITH ASTIGMATISM AND PRESBYOPIA: ICD-10-CM

## 2022-11-09 DIAGNOSIS — H52.13 MYOPIA OF BOTH EYES WITH ASTIGMATISM AND PRESBYOPIA: ICD-10-CM

## 2022-11-09 DIAGNOSIS — E10.65 TYPE 1 DIABETES MELLITUS WITH HYPERGLYCEMIA (H): Primary | ICD-10-CM

## 2022-11-09 DIAGNOSIS — H52.203 MYOPIA OF BOTH EYES WITH ASTIGMATISM AND PRESBYOPIA: ICD-10-CM

## 2022-11-09 DIAGNOSIS — H10.13 ALLERGIC CONJUNCTIVITIS OF BOTH EYES: ICD-10-CM

## 2022-11-09 PROCEDURE — 92014 COMPRE OPH EXAM EST PT 1/>: CPT | Performed by: STUDENT IN AN ORGANIZED HEALTH CARE EDUCATION/TRAINING PROGRAM

## 2022-11-09 PROCEDURE — 92015 DETERMINE REFRACTIVE STATE: CPT

## 2022-11-09 PROCEDURE — G0463 HOSPITAL OUTPT CLINIC VISIT: HCPCS

## 2022-11-09 ASSESSMENT — SLIT LAMP EXAM - LIDS
COMMENTS: NORMAL
COMMENTS: NORMAL

## 2022-11-09 ASSESSMENT — CUP TO DISC RATIO
OS_RATIO: 0.4
OD_RATIO: 0.4

## 2022-11-09 ASSESSMENT — CONF VISUAL FIELD
OD_SUPERIOR_TEMPORAL_RESTRICTION: 0
OD_INFERIOR_TEMPORAL_RESTRICTION: 0
OD_SUPERIOR_NASAL_RESTRICTION: 0
OD_INFERIOR_NASAL_RESTRICTION: 0
METHOD: COUNTING FINGERS
OS_INFERIOR_TEMPORAL_RESTRICTION: 0
OD_NORMAL: 1
OS_NORMAL: 1
OS_SUPERIOR_TEMPORAL_RESTRICTION: 0
OS_INFERIOR_NASAL_RESTRICTION: 0
OS_SUPERIOR_NASAL_RESTRICTION: 0

## 2022-11-09 ASSESSMENT — VISUAL ACUITY
OD_SC: 20/40
OD_SC+: +2
OS_PH_SC: 20/20
OD_PH_SC: 20/25
METHOD: SNELLEN - LINEAR
OD_PH_SC+: +2
OS_SC: 20/30

## 2022-11-09 ASSESSMENT — TONOMETRY
OD_IOP_MMHG: 18
OS_IOP_MMHG: 17
IOP_METHOD: ICARE

## 2022-11-09 ASSESSMENT — REFRACTION_MANIFEST
OD_CYLINDER: +0.50
OD_AXIS: 170
OD_SPHERE: -0.75
OS_AXIS: 010
OS_ADD: +1.75
OD_ADD: +1.75
OS_SPHERE: -0.75
OS_CYLINDER: +1.25

## 2022-11-09 ASSESSMENT — EXTERNAL EXAM - LEFT EYE: OS_EXAM: NORMAL

## 2022-11-09 ASSESSMENT — EXTERNAL EXAM - RIGHT EYE: OD_EXAM: NORMAL

## 2022-11-09 NOTE — PROGRESS NOTES
HPI     Diabetic Eye Exam Follow Up    Vision is stable.  Associated symptoms include tearing.  Negative for itching, flashes and floaters.  Diabetes characteristics include Type 1 and on insulin.  Treatments tried include no treatments.  Pain was noted as 0/10.           Comments    DM eye exam.  The patient mentions he has intermittent itchy, red eyes during allergy season.   He has intermittent tearing when it is windy or cold air hits his eyes.  Lab Results       Component                Value               Date                       A1C                      7.9                 09/21/2022                 A1C                      6.7                 02/25/2022                 A1C                      7.0                 10/05/2021                 A1C                      7.3                 06/30/2021                 A1C                      7.2                 11/28/2020                 A1C                      7.6                 09/17/2020                 A1C                      6.9                 03/04/2019                 A1C                      6.3                 12/11/2017             CARINE Dumas, COA 9:46 AM 11/09/2022                Last edited by Olga Escobar COA on 11/9/2022  9:46 AM.          Review of systems for the eyes was negative other than the pertinent positives/negatives listed in the HPI.    Ocular Meds: ATs PRN OU otc anti-allergy drop PRN OU    Ocular Hx: allergic conjunctivitis OU    FOHx: no family history of glaucoma or blindness    PMHx: T1DM    Assessment & Plan      Piter Conway is a 47 year old male with the following diagnoses:    1. Type 1 diabetes mellitus with hyperglycemia (H)    2. Allergic conjunctivitis of both eyes    3. Myopia of both eyes with astigmatism and presbyopia       T1DM without Retinopathy OU  - diagnosed in 2014  - strict BP/BG control  - yearly DFE    Allergic Conjunctivitis OU  - seasonal symptoms; doing okay today off of drops  - Uses  anti-histamine and nasal spray when needed  - Ok to continue with OTC anti-allergy drops    Myopia of both eyes with astigmatism and presbyopia  - new MRx for glasses dispensed to patient with excellent BCVA    Patient disposition:   Return in about 1 year (around 11/9/2023) for Annual Visit, or sooner changes.        Attending Physician Attestation:  Complete documentation of historical and exam elements from today's encounter can be found in the full encounter summary report (not reduplicated in this progress note).  I personally obtained the chief complaint(s) and history of present illness.  I confirmed and edited as necessary the review of systems, past medical/surgical history, family history, social history, and examination findings as documented by others; and I examined the patient myself.  I personally reviewed the relevant tests, images, and reports as documented above.  I formulated and edited as necessary the assessment and plan and discussed the findings and management plan with the patient and family. . - Marlena Wilcox MD

## 2022-11-09 NOTE — NURSING NOTE
Chief Complaints and History of Present Illnesses   Patient presents with     Diabetic Eye Exam Follow Up     Chief Complaint(s) and History of Present Illness(es)     Diabetic Eye Exam Follow Up            Vision: is stable    Associated symptoms: tearing.  Negative for itching, flashes and floaters    Diabetes Type: Type 1 and on insulin    Treatments tried: no treatments    Pain scale: 0/10          Comments    DM eye exam.  The patient mentions he has intermittent itchy, red eyes during allergy season.   He has intermittent tearing when it is windy or cold air hits his eyes.  Lab Results       Component                Value               Date                       A1C                      7.9                 09/21/2022                 A1C                      6.7                 02/25/2022                 A1C                      7.0                 10/05/2021                 A1C                      7.3                 06/30/2021                 A1C                      7.2                 11/28/2020                 A1C                      7.6                 09/17/2020                 A1C                      6.9                 03/04/2019                 A1C                      6.3                 12/11/2017             Olga Escobar, COA, COA 9:46 AM 11/09/2022

## 2022-11-09 NOTE — LETTER
11/9/2022       RE: Piter Conway  [address]  [facility] [address]     Dear Colleague,    Thank you for referring your patient, Piter Conway, to the [facility] [place] at [facility]. Please see a copy of my visit note below.    HPI     Diabetic Eye Exam Follow Up    Vision is stable.  Associated symptoms include tearing.  Negative for itching, flashes and floaters.  Diabetes characteristics include Type 1 and on insulin.  Treatments tried include no treatments.  Pain was noted as 0/10.           Comments    DM eye exam.  The patient mentions he has intermittent itchy, red eyes during allergy season.   He has intermittent tearing when it is windy or cold air hits his eyes.  Lab Results       Component                Value               Date                       A1C                      7.9                 09/21/2022                 A1C                      6.7                 02/25/2022                 A1C                      7.0                 10/05/2021                 A1C                      7.3                 06/30/2021                 A1C                      7.2                 11/28/2020                 A1C                      7.6                 09/17/2020                 A1C                      6.9                 03/04/2019                 A1C                      6.3                 12/11/2017             Olga Escobar, COA, COA 9:46 AM 11/09/2022                Last edited by Olga Escobar, CARINE on 11/9/2022  9:46 AM.          Review of systems for the eyes was negative other than the pertinent positives/negatives listed in the HPI.    Ocular Meds: ATs PRN OU otc anti-allergy drop PRN OU    Ocular Hx: allergic conjunctivitis OU    FOHx: no family history of glaucoma or blindness    PMHx: T1DM    Assessment & Plan     Piter Conway is a 47 year old male with the following diagnoses:    1. Type 1 diabetes  mellitus with hyperglycemia (H)    2. Allergic conjunctivitis of both eyes    3. Myopia of both eyes with astigmatism and presbyopia       T1DM without Retinopathy OU  - diagnosed in 2014  - strict BP/BG control  - yearly DFE    Allergic Conjunctivitis OU  - seasonal symptoms; doing okay today off of drops  - Uses anti-histamine and nasal spray when needed  - Ok to continue with OTC anti-allergy drops    Myopia of both eyes with astigmatism and presbyopia  - new MRx for glasses dispensed to patient with excellent BCVA    Patient disposition:   Return in about 1 year (around 11/9/2023) for Annual Visit, or sooner changes.    Attending Physician Attestation:  Complete documentation of historical and exam elements from today's encounter can be found in the full encounter summary report (not reduplicated in this progress note).  I personally obtained the chief complaint(s) and history of present illness.  I confirmed and edited as necessary the review of systems, past medical/surgical history, family history, social history, and examination findings as documented by others; and I examined the patient myself.  I personally reviewed the relevant tests, images, and reports as documented above.  I formulated and edited as necessary the assessment and plan and discussed the findings and management plan with the patient and family. . - Marlena Wilcox MD

## 2022-11-13 ENCOUNTER — OFFICE VISIT (OUTPATIENT)
Dept: URGENT CARE | Facility: URGENT CARE | Age: 47
End: 2022-11-13
Payer: COMMERCIAL

## 2022-11-13 VITALS
DIASTOLIC BLOOD PRESSURE: 64 MMHG | BODY MASS INDEX: 22.82 KG/M2 | TEMPERATURE: 97.3 F | WEIGHT: 173 LBS | OXYGEN SATURATION: 97 % | SYSTOLIC BLOOD PRESSURE: 110 MMHG | HEART RATE: 68 BPM | RESPIRATION RATE: 15 BRPM

## 2022-11-13 DIAGNOSIS — E13.9 LADA (LATENT AUTOIMMUNE DIABETES MELLITUS IN ADULTS) (H): ICD-10-CM

## 2022-11-13 PROCEDURE — 99203 OFFICE O/P NEW LOW 30 MIN: CPT | Performed by: FAMILY MEDICINE

## 2022-11-13 RX ORDER — INSULIN GLARGINE 100 [IU]/ML
INJECTION, SOLUTION SUBCUTANEOUS
Qty: 15 ML | Refills: 3 | Status: SHIPPED | OUTPATIENT
Start: 2022-11-13 | End: 2023-03-10

## 2022-11-13 RX ORDER — INSULIN ASPART 100 [IU]/ML
INJECTION, SOLUTION INTRAVENOUS; SUBCUTANEOUS
Qty: 45 ML | Refills: 3 | Status: SHIPPED | OUTPATIENT
Start: 2022-11-13 | End: 2023-03-10

## 2022-11-13 NOTE — PROGRESS NOTES
Subjective: [place] and he thought he had refills on his diabetes medications but he did not.  He just needs refills sent.  He had his doses yesterday and has just avoided eating till he can get a refill today.    Objective: Vitals are stable, NAD.    Assessment and plan: Diabetes, needs refill of his 2 insulin shots, refills sent.

## 2022-11-14 RX ORDER — INSULIN GLARGINE 100 [IU]/ML
INJECTION, SOLUTION SUBCUTANEOUS
Qty: 15 ML | Refills: 4 | Status: SHIPPED | OUTPATIENT
Start: 2022-11-14 | End: 2023-03-10

## 2022-11-14 NOTE — TELEPHONE ENCOUNTER
insulin glargine (LANTUS SOLOSTAR) 100 UNIT/ML pen        Last Written Prescription Date:  11/13/22  Last Fill Quantity: 15mL,   # refills: 3  Last Office Visit : 09/15/22  Future Office visit:  03/10/23    Routing refill request to provider for review/approval because:  Insulin - refilled per clinic

## 2022-11-29 DIAGNOSIS — K21.9 GASTROESOPHAGEAL REFLUX DISEASE WITHOUT ESOPHAGITIS: ICD-10-CM

## 2022-11-30 NOTE — CONFIDENTIAL NOTE
Omeprazole (Prilosec) 20 mg    Last Office Visit: 12/7/21  [place] [facility] [place]  Medication last refilled: 6/3/22 #90 with 1 refill(s)    Medication is being filled for 1 time refill only due to:  Patient needs to be seen because it has been more than one year since last visit.    [employer] message sent to patient to call and schedule appointment.    SATNAM MccrayN, RN, CCM

## 2022-12-02 DIAGNOSIS — E13.9 LADA (LATENT AUTOIMMUNE DIABETES MELLITUS IN ADULTS) (H): ICD-10-CM

## 2022-12-02 RX ORDER — PEN NEEDLE, DIABETIC 32GX 5/32"
NEEDLE, DISPOSABLE MISCELLANEOUS
Qty: 400 EACH | Refills: 3 | Status: SHIPPED | OUTPATIENT
Start: 2022-12-02 | End: 2023-03-10

## 2022-12-15 ENCOUNTER — OFFICE VISIT (OUTPATIENT)
Dept: FAMILY MEDICINE | Facility: CLINIC | Age: 47
End: 2022-12-15
Payer: COMMERCIAL

## 2022-12-15 VITALS
HEART RATE: 76 BPM | SYSTOLIC BLOOD PRESSURE: 132 MMHG | DIASTOLIC BLOOD PRESSURE: 89 MMHG | WEIGHT: 164.25 LBS | TEMPERATURE: 97 F | HEIGHT: 72 IN | BODY MASS INDEX: 22.25 KG/M2 | OXYGEN SATURATION: 98 %

## 2022-12-15 DIAGNOSIS — Z23 NEED FOR INFLUENZA VACCINATION: ICD-10-CM

## 2022-12-15 DIAGNOSIS — K13.6 IRRITATION OF ORAL CAVITY: ICD-10-CM

## 2022-12-15 DIAGNOSIS — K22.2 SCHATZKI'S RING: Primary | ICD-10-CM

## 2022-12-15 DIAGNOSIS — Z23 NEED FOR PNEUMOCOCCAL VACCINE: ICD-10-CM

## 2022-12-15 DIAGNOSIS — K44.9 HIATAL HERNIA: ICD-10-CM

## 2022-12-15 DIAGNOSIS — K21.9 GASTROESOPHAGEAL REFLUX DISEASE WITHOUT ESOPHAGITIS: ICD-10-CM

## 2022-12-15 DIAGNOSIS — M79.10 MUSCLE ACHE: ICD-10-CM

## 2022-12-15 NOTE — PROGRESS NOTES
Piter Conway is a 47 year old male with hx of latent onset DM, requiring insulin, low back and cervicalgia, ED, seasonal allergies and GERD. He is here for the following issues:    Gastroesophageal reflux disease without esophagitis   Piter presents for discussion about GI symptoms. He has been prescribed omeprazole 20mg dose for treatment of GERD. He takes this daily in the morning. He reports some remaining mid epigastric pain after eating large amounts of food and in the morning after having snacks late the night before. He also reports some nausea. He notes that eating spicy food worsens his symptoms and so he has stopped doing so. Denies decreased appetite or abdominal pain. Discussed meaning of EGD findings from 12/2020.    His last EGD on 12/2020 showed the following:   - 1 cm hiatal hernia.                        - Non obstructive Schatzki's ring at the lower esophageal sphincter                        - Erythematous mucosa in the stomach. Biopsied.--negative H pylori    Dysphagia: 3 instances of liquids coming back up in the last year.  Black stool: none.  Last Hgb (6/2021) 15.3    Caffeine: 1 cup of tea/day  EtOH: None    Muscle Pain  Piter reports significant muscle pain in his legs after recent exercise. He has been doing more squats at one time than he is used to. He states that exercise did not cause him as much pain in the past.      Vaccinations  Tdap due 2/2023  Flu vaccine due  Covid Bivalent booster due, go to pharmacy for this immunization    Mouth Irritation  Piter reports intermittent soreness of his mouth, on the inside of his left cheek. This usually resolves on its own, but sometimes stays for a while. This soreness is present today and he would like to get this checked. He has tried using Listerine mouth wash during flares but this causes burning.  Nonsmoker.       Patient Active Problem List   Diagnosis     Erectile dysfunction     Atopic dermatitis     Seasonal allergic rhinitis     SAMANTA  (latent autoimmune diabetes mellitus in adults) (H)     Adjustment disorder with mixed anxiety and depressed mood     Upper back pain     Cervicalgia     Acute pain of right shoulder     Acute low back pain with radicular symptoms, duration less than 6 weeks     Chronic bilateral low back pain with left-sided sciatica       Current Outpatient Medications   Medication Sig Dispense Refill     ACCU-CHEK GUIDE test strip TEST BLOOD SUGAR TWO TIMES A DAY OR AS DIRECTED 200 strip 2     blood glucose monitoring (ACCU-CHEK FASTCLIX) lancets TEST TWO TIMES A  each 3     Continuous Blood Gluc Sensor (FREESTYLE NAVEED 2 SENSOR) MISC 1 each every 14 days 1 each every 14 days. Change every 14 days. 2 each 5     DiphenhydrAMINE HCl (BENADRYL ALLERGY PO) Take 1 tablet by mouth daily       fluticasone (FLONASE) 50 MCG/ACT nasal spray Spray 2 sprays into both nostrils daily 16 g 3     hydrOXYzine (ATARAX) 25 MG tablet Take one po TID 90 tablet 1     insulin aspart (NOVOLOG FLEXPEN) 100 UNIT/ML pen INJECT 1 UNIT UNDER THE SKIN PER 15 GRAMS OF CARB PLUS 1 UNIT PER 50 ABOVE 150 (MAX DOSE 50 UNITS) 45 mL 3     insulin glargine (LANTUS SOLOSTAR) 100 UNIT/ML pen INJECT 15 UNITS SUBCUTANEOUS EVERY MORNING 15 mL 4     insulin glargine (LANTUS SOLOSTAR) 100 UNIT/ML pen INJECT 15 UNITS UNDER THE SKIN EVERY MORNING 15 mL 3     insulin pen needle (BD MICH U/F) 32G X 4 MM miscellaneous USE 4 PEN NEEDLES DAILY OR AS DIRECTED 400 each 3     omeprazole (PRILOSEC) 20 MG DR capsule Take 20 mg po daily 30 capsule 0     polyethylene glycol (MIRALAX) powder Take 17 g (1 capful) by mouth 2 times daily as needed for constipation 510 g 1     urea (GORMEL) 20 % external cream Apply topically daily To feet. (Patient not taking: Reported on 12/15/2022) 120 g 5       No Known Allergies     EXAM  /89 (BP Location: Left arm, Patient Position: Sitting, Cuff Size: Adult Regular)   Pulse 76   Temp 97  F (36.1  C) (Temporal)   Ht 1.829 m (6')   Wt  74.5 kg (164 lb 4 oz)   SpO2 98%   BMI 22.28 kg/m    Gen: Alert, pleasant, NAD  Mouth: Bilateral teeth indentations/ridges at the left buccal mucosa, no suspicious findings, tissue looks pink.  COR: S1,S2, no murmur  Abdomen: soft, +midepigastric tenderness, normal bowel sounds, no HSM      Assessment:  (K21.9) Gastroesophageal reflux disease without esophagitis  (K22.2) Schatzki's ring  (primary encounter diagnosis)  (K44.9) Hiatal hernia  Comment: Currently taking omeprazole 20 mg daily. Having new symptoms of dysphagia with liquids, 3x in the last year. Increasing reflux with large meals or after eating near bedtime.  Plan: omeprazole (PRILOSEC) 20 MG DR capsule, Adult         GI  Referral - Procedure Only        Medication refilled. Increase omeprazole  to 20 mg BID. Recommended avoiding large meals and eating no later than 2 hours before bed. Discussed option of sleeping in a reclining position rather than while laying flat. Referred for upper endoscopy as I suspect he may need dilation of the ring.    (M79.10) Muscle ache  Comment: Quadricep muscle pain after increasing workout with squats.  Plan: Discussed the process of muscle fiber breakdown and rebuilding that occurs with exercise. Recommended staying at current level of exercise for a while before increasing and keeping up good hydration.    (Z23) Need for influenza vaccination  Comment: Routine vaccination, per patient request.  Plan: INFLUENZA VACCINE IM > 6 MONTHS VALENT IIV4         (FLUZONE)        Administered today.     Need for vaccination: Recommend Covid bivalent booster at a pharmacy and Pneumovax 20 vaccine at a future nurse visit.     (K13.6) Irritation of oral cavity  Comment: Intermittent irritation at the buccal mucosa, left cheek. Exam is normal. Suspect this is from teeth against tissue.  Plan: Reassurance given, may gargle with warm salt water or broth. Avoid Listerine use when flaring.     41 minutes spend on the date of  this encounter doing chart review, history and exam, documentation and further activities as noted above.     Rosamaria Banerjee MD  Internal Medicine/Pediatrics      I, Mar Sloan, am serving as a scribe to document services personally performed by Dr. Rosamaria Banerjee, based on data collection and the provider's statements to me. Dr. Banerjee has reviewed, edited, and approved the above note.

## 2022-12-21 ASSESSMENT — ENCOUNTER SYMPTOMS: MYALGIAS: 1

## 2022-12-22 ENCOUNTER — OFFICE VISIT (OUTPATIENT)
Dept: ENDOCRINOLOGY | Facility: CLINIC | Age: 47
End: 2022-12-22
Payer: COMMERCIAL

## 2022-12-22 DIAGNOSIS — M20.5X1 HALLUX LIMITUS OF RIGHT FOOT: ICD-10-CM

## 2022-12-22 DIAGNOSIS — B35.3 TINEA PEDIS OF BOTH FEET: ICD-10-CM

## 2022-12-22 DIAGNOSIS — L84 TYPE 1 DIABETES MELLITUS WITH PRESSURE CALLUS (H): Primary | ICD-10-CM

## 2022-12-22 DIAGNOSIS — M20.5X2 HALLUX LIMITUS OF LEFT FOOT: ICD-10-CM

## 2022-12-22 DIAGNOSIS — E10.628 TYPE 1 DIABETES MELLITUS WITH PRESSURE CALLUS (H): Primary | ICD-10-CM

## 2022-12-22 PROCEDURE — 99214 OFFICE O/P EST MOD 30 MIN: CPT | Performed by: PODIATRIST

## 2022-12-22 RX ORDER — CICLOPIROX OLAMINE 7.7 MG/G
CREAM TOPICAL 2 TIMES DAILY
Qty: 90 G | Refills: 5 | Status: SHIPPED | OUTPATIENT
Start: 2022-12-22 | End: 2023-12-14

## 2022-12-22 NOTE — PROGRESS NOTES
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)     Adjustment disorder with mixed anxiety and depressed mood     Upper back pain     Cervicalgia     Acute pain of right shoulder     Acute low back pain with radicular symptoms, duration less than 6 weeks     Chronic bilateral low back pain with left-sided sciatica     Past Surgical History:   Procedure Laterality Date     ESOPHAGOSCOPY, GASTROSCOPY, DUODENOSCOPY (EGD), COMBINED N/A 12/1/2020    Procedure: ESOPHAGOGASTRODUODENOSCOPY, WITH BIOPSY;  Surgeon: Fabio Mota DO;  Location: [facility] OR     NO HISTORY OF SURGERY       Social History     Socioeconomic History     Marital status:      Spouse name: Not on file     Number of children: 3     Years of education: Not on file     Highest education level: Not on file   Occupational History     Occupation: medical transport   Tobacco Use     Smoking status: Never     Smokeless tobacco: Never   Substance and Sexual Activity     Alcohol use: No     Drug use: No     Sexual activity: Yes     Partners: Female     Comment:    Other Topics Concern     Parent/sibling w/ CABG, MI or angioplasty before 65F 55M? Not Asked   Social History Narrative     Not on file     Social Determinants of Health     Financial Resource Strain: Not on file   Food Insecurity: Not on file   Transportation Needs: Not on file   Physical Activity: Not on file   Stress: Not on file   Social Connections: Not on file   Intimate Partner Violence: Not on file   Housing Stability: Not on file     Family History   Problem Relation Age of Onset     Hypertension Father      Diabetes Father      Heart Disease Father 75     Heart Failure Father      Aortic stenosis Father      Glaucoma No family hx of      Macular  Degeneration No family hx of      Lab Results   Component Value Date    A1C 7.9 09/21/2022    A1C 6.7 02/25/2022    A1C 7.0 10/05/2021    A1C 7.3 06/30/2021    A1C 7.2 11/28/2020    A1C 7.6 09/17/2020    A1C 6.9 03/04/2019    A1C 6.3 12/11/2017     Creatinine   Date Value Ref Range Status   09/21/2022 0.74 0.66 - 1.25 mg/dL Final   11/28/2020 0.89 0.66 - 1.25 mg/dL Final       Answers for HPI/ROS submitted by the patient on 12/21/2022  General Symptoms: No  Skin Symptoms: No  HENT Symptoms: No  EYE SYMPTOMS: No  HEART SYMPTOMS: No  LUNG SYMPTOMS: No  INTESTINAL SYMPTOMS: No  URINARY SYMPTOMS: No  REPRODUCTIVE SYMPTOMS: No  SKELETAL SYMPTOMS: Yes  BLOOD SYMPTOMS: No  NERVOUS SYSTEM SYMPTOMS: No  MENTAL HEALTH SYMPTOMS: No  Muscle aches: Yes          SUBJECTIVE FINDINGS:  47-year-old presents from Endocrinology for diabetic foot cares.  He relates to no ulcers or sores since we have seen him last.  Relates his blood sugars have been good.  He has no numbness, tingling or neuropathy in his feet.  Relates no injuries.  Relates he did get his diabetic shoes.  He has been wearing those and those work well.  Relates he did not get the urea cream we had prescribed previously because it was not covered by insurance, so he has been using some over-the-counter moisturizer.    OBJECTIVE FINDINGS:  DP and PT are 2/4 bilaterally.  There are no gross tendon voids bilaterally.  No erythema, no drainage, no odor, no calor bilaterally.  Deep tendon reflexes are intact bilaterally.  Sharp/dull is intact with 5.07 Macon-Malissa monofilament bilaterally.  He has mild hyperkeratotic tissue buildup plantar medial hallux bilaterally, plantar first MPJ, right, and minimally plantar first MPJ, left, and 2-4 MPJs, left.  He has functional hallux limitus present bilaterally.  Proprioception is intact bilaterally.  He has dry skin bilaterally with mild tinea pedis changes.  He has dorsally contracted digits 2 through 5  bilaterally.    ASSESSMENT AND PLAN:  He is diabetic.  He does have some callus on both feet.  He has functional hallux limitus with some hammertoe changes bilaterally.  He has dry skin with tinea pedis changes.  Diagnosis and treatment options discussed with him.  Prescription for Loprox cream given and use discussed with him.  Prescription for new diabetic shoes and inserts given.  He is given the phone number and address to [facility] to pick those up. Return to clinic and see me in about 3 months.  Previous notes reviewed.          Moderate level of medical decision making.

## 2022-12-23 ENCOUNTER — TELEPHONE (OUTPATIENT)
Dept: GASTROENTEROLOGY | Facility: CLINIC | Age: 47
End: 2022-12-23

## 2022-12-23 NOTE — TELEPHONE ENCOUNTER
Screening Questions  BLUE  KIND OF PREP RED  LOCATION [review exclusion criteria] GREEN  SEDATION TYPE        y Are you active on mychart?       Rosamaria Juarez Ordering/Referring Provider?        [employer] What type of coverage do you have?      n Have you had a positive covid test in the last 14 days?     21.8 1. BMI  [BMI 40+ - review exclusion criteria]    y  2. Are you able to give consent for your medical care? [IF NO,RN REVIEW]        n  3. Are you taking any prescription pain medications on a routine schedule?        3a. EXTENDED PREP What kind of prescription?     n 4. Do you have any chemical dependencies such as alcohol, street drugs, or methadone?    n 5. Do you have any history of post-traumatic stress syndrome, severe anxiety or history of psychosis?      **If yes 3- 5 , please schedule with MAC sedation.**          IF YES TO ANY 6 - 10 - HOSPITAL SETTING ONLY.     nn 6.   Do you need assistance transferring?     n 7.   Have you had a heart or lung transplant?    n 8.   Are you currently on dialysis?   n 9.   Do you use daily home oxygen?   n 10. Do you take nitroglycerin?   10a.  If yes, how often?     11. [FEMALES]  n Are you currently pregnant?    11a.  If yes, how many weeks? [ Greater than 12 weeks, OR NEEDED]    n 12. Do you have Pulmonary Hypertension? *NEED PAC APPT AT [facility]*     n 13. [review exclusion criteria]  Do you have any implantable devices in your body (pacemaker, defib, LVAD)?    n 14. In the past 6 months, have you had any heart related issues including cardiomyopathy or heart attack?     14a.  If yes, did it require cardiac stenting if so when?     n 15. Have you had a stroke or Transient ischemic attack (TIA - aka  mini stroke ) within 6 months?      n 16. Do you have mod to severe Obstructive Sleep Apnea?  [Hospital only - [facility] at [facility]]    n 17. Do you have SEVERE AND UNCONTROLLED asthma? *NEED PAC APPT AT [facility]*     n 18. Are you currently taking any blood thinners?     18a.  "If yes, inform patient to \"follow up w/ ordering provider for bridging instructions.\"    n 19. Do you take the medication Phentermine?    19a. If yes, \"Hold for 7 days before procedure.  Please consult your prescribing provider if you have questions about holding this medication.\"     n  20. Do you have chronic kidney disease?      y  21. Do you have a diagnosis of diabetes?     y  22. On a regular basis do you go 3-5 days between bowel movements?     y 23. Preferred LOCAL Pharmacy for Pre Prescription    [ LIST ONLY ONE PHARMACY]          [place], [place] - [address]        - CLOSING REMINDERS -    Informed patient they will need an adult    Cannot take any type of public or medical transportation alone    Conscious Sedation- Needs  for 6 hours after the procedure       MAC/General-Needs  for 24 hours after procedure    Pre-Procedure Covid test to be completed [[facility] PCR Testing Required]    Confirmed Nurse will call to complete assessment       - SCHEDULING DETAILS -  n Hospital Setting Required? If yes, what is the exclusion?: darron Stafford  Surgeon    1/4/23  Date of Procedure  Upper Endoscopy [EGD]  Type of Procedure Scheduled  [facility] [place] Location      moderate Sedation Type     n PAC / Pre-op Required               "

## 2022-12-26 ENCOUNTER — TELEPHONE (OUTPATIENT)
Dept: GASTROENTEROLOGY | Facility: CLINIC | Age: 47
End: 2022-12-26

## 2022-12-26 NOTE — TELEPHONE ENCOUNTER
Called the Pt to discuss below. No answer, LM.     Patient scheduled for upper endoscopy (EGD) on 1/3/23.     Discuss Covid policy.     Arrival time: 1015    Facility location: [facility]; [address] [place] [address], [address] Floor, [place] [address]    Sedation type: Conscious sedation     Anticoagulations? No    Electronic implanted devices? No    Diabetic? Yes - Patient to hold oral diabetic medications day of procedure    Indication for procedure: dysphagia to liquids, known hiatal and schatzke ring, on continuous PPI    Prep instructions sent via [employer] Pre visit planning completed.    Celena Damon RN

## 2022-12-26 NOTE — TELEPHONE ENCOUNTER
Pre assessment questions completed for upcoming upper endoscopy (EGD) procedure scheduled on 1/3/23    COVID policy reviewed.     Pre-op scheduled  N/A    Reviewed procedural arrival time 1015 and facility location [facility]; [address] [place] [address], [address] [facility], [place] [address]    Designated  policy reviewed. Instructed to have someone stay 6 hours post procedure.     Reviewed procedure prep instructions.      Patient verbalized understanding and had no questions or concerns at this time.    CHLOE CARREON RN

## 2023-01-03 ENCOUNTER — HOSPITAL ENCOUNTER (OUTPATIENT)
Facility: AMBULATORY SURGERY CENTER | Age: 48
Discharge: HOME | End: 2023-01-03
Attending: INTERNAL MEDICINE
Payer: COMMERCIAL

## 2023-01-03 ENCOUNTER — ANESTHESIA (OUTPATIENT)
Dept: SURGERY | Facility: AMBULATORY SURGERY CENTER | Age: 48
End: 2023-01-03
Payer: COMMERCIAL

## 2023-01-03 ENCOUNTER — ANESTHESIA EVENT (OUTPATIENT)
Dept: SURGERY | Facility: AMBULATORY SURGERY CENTER | Age: 48
End: 2023-01-03
Payer: COMMERCIAL

## 2023-01-03 VITALS
SYSTOLIC BLOOD PRESSURE: 111 MMHG | HEIGHT: 72 IN | TEMPERATURE: 97.4 F | OXYGEN SATURATION: 98 % | RESPIRATION RATE: 16 BRPM | DIASTOLIC BLOOD PRESSURE: 74 MMHG | WEIGHT: 164 LBS | BODY MASS INDEX: 22.21 KG/M2 | HEART RATE: 73 BPM

## 2023-01-03 VITALS — HEART RATE: 90 BPM

## 2023-01-03 LAB
GLUCOSE BLDC GLUCOMTR-MCNC: 113 MG/DL (ref 70–99)
GLUCOSE BLDC GLUCOMTR-MCNC: 128 MG/DL (ref 70–99)
UPPER GI ENDOSCOPY: NORMAL

## 2023-01-03 PROCEDURE — 43235 EGD DIAGNOSTIC BRUSH WASH: CPT

## 2023-01-03 PROCEDURE — 82962 GLUCOSE BLOOD TEST: CPT | Performed by: PATHOLOGY

## 2023-01-03 RX ORDER — ONDANSETRON 2 MG/ML
4 INJECTION INTRAMUSCULAR; INTRAVENOUS EVERY 6 HOURS PRN
Status: DISCONTINUED | OUTPATIENT
Start: 2023-01-03 | End: 2023-01-04 | Stop reason: HOSPADM

## 2023-01-03 RX ORDER — NALOXONE HYDROCHLORIDE 0.4 MG/ML
0.4 INJECTION, SOLUTION INTRAMUSCULAR; INTRAVENOUS; SUBCUTANEOUS
Status: DISCONTINUED | OUTPATIENT
Start: 2023-01-03 | End: 2023-01-04 | Stop reason: HOSPADM

## 2023-01-03 RX ORDER — LIDOCAINE 40 MG/G
CREAM TOPICAL
Status: DISCONTINUED | OUTPATIENT
Start: 2023-01-03 | End: 2023-01-03 | Stop reason: HOSPADM

## 2023-01-03 RX ORDER — LIDOCAINE HYDROCHLORIDE 20 MG/ML
INJECTION, SOLUTION INFILTRATION; PERINEURAL PRN
Status: DISCONTINUED | OUTPATIENT
Start: 2023-01-03 | End: 2023-01-03

## 2023-01-03 RX ORDER — FLUMAZENIL 0.1 MG/ML
0.2 INJECTION, SOLUTION INTRAVENOUS
Status: ACTIVE | OUTPATIENT
Start: 2023-01-03 | End: 2023-01-03

## 2023-01-03 RX ORDER — PROPOFOL 10 MG/ML
INJECTION, EMULSION INTRAVENOUS PRN
Status: DISCONTINUED | OUTPATIENT
Start: 2023-01-03 | End: 2023-01-03

## 2023-01-03 RX ORDER — PROCHLORPERAZINE MALEATE 10 MG
10 TABLET ORAL EVERY 6 HOURS PRN
Status: DISCONTINUED | OUTPATIENT
Start: 2023-01-03 | End: 2023-01-04 | Stop reason: HOSPADM

## 2023-01-03 RX ORDER — ONDANSETRON 2 MG/ML
4 INJECTION INTRAMUSCULAR; INTRAVENOUS
Status: DISCONTINUED | OUTPATIENT
Start: 2023-01-03 | End: 2023-01-03 | Stop reason: HOSPADM

## 2023-01-03 RX ORDER — PROPOFOL 10 MG/ML
INJECTION, EMULSION INTRAVENOUS CONTINUOUS PRN
Status: DISCONTINUED | OUTPATIENT
Start: 2023-01-03 | End: 2023-01-03

## 2023-01-03 RX ORDER — ONDANSETRON 4 MG/1
4 TABLET, ORALLY DISINTEGRATING ORAL EVERY 6 HOURS PRN
Status: DISCONTINUED | OUTPATIENT
Start: 2023-01-03 | End: 2023-01-04 | Stop reason: HOSPADM

## 2023-01-03 RX ORDER — NALOXONE HYDROCHLORIDE 0.4 MG/ML
0.2 INJECTION, SOLUTION INTRAMUSCULAR; INTRAVENOUS; SUBCUTANEOUS
Status: DISCONTINUED | OUTPATIENT
Start: 2023-01-03 | End: 2023-01-04 | Stop reason: HOSPADM

## 2023-01-03 RX ORDER — SODIUM CHLORIDE, SODIUM LACTATE, POTASSIUM CHLORIDE, CALCIUM CHLORIDE 600; 310; 30; 20 MG/100ML; MG/100ML; MG/100ML; MG/100ML
INJECTION, SOLUTION INTRAVENOUS CONTINUOUS PRN
Status: DISCONTINUED | OUTPATIENT
Start: 2023-01-03 | End: 2023-01-03

## 2023-01-03 RX ADMIN — PROPOFOL 50 MG: 10 INJECTION, EMULSION INTRAVENOUS at 11:20

## 2023-01-03 RX ADMIN — SODIUM CHLORIDE, SODIUM LACTATE, POTASSIUM CHLORIDE, CALCIUM CHLORIDE: 600; 310; 30; 20 INJECTION, SOLUTION INTRAVENOUS at 11:16

## 2023-01-03 RX ADMIN — LIDOCAINE HYDROCHLORIDE 60 MG: 20 INJECTION, SOLUTION INFILTRATION; PERINEURAL at 11:19

## 2023-01-03 RX ADMIN — LIDOCAINE HYDROCHLORIDE 40 MG: 20 INJECTION, SOLUTION INFILTRATION; PERINEURAL at 11:21

## 2023-01-03 RX ADMIN — PROPOFOL 350 MCG/KG/MIN: 10 INJECTION, EMULSION INTRAVENOUS at 11:19

## 2023-01-03 NOTE — ANESTHESIA PREPROCEDURE EVALUATION
Anesthesia Pre-Procedure Evaluation    Patient: Piter Conway   MRN: [id number] : 1975        Procedure : Procedure(s):  ESOPHAGOGASTRODUODENOSCOPY (EGD)          Past Medical History:   Diagnosis Date     Diabetes (H) 2014     History of hepatitis A       Past Surgical History:   Procedure Laterality Date     ESOPHAGOSCOPY, GASTROSCOPY, DUODENOSCOPY (EGD), COMBINED N/A 2020    Procedure: ESOPHAGOGASTRODUODENOSCOPY, WITH BIOPSY;  Surgeon: Fabio Mota DO;  [place] [facility] [place]     NO HISTORY OF SURGERY        No Known Allergies   Social History     Tobacco Use     Smoking status: Never     Smokeless tobacco: Never   Substance Use Topics     Alcohol use: No      Wt Readings from Last 1 Encounters:   23 74.4 kg (164 lb)        Anesthesia Evaluation            ROS/MED HX  ENT/Pulmonary:       Neurologic:       Cardiovascular:       METS/Exercise Tolerance:     Hematologic:       Musculoskeletal:       GI/Hepatic:       Renal/Genitourinary:       Endo:     (+) type I DM,     Psychiatric/Substance Use:       Infectious Disease:       Malignancy:       Other:            Physical Exam    Airway        Mallampati: II       Respiratory Devices and Support         Dental           Cardiovascular          Rhythm and rate: regular     Pulmonary           breath sounds clear to auscultation           OUTSIDE LABS:  CBC:   Lab Results   Component Value Date    WBC 4.7 2016    WBC 3.3 (L) 2016    HGB 15.9 2021    HGB 15.0 2020    HCT 47.6 2020    HCT 46.5 2019     2016     2016     BMP:   Lab Results   Component Value Date     2020    .0 2020    POTASSIUM 4.5 2020    POTASSIUM 4.2 2020    CHLORIDE 103 2020    CHLORIDE 101.0 2020    CO2 30 2020    CO2 29.0 2020    BUN 11.0 2020    BUN 15.0 2019    CR 0.74 2022    CR 0.89 2020    .0 (H) 2020     (H)  11/14/2019     COAGS:   Lab Results   Component Value Date    INR 1.09 01/04/2006     POC:   Lab Results   Component Value Date     (H) 12/01/2020     HEPATIC:   Lab Results   Component Value Date    ALBUMIN 3.4 09/17/2020    PROTTOTAL 6.9 09/17/2020    ALT 49 11/28/2020    AST 11 09/21/2022    ALKPHOS 119.0 09/17/2020    BILITOTAL 0.4 09/17/2020     OTHER:   Lab Results   Component Value Date    A1C 7.9 (H) 09/21/2022    [place] 9.2 09/17/2020    LIPASE 121 03/04/2019    AMYLASE 98 03/04/2019    TSH 1.39 09/21/2022    T4 1.12 12/11/2017       Anesthesia Plan    ASA Status:  2   NPO Status:  NPO Appropriate    Anesthesia Type: MAC.              Consents    Anesthesia Plan(s) and associated risks, benefits, and realistic alternatives discussed. Questions answered and patient/representative(s) expressed understanding.    - Discussed:     - Discussed with:  Patient         Postoperative Care            Comments:                Yevgeniy Martínez MD

## 2023-01-03 NOTE — H&P
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)     Adjustment disorder with mixed anxiety and depressed mood     Upper back pain     Cervicalgia     Acute pain of right shoulder     Acute low back pain with radicular symptoms, duration less than 6 weeks     Chronic bilateral low back pain with left-sided sciatica       Past Surgical History:    Past Surgical History:   Procedure Laterality Date     ESOPHAGOSCOPY, GASTROSCOPY, DUODENOSCOPY (EGD), COMBINED N/A 12/1/2020    Procedure: ESOPHAGOGASTRODUODENOSCOPY, WITH BIOPSY;  Surgeon: Fabio Mota DO;  Location: [facility] OR     NO HISTORY OF SURGERY         Past Medical History:   Past Medical History:   Diagnosis Date     Diabetes (H) 2014     History of hepatitis A 2001       Social History:   Social History     Tobacco Use     Smoking status: Never     Smokeless tobacco: Never   Substance Use Topics     Alcohol use: No       Family History:   Family History   Problem Relation Age of Onset     Hypertension Father      Diabetes Father      Heart Disease Father 75     Heart Failure Father      Aortic stenosis Father      Glaucoma No family hx of      Macular Degeneration No family hx of        Allergies: No Known Allergies    Active Medications:   Current Outpatient Medications   Medication Sig Dispense Refill     ciclopirox (LOPROX) 0.77 % cream Apply topically 2 times daily To feet. 90 g 5     DiphenhydrAMINE HCl (BENADRYL ALLERGY PO) Take 1 tablet by mouth daily       fluticasone (FLONASE) 50 MCG/ACT nasal spray Spray 2 sprays into both nostrils daily 16 g 3     hydrOXYzine (ATARAX) 25 MG tablet Take one po TID 90 tablet 1     insulin aspart (NOVOLOG FLEXPEN) 100 UNIT/ML pen INJECT 1 UNIT UNDER THE SKIN PER 15 GRAMS OF CARB PLUS 1  UNIT PER 50 ABOVE 150 (MAX DOSE 50 UNITS) 45 mL 3     insulin glargine (LANTUS SOLOSTAR) 100 UNIT/ML pen INJECT 15 UNITS SUBCUTANEOUS EVERY MORNING 15 mL 4     insulin glargine (LANTUS SOLOSTAR) 100 UNIT/ML pen INJECT 15 UNITS UNDER THE SKIN EVERY MORNING 15 mL 3     omeprazole (PRILOSEC) 20 MG DR capsule Take 20 mg po twice daily 180 capsule 3     polyethylene glycol (MIRALAX) powder Take 17 g (1 capful) by mouth 2 times daily as needed for constipation 510 g 1     ACCU-CHEK GUIDE test strip TEST BLOOD SUGAR TWO TIMES A DAY OR AS DIRECTED 200 strip 2     blood glucose monitoring (ACCU-CHEK FASTCLIX) lancets TEST TWO TIMES A  each 3     Continuous Blood Gluc Sensor (FREESTYLE NAVEED 2 SENSOR) MISC 1 each every 14 days 1 each every 14 days. Change every 14 days. 2 each 5     insulin pen needle (BD MICH U/F) 32G X 4 MM miscellaneous USE 4 PEN NEEDLES DAILY OR AS DIRECTED 400 each 3     urea (GORMEL) 20 % external cream Apply topically daily To feet. (Patient not taking: Reported on 12/15/2022) 120 g 5       Systemic Review:   ROS otherwise negative    Physical Examination:   Vital Signs: /85 (BP Location: Right arm)   Pulse 73   Temp 96.8  F (36  C) (Temporal)   Resp 18   Ht 1.829 m (6')   Wt 74.4 kg (164 lb)   SpO2 99%   BMI 22.24 kg/m    GENERAL: healthy, alert and no distress  HENT: oropharynx clear and oral mucous membranes moist, Mallampati II  NECK: Normal ROM  RESP: lungs clear to auscultation - no rales, rhonchi or wheezes  CV: regular rate and rhythm, normal S1 S2,   ABDOMEN: soft, nontender, and bowel sounds normal  MS: no gross musculoskeletal defects noted, no edema      Plan: Appropriate to proceed as scheduled.      Sarah Stafford MD  1/3/2023    PCP:  Rosamaria Banerjee

## 2023-01-03 NOTE — ANESTHESIA CARE TRANSFER NOTE
Patient: Piter Conway    Procedure: Procedure(s):  ESOPHAGOGASTRODUODENOSCOPY (EGD)       Diagnosis: Gastroesophageal reflux disease without esophagitis [K21.9]  Diagnosis Additional Information: No value filed.    Anesthesia Type:   MAC     Note:    Oropharynx: oropharynx clear of all foreign objects and spontaneously breathing  Level of Consciousness: drowsy  Oxygen Supplementation: room air    Independent Airway: airway patency satisfactory and stable  Dentition: dentition unchanged  Vital Signs Stable: post-procedure vital signs reviewed and stable  Report to RN Given: handoff report given  Patient transferred to: Phase II  Comments: Report to Phase II RN. Resps easy and regular.   Handoff Report: Identifed the Patient, Identified the Reponsible Provider, Reviewed the pertinent medical history, Discussed the surgical course, Reviewed Intra-OP anesthesia mangement and issues during anesthesia, Set expectations for post-procedure period and Allowed opportunity for questions and acknowledgement of understanding      Vitals:  Vitals Value Taken Time   BP 87/60 01/03/23 1135   Temp 36.6  C (97.8  F) 01/03/23 1135   Pulse     Resp 14 01/03/23 1135   SpO2 96 % 01/03/23 1135       Electronically Signed By: HARMONY FELIPE CRNA  January 3, 2023  11:38 AM

## 2023-01-03 NOTE — ANESTHESIA POSTPROCEDURE EVALUATION
Patient: Piter Conway    Procedure: Procedure(s):  ESOPHAGOGASTRODUODENOSCOPY (EGD)       Anesthesia Type:  MAC    Note:  Disposition: Outpatient   Postop Pain Control: Uneventful            Sign Out: Well controlled pain   PONV: No   Neuro/Psych: Uneventful            Sign Out: Acceptable/Baseline neuro status   Airway/Respiratory: Uneventful            Sign Out: Acceptable/Baseline resp. status   CV/Hemodynamics: Uneventful            Sign Out: Acceptable CV status; No obvious hypovolemia; No obvious fluid overload   Other NRE: NONE   DID A NON-ROUTINE EVENT OCCUR?            Last vitals:  Vitals Value Taken Time   /74 01/03/23 1150   Temp 36.3  C (97.4  F) 01/03/23 1150   Pulse     Resp 16 01/03/23 1150   SpO2 98 % 01/03/23 1150       Electronically Signed By: Yevgeniy Martínez MD  January 3, 2023  12:24 PM

## 2023-01-10 DIAGNOSIS — E10.65 TYPE 1 DIABETES MELLITUS WITH HYPERGLYCEMIA (H): ICD-10-CM

## 2023-03-03 NOTE — PROGRESS NOTES
Outcome for 03/03/23 8:05 AM: Data uploaded on Claudia Akins LPN   BG data obtained via CGM website.  Azalia Vernon CMA on 3/9/2023 at 11:44 AM      Answers for HPI/ROS submitted by the patient on 3/10/2023  General Symptoms: No  Skin Symptoms: No  HENT Symptoms: No  EYE SYMPTOMS: No  HEART SYMPTOMS: No  LUNG SYMPTOMS: No  INTESTINAL SYMPTOMS: No  URINARY SYMPTOMS: No  REPRODUCTIVE SYMPTOMS: No  SKELETAL SYMPTOMS: No  BLOOD SYMPTOMS: No  NERVOUS SYSTEM SYMPTOMS: No  MENTAL HEALTH SYMPTOMS: No

## 2023-03-10 ENCOUNTER — VIRTUAL VISIT (OUTPATIENT)
Dept: ENDOCRINOLOGY | Facility: CLINIC | Age: 48
End: 2023-03-10
Payer: COMMERCIAL

## 2023-03-10 ENCOUNTER — TELEPHONE (OUTPATIENT)
Dept: ENDOCRINOLOGY | Facility: CLINIC | Age: 48
End: 2023-03-10

## 2023-03-10 DIAGNOSIS — E13.9 LADA (LATENT AUTOIMMUNE DIABETES MELLITUS IN ADULTS) (H): ICD-10-CM

## 2023-03-10 DIAGNOSIS — E10.65 TYPE 1 DIABETES MELLITUS WITH HYPERGLYCEMIA (H): Primary | ICD-10-CM

## 2023-03-10 PROCEDURE — 99214 OFFICE O/P EST MOD 30 MIN: CPT | Mod: VID | Performed by: PHYSICIAN ASSISTANT

## 2023-03-10 RX ORDER — INSULIN GLARGINE 100 [IU]/ML
INJECTION, SOLUTION SUBCUTANEOUS
Qty: 15 ML | Refills: 4 | Status: SHIPPED | OUTPATIENT
Start: 2023-03-10 | End: 2023-11-14

## 2023-03-10 RX ORDER — PEN NEEDLE, DIABETIC 32GX 5/32"
NEEDLE, DISPOSABLE MISCELLANEOUS
Qty: 400 EACH | Refills: 3 | Status: SHIPPED | OUTPATIENT
Start: 2023-03-10 | End: 2024-03-20

## 2023-03-10 RX ORDER — INSULIN ASPART 100 [IU]/ML
INJECTION, SOLUTION INTRAVENOUS; SUBCUTANEOUS
Qty: 45 ML | Refills: 3 | Status: SHIPPED | OUTPATIENT
Start: 2023-03-10 | End: 2024-04-09

## 2023-03-10 NOTE — TELEPHONE ENCOUNTER
PA Initiation    Medication: Freestyle Claudia 2 Sensor - PA Pending   Insurance Company: [employer] - Phone [phone] Fax [phone]  Pharmacy Filling the Rx: [facility] [place], [place] - [address]  Filling Pharmacy Phone: [phone]  Filling Pharmacy Fax: [phone]  Start Date: 3/10/2023          Thank you,     Heladio Diallo [facility]  Pharmacy Clinic [facility]  [email or web address]   Phone: [phone]  Fax: [phone]

## 2023-03-10 NOTE — NURSING NOTE
Is the patient currently in the state of [place]? YES    Visit mode:VIDEO    If the visit is dropped, the patient can be reconnected by: VIDEO VISIT: Text to cell phone: [phone]    Will anyone else be joining the visit? NO    How would you like to obtain your AVS? Mail a copy    Are changes needed to the allergy or medication list? NO    Reason for visit:   Chief Complaint   Patient presents with     Video Visit     SAMANTA; Type 1 Diabetes       Alana Nava MA

## 2023-03-10 NOTE — LETTER
3/10/2023       RE: Piter Conway  [address]  [place] [address]     Dear Colleague,    Thank you for referring your patient, Piter Conway, to the [facility] [place] at [facility]. Please see a copy of my visit note below.    Outcome for 03/03/23 8:05 AM: Data uploaded on Claudia Akins LPN   BG data obtained via CGM website.  Azalia Vernon CMA on 3/9/2023 at 11:44 AM      Answers for HPI/ROS submitted by the patient on 3/10/2023  General Symptoms: No  Skin Symptoms: No  HENT Symptoms: No  EYE SYMPTOMS: No  HEART SYMPTOMS: No  LUNG SYMPTOMS: No  INTESTINAL SYMPTOMS: No  URINARY SYMPTOMS: No  REPRODUCTIVE SYMPTOMS: No  SKELETAL SYMPTOMS: No  BLOOD SYMPTOMS: No  NERVOUS SYSTEM SYMPTOMS: No  MENTAL HEALTH SYMPTOMS: No        Due to the COVID 19 pandemic this visit was converted to a video visit in order to help prevent spread of infection in this patient and the general population.    Time of start: 2:04 pm   Time of end: 2:22 pm  Total duration of video visit: 18 minutes.    FLORIDALMA Conway is a 48 year old male with type 1 diabetes mellitus.  Video visit today for diabetes follow up.  Pt was started on insulin in Nov 2019.    His EJ was + with C-peptide 0.8 ng/mL.  Piter reports having mild numbness in both feet with has improved. He has no hx of retinopathy or nephropathy.  For his diabetes, he is currently taking Lantus 15 units subcutaneous each am and Novolog 1 unit/15 gms CHO with meals with correction insulin.  Pt's A1C was 7.9 % in 9/2022.  Previous A1C was 6.7 % on 2/25/2022.   His A1C was 10.6 % in 2014 time of diabetes diagnosis.  Piter continues to use his Freestyle Libre2 sensor. He does not have a computer at home, so he is unable to upload his Freestyle data.  I reviewed and scanned his Freestyle Libre2 sensor download data in his note below.  His blood sugars are in target 50 % of the time.  No frequent  hypoglycemia.  On ROS today, less numbness in feet. Denies foot ulcers.  He has gained weight since using insulin.  Pt denies frequent headaches,blurred vision, n/v, SOB,cough, fever, chills, chest pain,abd pain, diarrhea,dysuria or hematuria.    Diabetes Care  Retinopathy:none; he was seen by Oph in Nov 2022.  Nephropathy:none; pt's urine microalbuminuria negative in 9/2022.  Neuropathy: mild numbness in both feet.  Foot Exam:no exam today.  Taking aspirin:no  Lipids:  in 9/2022.  CAD: no.  Mental health: dx of adjustment disorder with mixed anxiety and depressed mood during the COVID pandemic which has improved per patient.  Insulin: Basal and meal time insulin with correction scale.  Testing: Freestyle Libre2 sensor.                     ROS  Please see under HPI.    Allergies  No Known Allergies    Medications  Current Outpatient Medications   Medication Sig Dispense Refill     ACCU-CHEK GUIDE test strip TEST BLOOD SUGAR TWO TIMES A DAY OR AS DIRECTED 200 strip 2     blood glucose monitoring (ACCU-CHEK FASTCLIX) lancets TEST TWO TIMES A  each 3     ciclopirox (LOPROX) 0.77 % cream Apply topically 2 times daily To feet. 90 g 5     Continuous Blood Gluc Sensor (FREESTYLE NAVEED 2 SENSOR) MISC Change every 14 days. 6 each 11     DiphenhydrAMINE HCl (BENADRYL ALLERGY PO) Take 1 tablet by mouth daily       fluticasone (FLONASE) 50 MCG/ACT nasal spray Spray 2 sprays into both nostrils daily 16 g 3     hydrOXYzine (ATARAX) 25 MG tablet Take one po TID 90 tablet 1     insulin aspart (NOVOLOG FLEXPEN) 100 UNIT/ML pen INJECT 1 UNIT UNDER THE SKIN PER 15 GRAMS OF CARB PLUS 1 UNIT PER 50 ABOVE 150 (MAX DOSE 50 UNITS) 45 mL 3     insulin glargine (LANTUS SOLOSTAR) 100 UNIT/ML pen INJECT 15 UNITS SUBCUTANEOUS EVERY MORNING 15 mL 4     insulin pen needle (BD MICH U/F) 32G X 4 MM miscellaneous USE 4 PEN NEEDLES DAILY OR AS DIRECTED 400 each 3     omeprazole (PRILOSEC) 20 MG DR capsule Take 20 mg po twice daily 180  capsule 3     polyethylene glycol (MIRALAX) powder Take 17 g (1 capful) by mouth 2 times daily as needed for constipation 510 g 1     urea (GORMEL) 20 % external cream Apply topically daily To feet. (Patient not taking: Reported on 12/15/2022) 120 g 5       Family History  family history includes Aortic stenosis in his father; Diabetes in his father; Heart Disease (age of onset: 75) in his father; Heart Failure in his father; Hypertension in his father.  He also has a younger brother with type 1 diabetes using an insulin pump.    Social History  Smoke: none.  ETOH: none.    Past Medical History    1. SAMANTA/DM 1 - dx 2014.  Past Medical History:   Diagnosis Date     Diabetes (H) 2014     History of hepatitis A 2001   Adjustment disorder with mixed anxiety and depressed mood.    Physical Exam    No exam.      RESULTS  Creatinine   Date Value Ref Range Status   09/21/2022 0.74 0.66 - 1.25 mg/dL Final   11/28/2020 0.89 0.66 - 1.25 mg/dL Final     GFR Estimate   Date Value Ref Range Status   09/21/2022 >90 >60 mL/min/1.73m2 Final     Comment:     Effective December 21, 2021 eGFRcr in adults is calculated using the 2021 CKD-EPI creatinine equation which includes age and gender (Mateo et al., NEJM, DOI: 10.1056/ODUPlz7913035)   11/28/2020 >90 >60 mL/min/[1.73_m2] Final     Comment:     Non  GFR Calc  Starting 12/18/2018, serum creatinine based estimated GFR (eGFR) will be   calculated using the Chronic Kidney Disease Epidemiology Collaboration   (CKD-EPI) equation.       Hemoglobin A1C   Date Value Ref Range Status   09/21/2022 7.9 (H) 0.0 - 5.6 % Final     Comment:     Normal <5.7%   Prediabetes 5.7-6.4%    Diabetes 6.5% or higher     Note: Adopted from ADA consensus guidelines.   06/30/2021 7.3 (H) 0 - 5.6 % Final     Comment:     Normal <5.7% Prediabetes 5.7-6.4%  Diabetes 6.5% or higher - adopted from ADA   consensus guidelines.       Potassium   Date Value Ref Range Status   11/28/2020 4.5 3.4 - 5.3  mmol/L Final     ALT   Date Value Ref Range Status   11/28/2020 49 0 - 70 U/L Final     AST   Date Value Ref Range Status   09/21/2022 11 0 - 45 U/L Final   11/28/2020 11 0 - 45 U/L Final     TSH   Date Value Ref Range Status   09/21/2022 1.39 0.40 - 4.00 mU/L Final   06/30/2021 1.19 0.40 - 4.00 mU/L Final     T4 Free   Date Value Ref Range Status   12/11/2017 1.12 0.76 - 1.46 ng/dL Final       Cholesterol   Date Value Ref Range Status   09/21/2022 157 <200 mg/dL Final   10/05/2021 165 <200 mg/dL Final   11/28/2020 159 <200 mg/dL Final   09/17/2020 154.0 0.0 - 200.0 Final     HDL Cholesterol   Date Value Ref Range Status   11/28/2020 43 >39 mg/dL Final   09/17/2020 46.0 >40.0 Final     Direct Measure HDL   Date Value Ref Range Status   09/21/2022 43 >=40 mg/dL Final   10/05/2021 45 >=40 mg/dL Final     LDL Cholesterol Calculated   Date Value Ref Range Status   09/21/2022 102 (H) <=100 mg/dL Final   10/05/2021 107 (H) <=100 mg/dL Final   11/28/2020 103 (H) <100 mg/dL Final     Comment:     Above desirable:  100-129 mg/dl  Borderline High:  130-159 mg/dL  High:             160-189 mg/dL  Very high:       >189 mg/dl     02/27/2020 82 <100 mg/dL Final     Comment:     Desirable:       <100 mg/dl     LDL Cholesterol Direct   Date Value Ref Range Status   09/17/2020 86.0 0.0 - 129.0 Final     Triglycerides   Date Value Ref Range Status   09/21/2022 61 <150 mg/dL Final   10/05/2021 66 <150 mg/dL Final   11/28/2020 68 <150 mg/dL Final   09/17/2020 107.0 0.0 - 150.0 Final     Cholesterol/HDL Ratio   Date Value Ref Range Status   09/17/2020 3.3 0.0 - 5.0 Final   08/27/2015 3.0 0.0 - 5.0 Final       ASSESSMENT/PLAN:    1.  TYPE 1 DIABETES MELLITUS:  Piter had a + EJ antibody and C-peptide 0.8 ng/mL with glucose 144 in Nov 2019.  He started taking insulin in Nov 2019 and he is doing much better and has gained weight.  Blood sugar values are higher postprandial.  Reminded him to take Novolog for all food intake and to take bolus  insulin before eating.  No change in insulin doses today.  He is to continue to monitor his blood sugar using his Freestyle Libre2 sensor.  Pt's urine microalbuminuria was negative in Sept 2021.  Piter was seen by Oph here in Nov 2022 without retinopathy.   He reports less numbness in both feet.  Pt's LDL was 102 in 9/2022. He has worked on reducing fat in his diet.  No vitals today. Recent BP was 111/74.    2. FOLLOW UP: with me in clinic in 4 months.  A1C ordered.    Time spent reviewing chart, labs and Freestyle Libre2 sensor download data  today = 6 minutes.  Time for video visit today =18  minutes.  Time for documentation today = 15 minutes.    TOTAL TIME FOR VISIT TODAY = 39  minutes.    Sheila Brewer PA-C    Answers for HPI/ROS submitted by the patient on 3/10/2023  General Symptoms: No  Skin Symptoms: No  HENT Symptoms: No  EYE SYMPTOMS: No  HEART SYMPTOMS: No  LUNG SYMPTOMS: No  INTESTINAL SYMPTOMS: No  URINARY SYMPTOMS: No  REPRODUCTIVE SYMPTOMS: No  SKELETAL SYMPTOMS: No  BLOOD SYMPTOMS: No  NERVOUS SYSTEM SYMPTOMS: No  MENTAL HEALTH SYMPTOMS: No

## 2023-03-13 NOTE — PROGRESS NOTES
Due to the COVID 19 pandemic this visit was converted to a video visit in order to help prevent spread of infection in this patient and the general population.    Time of start: 2:04 pm   Time of end: 2:22 pm  Total duration of video visit: 18 minutes.    HPI  Piter Conway is a 48 year old male with type 1 diabetes mellitus.  Video visit today for diabetes follow up.  Pt was started on insulin in Nov 2019.    His EJ was + with C-peptide 0.8 ng/mL.  Piter reports having mild numbness in both feet with has improved. He has no hx of retinopathy or nephropathy.  For his diabetes, he is currently taking Lantus 15 units subcutaneous each am and Novolog 1 unit/15 gms CHO with meals with correction insulin.  Pt's A1C was 7.9 % in 9/2022.  Previous A1C was 6.7 % on 2/25/2022.   His A1C was 10.6 % in 2014 time of diabetes diagnosis.  Piter continues to use his Freestyle Libre2 sensor. He does not have a computer at home, so he is unable to upload his Freestyle data.  I reviewed and scanned his Freestyle Libre2 sensor download data in his note below.  His blood sugars are in target 50 % of the time.  No frequent hypoglycemia.  On ROS today, less numbness in feet. Denies foot ulcers.  He has gained weight since using insulin.  Pt denies frequent headaches,blurred vision, n/v, SOB,cough, fever, chills, chest pain,abd pain, diarrhea,dysuria or hematuria.    Diabetes Care  Retinopathy:none; he was seen by Oph in Nov 2022.  Nephropathy:none; pt's urine microalbuminuria negative in 9/2022.  Neuropathy: mild numbness in both feet.  Foot Exam:no exam today.  Taking aspirin:no  Lipids:  in 9/2022.  CAD: no.  Mental health: dx of adjustment disorder with mixed anxiety and depressed mood during the COVID pandemic which has improved per patient.  Insulin: Basal and meal time insulin with correction scale.  Testing: Freestyle Libre2 sensor.                     ROS  Please see under HPI.    Allergies  No Known  Allergies    Medications  Current Outpatient Medications   Medication Sig Dispense Refill     ACCU-CHEK GUIDE test strip TEST BLOOD SUGAR TWO TIMES A DAY OR AS DIRECTED 200 strip 2     blood glucose monitoring (ACCU-CHEK FASTCLIX) lancets TEST TWO TIMES A  each 3     ciclopirox (LOPROX) 0.77 % cream Apply topically 2 times daily To feet. 90 g 5     Continuous Blood Gluc Sensor (FREESTYLE NAVEED 2 SENSOR) MISC Change every 14 days. 6 each 11     DiphenhydrAMINE HCl (BENADRYL ALLERGY PO) Take 1 tablet by mouth daily       fluticasone (FLONASE) 50 MCG/ACT nasal spray Spray 2 sprays into both nostrils daily 16 g 3     hydrOXYzine (ATARAX) 25 MG tablet Take one po TID 90 tablet 1     insulin aspart (NOVOLOG FLEXPEN) 100 UNIT/ML pen INJECT 1 UNIT UNDER THE SKIN PER 15 GRAMS OF CARB PLUS 1 UNIT PER 50 ABOVE 150 (MAX DOSE 50 UNITS) 45 mL 3     insulin glargine (LANTUS SOLOSTAR) 100 UNIT/ML pen INJECT 15 UNITS SUBCUTANEOUS EVERY MORNING 15 mL 4     insulin pen needle (BD MICH U/F) 32G X 4 MM miscellaneous USE 4 PEN NEEDLES DAILY OR AS DIRECTED 400 each 3     omeprazole (PRILOSEC) 20 MG DR capsule Take 20 mg po twice daily 180 capsule 3     polyethylene glycol (MIRALAX) powder Take 17 g (1 capful) by mouth 2 times daily as needed for constipation 510 g 1     urea (GORMEL) 20 % external cream Apply topically daily To feet. (Patient not taking: Reported on 12/15/2022) 120 g 5       Family History  family history includes Aortic stenosis in his father; Diabetes in his father; Heart Disease (age of onset: 75) in his father; Heart Failure in his father; Hypertension in his father.  He also has a younger brother with type 1 diabetes using an insulin pump.    Social History  Smoke: none.  ETOH: none.    Past Medical History    1. SAMANTA/DM 1 - dx 2014.  Past Medical History:   Diagnosis Date     Diabetes (H) 2014     History of hepatitis A 2001   Adjustment disorder with mixed anxiety and depressed mood.    Physical Exam    No  exam.      RESULTS  Creatinine   Date Value Ref Range Status   09/21/2022 0.74 0.66 - 1.25 mg/dL Final   11/28/2020 0.89 0.66 - 1.25 mg/dL Final     GFR Estimate   Date Value Ref Range Status   09/21/2022 >90 >60 mL/min/1.73m2 Final     Comment:     Effective December 21, 2021 eGFRcr in adults is calculated using the 2021 CKD-EPI creatinine equation which includes age and gender (Mateo et al., NE, DOI: 10.1056/RCXZsh7225378)   11/28/2020 >90 >60 mL/min/[1.73_m2] Final     Comment:     Non  GFR Calc  Starting 12/18/2018, serum creatinine based estimated GFR (eGFR) will be   calculated using the Chronic Kidney Disease Epidemiology Collaboration   (CKD-EPI) equation.       Hemoglobin A1C   Date Value Ref Range Status   09/21/2022 7.9 (H) 0.0 - 5.6 % Final     Comment:     Normal <5.7%   Prediabetes 5.7-6.4%    Diabetes 6.5% or higher     Note: Adopted from ADA consensus guidelines.   06/30/2021 7.3 (H) 0 - 5.6 % Final     Comment:     Normal <5.7% Prediabetes 5.7-6.4%  Diabetes 6.5% or higher - adopted from ADA   consensus guidelines.       Potassium   Date Value Ref Range Status   11/28/2020 4.5 3.4 - 5.3 mmol/L Final     ALT   Date Value Ref Range Status   11/28/2020 49 0 - 70 U/L Final     AST   Date Value Ref Range Status   09/21/2022 11 0 - 45 U/L Final   11/28/2020 11 0 - 45 U/L Final     TSH   Date Value Ref Range Status   09/21/2022 1.39 0.40 - 4.00 mU/L Final   06/30/2021 1.19 0.40 - 4.00 mU/L Final     T4 Free   Date Value Ref Range Status   12/11/2017 1.12 0.76 - 1.46 ng/dL Final       Cholesterol   Date Value Ref Range Status   09/21/2022 157 <200 mg/dL Final   10/05/2021 165 <200 mg/dL Final   11/28/2020 159 <200 mg/dL Final   09/17/2020 154.0 0.0 - 200.0 Final     HDL Cholesterol   Date Value Ref Range Status   11/28/2020 43 >39 mg/dL Final   09/17/2020 46.0 >40.0 Final     Direct Measure HDL   Date Value Ref Range Status   09/21/2022 43 >=40 mg/dL Final   10/05/2021 45 >=40 mg/dL Final      LDL Cholesterol Calculated   Date Value Ref Range Status   09/21/2022 102 (H) <=100 mg/dL Final   10/05/2021 107 (H) <=100 mg/dL Final   11/28/2020 103 (H) <100 mg/dL Final     Comment:     Above desirable:  100-129 mg/dl  Borderline High:  130-159 mg/dL  High:             160-189 mg/dL  Very high:       >189 mg/dl     02/27/2020 82 <100 mg/dL Final     Comment:     Desirable:       <100 mg/dl     LDL Cholesterol Direct   Date Value Ref Range Status   09/17/2020 86.0 0.0 - 129.0 Final     Triglycerides   Date Value Ref Range Status   09/21/2022 61 <150 mg/dL Final   10/05/2021 66 <150 mg/dL Final   11/28/2020 68 <150 mg/dL Final   09/17/2020 107.0 0.0 - 150.0 Final     Cholesterol/HDL Ratio   Date Value Ref Range Status   09/17/2020 3.3 0.0 - 5.0 Final   08/27/2015 3.0 0.0 - 5.0 Final       ASSESSMENT/PLAN:    1.  TYPE 1 DIABETES MELLITUS:  Pitre had a + EJ antibody and C-peptide 0.8 ng/mL with glucose 144 in Nov 2019.  He started taking insulin in Nov 2019 and he is doing much better and has gained weight.  Blood sugar values are higher postprandial.  Reminded him to take Novolog for all food intake and to take bolus insulin before eating.  No change in insulin doses today.  He is to continue to monitor his blood sugar using his Freestyle Libre2 sensor.  Pt's urine microalbuminuria was negative in Sept 2021.  Piter was seen by Oph here in Nov 2022 without retinopathy.   He reports less numbness in both feet.  Pt's LDL was 102 in 9/2022. He has worked on reducing fat in his diet.  No vitals today. Recent BP was 111/74.    2. FOLLOW UP: with me in clinic in 4 months.  A1C ordered.    Time spent reviewing chart, labs and Freestyle Libre2 sensor download data  today = 6 minutes.  Time for video visit today =18  minutes.  Time for documentation today = 15 minutes.    TOTAL TIME FOR VISIT TODAY = 39  minutes.    Sheila Brewre PA-C    Answers for HPI/ROS submitted by the patient on 3/10/2023  General Symptoms: No  Skin  Symptoms: No  HENT Symptoms: No  EYE SYMPTOMS: No  HEART SYMPTOMS: No  LUNG SYMPTOMS: No  INTESTINAL SYMPTOMS: No  URINARY SYMPTOMS: No  REPRODUCTIVE SYMPTOMS: No  SKELETAL SYMPTOMS: No  BLOOD SYMPTOMS: No  NERVOUS SYSTEM SYMPTOMS: No  MENTAL HEALTH SYMPTOMS: No

## 2023-03-13 NOTE — TELEPHONE ENCOUNTER
Prior Authorization Not Needed per Insurance    Medication: Freestyle Claudia 2 Sensor - PA Not Needed  Insurance Company: [employer] - Phone [phone] Fax [phone]  Expected CoPay: $0    Pharmacy Filling the Rx: [facility] [place], [place] - [address]  Pharmacy Notified:  Y  Patient Notified:            Thank you,     Heladio Diallo, [facility]  Pharmacy Clinic [facility]  [email or web address]   Phone: [phone]  Fax: [phone]

## 2023-03-20 ENCOUNTER — TELEPHONE (OUTPATIENT)
Dept: ENDOCRINOLOGY | Facility: CLINIC | Age: 48
End: 2023-03-20
Payer: COMMERCIAL

## 2023-03-20 NOTE — TELEPHONE ENCOUNTER
DIRKM to schedule 4 month follow up with Sheila Brewer - in person requested per provider    Alana Nava MA

## 2023-03-23 ENCOUNTER — OFFICE VISIT (OUTPATIENT)
Dept: ENDOCRINOLOGY | Facility: CLINIC | Age: 48
End: 2023-03-23
Payer: COMMERCIAL

## 2023-03-23 DIAGNOSIS — E10.42 DM TYPE 1 WITH DIABETIC PERIPHERAL NEUROPATHY (H): ICD-10-CM

## 2023-03-23 DIAGNOSIS — E10.628 TYPE 1 DIABETES MELLITUS WITH PRESSURE CALLUS (H): Primary | ICD-10-CM

## 2023-03-23 DIAGNOSIS — B35.3 TINEA PEDIS OF BOTH FEET: ICD-10-CM

## 2023-03-23 DIAGNOSIS — M20.5X1 HALLUX LIMITUS OF RIGHT FOOT: ICD-10-CM

## 2023-03-23 DIAGNOSIS — M20.5X2 HALLUX LIMITUS OF LEFT FOOT: ICD-10-CM

## 2023-03-23 DIAGNOSIS — L84 TYPE 1 DIABETES MELLITUS WITH PRESSURE CALLUS (H): Primary | ICD-10-CM

## 2023-03-23 PROCEDURE — 11056 PARNG/CUTG B9 HYPRKR LES 2-4: CPT | Performed by: PODIATRIST

## 2023-03-23 PROCEDURE — 99214 OFFICE O/P EST MOD 30 MIN: CPT | Mod: 25 | Performed by: PODIATRIST

## 2023-03-23 NOTE — PROGRESS NOTES
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)     Adjustment disorder with mixed anxiety and depressed mood     Upper back pain     Cervicalgia     Acute pain of right shoulder     Acute low back pain with radicular symptoms, duration less than 6 weeks     Chronic bilateral low back pain with left-sided sciatica     Past Surgical History:   Procedure Laterality Date     ESOPHAGOSCOPY, GASTROSCOPY, DUODENOSCOPY (EGD), COMBINED N/A 12/1/2020    Procedure: ESOPHAGOGASTRODUODENOSCOPY, WITH BIOPSY;  Surgeon: Fabio Mota DO;  Location: [facility] [place] OR     ESOPHAGOSCOPY, GASTROSCOPY, DUODENOSCOPY (EGD), COMBINED N/A 1/3/2023    Procedure: ESOPHAGOGASTRODUODENOSCOPY (EGD);  Surgeon: Sarah Stafford MD;  Location: [facility] [place] OR     NO HISTORY OF SURGERY       Social History     Socioeconomic History     Marital status:      Spouse name: Not on file     Number of children: 3     Years of education: Not on file     Highest education level: Not on file   Occupational History     Occupation: medical transport   Tobacco Use     Smoking status: Never     Smokeless tobacco: Never   Substance and Sexual Activity     Alcohol use: No     Drug use: No     Sexual activity: Yes     Partners: Female     Comment:    Other Topics Concern     Parent/sibling w/ CABG, MI or angioplasty before 65F 55M? Not Asked   Social History Narrative     Not on file     Social Determinants of Health     Financial Resource Strain: Not on file   Food Insecurity: Not on file   Transportation Needs: Not on file   Physical Activity: Not on file   Stress: Not on file   Social Connections: Not on file   Intimate Partner Violence: Not on file   Housing Stability: Not on file     Family History   Problem Relation Age of  Onset     Hypertension Father      Diabetes Father      Heart Disease Father 75     Heart Failure Father      Aortic stenosis Father      Glaucoma No family hx of      Macular Degeneration No family hx of      Lab Results   Component Value Date    A1C 7.9 09/21/2022    A1C 6.7 02/25/2022    A1C 7.0 10/05/2021    A1C 7.3 06/30/2021    A1C 7.2 11/28/2020    A1C 7.6 09/17/2020    A1C 6.9 03/04/2019    A1C 6.3 12/11/2017       Answers for HPI/ROS submitted by the patient on 3/10/2023  General Symptoms: No  Skin Symptoms: No  HENT Symptoms: No  EYE SYMPTOMS: No  HEART SYMPTOMS: No  LUNG SYMPTOMS: No  INTESTINAL SYMPTOMS: No  URINARY SYMPTOMS: No  REPRODUCTIVE SYMPTOMS: No  SKELETAL SYMPTOMS: No  BLOOD SYMPTOMS: No  NERVOUS SYSTEM SYMPTOMS: No  MENTAL HEALTH SYMPTOMS: No        SUBJECTIVE FINDINGS:  A 48-year-old returns to clinic for diabetic foot cares.  He relates that he is waiting for his diabetic shoes.  He is going to get those in April.  Relates he has been using the Loprox cream and that has worked well.  Relates he does sometimes get some pains in his hallux.  He does get some numbness and neuropathy in his big toe and plantar foot at times that comes and goes.  Relates to no ulcers or sores since we have seen him last.  He relates he has some calluses he would like trimmed down.    OBJECTIVE FINDINGS:  DP and PT are 2/4 bilaterally.  No gross tendon voids bilaterally.  No erythema, no drainage, no odor, no calor bilaterally.  He has hyperkeratotic tissue buildup plantar medial hallux bilaterally, plantar 2-4 MPJs bilaterally.  He has functional hallux limitus bilaterally.  He has minimal dry, scaly skin in a moccasin pattern bilaterally.  There are dorsally contracted digits bilaterally, functional hallux limitus bilaterally.    ASSESSMENT AND PLAN:  Diabetes with peripheral neuropathy, diabetes with callus bilaterally.  His tinea pedis is nearly resolved.  He does have functional hallux limitus and hammertoes  present.  Diagnosis and treatment options discussed with him.  Tylomas bilaterally were sharp debrided with a 10 blade upon consent.  I advised him to continue Loprox.  I advised him to get his diabetic shoes and inserts.  He relates he will do this.  Diabetic foot cares discussed with him.  Return to clinic and see me in 3 months.  Previous notes reviewed.              Moderate level of medical decision making.

## 2023-03-23 NOTE — LETTER
3/23/2023       RE: Piter CHENG Said  [address]  [facility] [address]     Dear Colleague,    Thank you for referring your patient, Piter Conway, to the [facility] [place] [facility]. Please see a copy of my visit note below.    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)     Adjustment disorder with mixed anxiety and depressed mood     Upper back pain     Cervicalgia     Acute pain of right shoulder     Acute low back pain with radicular symptoms, duration less than 6 weeks     Chronic bilateral low back pain with left-sided sciatica     Past Surgical History:   Procedure Laterality Date     ESOPHAGOSCOPY, GASTROSCOPY, DUODENOSCOPY (EGD), COMBINED N/A 12/1/2020    Procedure: ESOPHAGOGASTRODUODENOSCOPY, WITH BIOPSY;  Surgeon: Fabio Mota DO;  Location: [facility] [place] OR     ESOPHAGOSCOPY, GASTROSCOPY, DUODENOSCOPY (EGD), COMBINED N/A 1/3/2023    Procedure: ESOPHAGOGASTRODUODENOSCOPY (EGD);  Surgeon: Sarah Stafford MD;  Location: [facility] OR     NO HISTORY OF SURGERY       Social History     Socioeconomic History     Marital status:      Spouse name: Not on file     Number of children: 3     Years of education: Not on file     Highest education level: Not on file   Occupational History     Occupation: medical transport   Tobacco Use     Smoking status: Never     Smokeless tobacco: Never   Substance and Sexual Activity     Alcohol use: No     Drug use: No     Sexual activity: Yes     Partners: Female     Comment:    Other Topics Concern     Parent/sibling w/ CABG, MI or angioplasty before 65F 55M? Not Asked   Social History Narrative     Not on file     Social Determinants of Health      Financial Resource Strain: Not on file   Food Insecurity: Not on file   Transportation Needs: Not on file   Physical Activity: Not on file   Stress: Not on file   Social Connections: Not on file   Intimate Partner Violence: Not on file   Housing Stability: Not on file     Family History   Problem Relation Age of Onset     Hypertension Father      Diabetes Father      Heart Disease Father 75     Heart Failure Father      Aortic stenosis Father      Glaucoma No family hx of      Macular Degeneration No family hx of      Lab Results   Component Value Date    A1C 7.9 09/21/2022    A1C 6.7 02/25/2022    A1C 7.0 10/05/2021    A1C 7.3 06/30/2021    A1C 7.2 11/28/2020    A1C 7.6 09/17/2020    A1C 6.9 03/04/2019    A1C 6.3 12/11/2017       SUBJECTIVE FINDINGS:  A 48-year-old returns to clinic for diabetic foot cares.  He relates that he is waiting for his diabetic shoes.  He is going to get those in April.  Relates he has been using the Loprox cream and that has worked well.  Relates he does sometimes get some pains in his hallux.  He does get some numbness and neuropathy in his big toe and plantar foot at times that comes and goes.  Relates to no ulcers or sores since we have seen him last.  He relates he has some calluses he would like trimmed down.    OBJECTIVE FINDINGS:  DP and PT are 2/4 bilaterally.  No gross tendon voids bilaterally.  No erythema, no drainage, no odor, no calor bilaterally.  He has hyperkeratotic tissue buildup plantar medial hallux bilaterally, plantar 2-4 MPJs bilaterally.  He has functional hallux limitus bilaterally.  He has minimal dry, scaly skin in a moccasin pattern bilaterally.  There are dorsally contracted digits bilaterally, functional hallux limitus bilaterally.    ASSESSMENT AND PLAN:  Diabetes with peripheral neuropathy, diabetes with callus bilaterally.  His tinea pedis is nearly resolved.  He does have functional hallux limitus and hammertoes present.  Diagnosis and treatment  options discussed with him.  Tylomas bilaterally were sharp debrided with a 10 blade upon consent.  I advised him to continue Loprox.  I advised him to get his diabetic shoes and inserts.  He relates he will do this.  Diabetic foot cares discussed with him.  Return to clinic and see me in 3 months.  Previous notes reviewed.    Moderate level of medical decision making.    Again, thank you for allowing me to participate in the care of your patient.      Sincerely,    Yazan Parker DPM

## 2023-03-27 ENCOUNTER — LAB (OUTPATIENT)
Dept: LAB | Facility: CLINIC | Age: 48
End: 2023-03-27
Payer: COMMERCIAL

## 2023-03-27 DIAGNOSIS — E10.65 TYPE 1 DIABETES MELLITUS WITH HYPERGLYCEMIA (H): ICD-10-CM

## 2023-03-27 LAB — HBA1C MFR BLD: 7.4 %

## 2023-03-27 PROCEDURE — 36415 COLL VENOUS BLD VENIPUNCTURE: CPT

## 2023-03-27 PROCEDURE — 83036 HEMOGLOBIN GLYCOSYLATED A1C: CPT

## 2023-04-16 ENCOUNTER — HEALTH MAINTENANCE LETTER (OUTPATIENT)
Age: 48
End: 2023-04-16

## 2023-05-08 ENCOUNTER — APPOINTMENT (OUTPATIENT)
Dept: CT IMAGING | Facility: CLINIC | Age: 48
End: 2023-05-08
Attending: NURSE PRACTITIONER
Payer: COMMERCIAL

## 2023-05-08 ENCOUNTER — HOSPITAL ENCOUNTER (EMERGENCY)
Facility: CLINIC | Age: 48
Discharge: HOME | End: 2023-05-08
Admitting: NURSE PRACTITIONER
Payer: COMMERCIAL

## 2023-05-08 VITALS
RESPIRATION RATE: 18 BRPM | OXYGEN SATURATION: 98 % | HEART RATE: 103 BPM | SYSTOLIC BLOOD PRESSURE: 130 MMHG | TEMPERATURE: 97.4 F | DIASTOLIC BLOOD PRESSURE: 86 MMHG

## 2023-05-08 DIAGNOSIS — M54.42 ACUTE BILATERAL LOW BACK PAIN WITH BILATERAL SCIATICA: ICD-10-CM

## 2023-05-08 DIAGNOSIS — M54.41 ACUTE BILATERAL LOW BACK PAIN WITH BILATERAL SCIATICA: ICD-10-CM

## 2023-05-08 DIAGNOSIS — M51.17 INTERVERTEBRAL DISC DISORDER WITH RADICULOPATHY OF LUMBOSACRAL REGION: ICD-10-CM

## 2023-05-08 LAB
ALBUMIN UR-MCNC: NEGATIVE MG/DL
APPEARANCE UR: CLEAR
BILIRUB UR QL STRIP: NEGATIVE
COLOR UR AUTO: ABNORMAL
GLUCOSE UR STRIP-MCNC: >=1000 MG/DL
HGB UR QL STRIP: NEGATIVE
KETONES UR STRIP-MCNC: ABNORMAL MG/DL
LEUKOCYTE ESTERASE UR QL STRIP: NEGATIVE
MUCOUS THREADS #/AREA URNS LPF: PRESENT /LPF
NITRATE UR QL: NEGATIVE
PH UR STRIP: 6.5 [PH] (ref 5–7)
RBC URINE: 2 /HPF
SP GR UR STRIP: 1.02 (ref 1–1.03)
UROBILINOGEN UR STRIP-MCNC: NORMAL MG/DL
WBC URINE: <1 /HPF

## 2023-05-08 PROCEDURE — 72131 CT LUMBAR SPINE W/O DYE: CPT

## 2023-05-08 PROCEDURE — 99284 EMERGENCY DEPT VISIT MOD MDM: CPT | Performed by: NURSE PRACTITIONER

## 2023-05-08 PROCEDURE — 81001 URINALYSIS AUTO W/SCOPE: CPT | Performed by: NURSE PRACTITIONER

## 2023-05-08 PROCEDURE — 99284 EMERGENCY DEPT VISIT MOD MDM: CPT | Mod: 25

## 2023-05-08 PROCEDURE — 72131 CT LUMBAR SPINE W/O DYE: CPT | Mod: 26 | Performed by: RADIOLOGY

## 2023-05-08 PROCEDURE — 250N000013 HC RX MED GY IP 250 OP 250 PS 637: Performed by: NURSE PRACTITIONER

## 2023-05-08 RX ORDER — ACETAMINOPHEN 325 MG/1
650 TABLET ORAL ONCE
Status: COMPLETED | OUTPATIENT
Start: 2023-05-08 | End: 2023-05-08

## 2023-05-08 RX ORDER — LIDOCAINE 4 G/G
1 PATCH TOPICAL EVERY 24 HOURS
Qty: 15 PATCH | Refills: 0 | Status: SHIPPED | OUTPATIENT
Start: 2023-05-08 | End: 2023-08-01

## 2023-05-08 RX ORDER — HYDROCODONE BITARTRATE AND ACETAMINOPHEN 5; 325 MG/1; MG/1
1 TABLET ORAL EVERY 6 HOURS PRN
Qty: 12 TABLET | Refills: 0 | Status: SHIPPED | OUTPATIENT
Start: 2023-05-08 | End: 2023-05-11

## 2023-05-08 RX ORDER — METHOCARBAMOL 750 MG/1
750 TABLET, FILM COATED ORAL 4 TIMES DAILY PRN
Qty: 28 TABLET | Refills: 0 | Status: SHIPPED | OUTPATIENT
Start: 2023-05-08 | End: 2023-08-01

## 2023-05-08 RX ADMIN — ACETAMINOPHEN 650 MG: 325 TABLET ORAL at 12:50

## 2023-05-08 ASSESSMENT — ACTIVITIES OF DAILY LIVING (ADL)
ADLS_ACUITY_SCORE: 33
ADLS_ACUITY_SCORE: 35
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
## 2023-05-08 NOTE — DISCHARGE INSTRUCTIONS
TODAY'S VISIT:  - You were seen today for back pain    - The CT shows more disc bulging without what is called herniation that does not need surgery at this time.    - If you had any labs or imaging/radiology tests performed today, you should also discuss these tests with your usual provider.     - Emergency Department testing is focused on the potential causes of your symptoms that are the most dangerous possibilities and cannot cover every possibility. Based on the evaluation, it was deemed sufficiently safe to discharge and continue management through the clinics. Thus, follow-up is very important to assess for improvement/worsening, potential further testing, and potential treatment adjustments.     FOLLOW-UP:  Please make an appointment to follow up with:  - Your Primary Care Provider for follow up for you back and glucose in your urine.    - Physical therapy for your back.    - Have your provider review the results from today's visit with you again to make sure no further follow-up or additional testing is needed based on those results.     PRESCRIPTIONS / MEDICATIONS:  - Norco, every 6 hours for pain. This can make you sleep, if you are driving do not take this. You can take Tylenol, 650 mg every 6 hours instead.  - Lidocaine patches to your lower back. Place in the morning and take off in the evening.  - Robaxin, a muscle relaxer up to 4x a day. This can make you sleep, please do not take if you need to drive.  - Continue to take the Celebrex once a day as previously prescribed.    OTHER INSTRUCTIONS:  - Continue to stay as active as you can tolerate.    RETURN TO THE EMERGENCY DEPARTMENT  Return to the Emergency Department at any time for any new or worsening symptoms or any concerns.

## 2023-05-12 ENCOUNTER — OFFICE VISIT (OUTPATIENT)
Dept: FAMILY MEDICINE | Facility: CLINIC | Age: 48
End: 2023-05-12
Payer: COMMERCIAL

## 2023-05-12 VITALS
WEIGHT: 176 LBS | BODY MASS INDEX: 23.87 KG/M2 | SYSTOLIC BLOOD PRESSURE: 108 MMHG | TEMPERATURE: 97.6 F | DIASTOLIC BLOOD PRESSURE: 71 MMHG | OXYGEN SATURATION: 96 % | RESPIRATION RATE: 18 BRPM | HEART RATE: 78 BPM

## 2023-05-12 DIAGNOSIS — M51.26 LUMBAR DISC HERNIATION: ICD-10-CM

## 2023-05-12 DIAGNOSIS — Z09 HOSPITAL DISCHARGE FOLLOW-UP: Primary | ICD-10-CM

## 2023-05-12 DIAGNOSIS — E13.9 LADA (LATENT AUTOIMMUNE DIABETES MELLITUS IN ADULTS) (H): ICD-10-CM

## 2023-05-12 RX ORDER — CELECOXIB 200 MG/1
200 CAPSULE ORAL DAILY
Qty: 90 CAPSULE | Refills: 0 | COMMUNITY
Start: 2023-05-12 | End: 2023-06-07

## 2023-05-12 RX ORDER — CELECOXIB 200 MG/1
CAPSULE ORAL
COMMUNITY
Start: 2022-08-30 | End: 2023-05-12

## 2023-05-12 RX ORDER — PREDNISONE 20 MG/1
TABLET ORAL
Qty: 10 TABLET | Refills: 1 | Status: SHIPPED | OUTPATIENT
Start: 2023-05-12 | End: 2023-08-01

## 2023-05-12 NOTE — NURSING NOTE
48 year old  Chief Complaint   Patient presents with     Hospital F/U     5/8 [facility] ED. Still having pain in back, up and down -- sometimes it's ok, sometimes pain comes back.       Blood pressure 108/71, pulse 78, temperature 97.6  F (36.4  C), temperature source Temporal, resp. rate 18, weight 79.8 kg (176 lb), SpO2 96 %. Body mass index is 23.87 kg/m .  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)     Adjustment disorder with mixed anxiety and depressed mood     Upper back pain     Cervicalgia     Acute pain of right shoulder     Acute low back pain with radicular symptoms, duration less than 6 weeks     Chronic bilateral low back pain with left-sided sciatica       Wt Readings from Last 2 Encounters:   05/12/23 79.8 kg (176 lb)   01/03/23 74.4 kg (164 lb)     BP Readings from Last 3 Encounters:   05/12/23 108/71   05/08/23 130/86   01/03/23 111/74         Current Outpatient Medications   Medication     ACCU-CHEK GUIDE test strip     blood glucose monitoring (ACCU-CHEK FASTCLIX) lancets     celecoxib (CELEBREX) 200 MG capsule     ciclopirox (LOPROX) 0.77 % cream     Continuous Blood Gluc Sensor (FREESTYLE NAVEED 2 SENSOR) MISC     DiphenhydrAMINE HCl (BENADRYL ALLERGY PO)     fluticasone (FLONASE) 50 MCG/ACT nasal spray     hydrOXYzine (ATARAX) 25 MG tablet     insulin aspart (NOVOLOG FLEXPEN) 100 UNIT/ML pen     insulin glargine (LANTUS SOLOSTAR) 100 UNIT/ML pen     insulin pen needle (BD MICH U/F) 32G X 4 MM miscellaneous     Lidocaine (LIDOCARE) 4 % Patch     methocarbamol (ROBAXIN) 750 MG tablet     omeprazole (PRILOSEC) 20 MG DR capsule     polyethylene glycol (MIRALAX) powder     urea (GORMEL) 20 % external cream     Current Facility-Administered Medications   Medication     lidocaine (PF) (XYLOCAINE) 1 % injection 4 mL     triamcinolone (KENALOG-40) injection 40 mg        Social History     Tobacco Use     Smoking status: Never     Smokeless tobacco: Never   Substance Use Topics     Alcohol use: No     Drug use: No       Health Maintenance Due   Topic Date Due     DIABETIC FOOT EXAM  Never done     ADVANCE CARE PLANNING  Never done     Pneumococcal Vaccine: Pediatrics (0 to 5 Years) and At-Risk Patients (6 to 64 Years) (1 - PCV) Never done     COLORECTAL CANCER SCREENING  Never done     YEARLY PREVENTIVE VISIT  02/06/2014     BMP  09/17/2021     COVID-19 Vaccine (4 - Pfizer series) 01/27/2022     DTAP/TDAP/TD IMMUNIZATION (2 - Td or Tdap) 02/06/2023       No results found for: PAP      May 12, 2023 3:16 PM

## 2023-05-12 NOTE — PATIENT INSTRUCTIONS
Check blood sugars  If >200 then increase the long acting insulin by 2 units every 3 days until sugars in range    Then after the finishing the prednisone dose, you can reduce again by 2 units every 3 days until back to normal.    Take prednisone in the morning with food, 2 tablets at the same time, daily x 5 days  Stop taking the celebrex while on the prednisone.    OK to take tylenol up to 3000mg in 24hr.
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
## 2023-05-15 ENCOUNTER — MYC MEDICAL ADVICE (OUTPATIENT)
Dept: FAMILY MEDICINE | Facility: CLINIC | Age: 48
End: 2023-05-15

## 2023-05-17 ENCOUNTER — TELEPHONE (OUTPATIENT)
Dept: MEDSURG UNIT | Facility: CLINIC | Age: 48
End: 2023-05-17
Payer: COMMERCIAL

## 2023-05-18 ENCOUNTER — HOSPITAL ENCOUNTER (OUTPATIENT)
Dept: GENERAL RADIOLOGY | Facility: CLINIC | Age: 48
Discharge: HOME | End: 2023-05-18
Attending: INTERNAL MEDICINE
Payer: COMMERCIAL

## 2023-05-18 ENCOUNTER — HOSPITAL ENCOUNTER (OUTPATIENT)
Facility: CLINIC | Age: 48
Discharge: HOME | End: 2023-05-18
Admitting: PHYSICIAN ASSISTANT
Payer: COMMERCIAL

## 2023-05-18 VITALS — OXYGEN SATURATION: 97 % | HEART RATE: 78 BPM | DIASTOLIC BLOOD PRESSURE: 71 MMHG | SYSTOLIC BLOOD PRESSURE: 117 MMHG

## 2023-05-18 VITALS — OXYGEN SATURATION: 97 % | SYSTOLIC BLOOD PRESSURE: 115 MMHG | DIASTOLIC BLOOD PRESSURE: 76 MMHG | HEART RATE: 79 BPM

## 2023-05-18 DIAGNOSIS — M51.26 LUMBAR DISC HERNIATION: ICD-10-CM

## 2023-05-18 PROCEDURE — 62323 NJX INTERLAMINAR LMBR/SAC: CPT

## 2023-05-18 PROCEDURE — 250N000011 HC RX IP 250 OP 636: Performed by: INTERNAL MEDICINE

## 2023-05-18 PROCEDURE — 999N000154 HC STATISTIC RADIOLOGY XRAY, US, CT, MAR, NM

## 2023-05-18 PROCEDURE — 250N000009 HC RX 250: Performed by: INTERNAL MEDICINE

## 2023-05-18 PROCEDURE — 255N000002 HC RX 255 OP 636: Performed by: INTERNAL MEDICINE

## 2023-05-18 RX ORDER — NICOTINE POLACRILEX 4 MG
15-30 LOZENGE BUCCAL
Status: DISCONTINUED | OUTPATIENT
Start: 2023-05-18 | End: 2023-05-19 | Stop reason: HOSPADM

## 2023-05-18 RX ORDER — BETAMETHASONE SODIUM PHOSPHATE AND BETAMETHASONE ACETATE 3; 3 MG/ML; MG/ML
6 INJECTION, SUSPENSION INTRA-ARTICULAR; INTRALESIONAL; INTRAMUSCULAR; SOFT TISSUE ONCE
Status: COMPLETED | OUTPATIENT
Start: 2023-05-18 | End: 2023-05-18

## 2023-05-18 RX ORDER — LIDOCAINE HYDROCHLORIDE 10 MG/ML
30 INJECTION, SOLUTION EPIDURAL; INFILTRATION; INTRACAUDAL; PERINEURAL ONCE
Status: COMPLETED | OUTPATIENT
Start: 2023-05-18 | End: 2023-05-18

## 2023-05-18 RX ORDER — IOPAMIDOL 408 MG/ML
10 INJECTION, SOLUTION INTRATHECAL ONCE
Status: COMPLETED | OUTPATIENT
Start: 2023-05-18 | End: 2023-05-18

## 2023-05-18 RX ORDER — DEXTROSE MONOHYDRATE 25 G/50ML
25-50 INJECTION, SOLUTION INTRAVENOUS
Status: DISCONTINUED | OUTPATIENT
Start: 2023-05-18 | End: 2023-05-19 | Stop reason: HOSPADM

## 2023-05-18 RX ADMIN — LIDOCAINE HYDROCHLORIDE 30 ML: 10 INJECTION, SOLUTION EPIDURAL; INFILTRATION; INTRACAUDAL; PERINEURAL at 13:29

## 2023-05-18 RX ADMIN — IOPAMIDOL 3 ML: 408 INJECTION, SOLUTION INTRATHECAL at 13:31

## 2023-05-18 RX ADMIN — BETAMETHASONE SODIUM PHOSPHATE AND BETAMETHASONE ACETATE 3 ML: 3; 3 INJECTION, SUSPENSION INTRA-ARTICULAR; INTRALESIONAL; INTRAMUSCULAR at 13:33

## 2023-05-18 ASSESSMENT — ACTIVITIES OF DAILY LIVING (ADL): ADLS_ACUITY_SCORE: 35

## 2023-05-18 NOTE — PROGRESS NOTES
Care Suites Discharge Nursing Note    Patient Information  Name: Piter Conway  Age: 48 year old    Discharge Education:  Discharge instructions reviewed: Yes  Additional education/resources provided: NA  Patient/patient representative verbalizes understanding: Yes  Patient discharging on new medications: No  Medication education completed: N/A    Discharge Plans:   Discharge location: home  Discharge ride contacted: Yes  Approximate discharge time: 1410    Discharge Criteria:  Discharge criteria met and vital signs stable: Yes    Patient Belongs:  Patient belongings returned to patient: Yes    Linda Olivares RN

## 2023-05-18 NOTE — DISCHARGE INSTRUCTIONS
Steroid Injection Discharge Instructions     After you go home:    You may resume your normal diet.    Care of Puncture Site:    If you have a bandaid on your puncture site, you may remove it the next morning  You may shower tomorrow  No bath tubs, whirlpools or swimming pool for at least 48 hours  Use ice packs as needed for discomfort     Activity:    Minimize your activity today. You may gradually resume your normal activity as tolerated  Avoid vigorous or strenuous activity until your symptoms improve or as directed by your doctor  Do NOT drive a vehicle for a few hours after the injection - or longer if you develop numbness in your arm or leg    Medicines:    You may resume all medications, including blood thinners  For minor discomfort, you may take Acetaminophen (Tylenol) or Ibuprofen (Advil)    Pain:     You may experience increased or different pain over the next 24-48 hours  For the next 48 hrs - you may use ice packs for discomfort     Call your primary care doctor if:    You have severe pain that does not improve with pain medication  You have chills or a fever greater than 101 F (38 C)  The site is red, swollen, hot or tender  Increase in pain, weakness or numbness  New problems with your bowel or bladder  Any questions or concerns    What to watch for:    It can be normal to have some bruising or slight swelling at the puncture site.   After the procedure, you may have some new weakness or numbness down your arm/leg from the numbing medicine. This should resolve in a few hours.   You may feel some temporary relief from the numbing medicine, but that will wear off within a few hours.  Your symptoms may return to pre procedure level, or can even be worse for the first 1-2 days.  For many people, the steroid begins to provide some relief within 2-3 days, but it can take up to 2 weeks to obtain the full results.  Some people will get lasting relief from a single injection. Others may require up to 3  injections to get results. If you have more than one steroid injection, they should be given 2 weeks apart.  If you have no improvement in your symptoms after two weeks, please contact the doctor who ordered this procedure to discuss the next steps.  Side effects of your steroid injection are mild and will go away in 2-3 days  Insomnia  Irritability  Flushed face  Water retention  Restlessness  Difficulty sleeping  Increased appetite  Increased blood sugar  If you are diabetic, monitor your blood sugar closely. Contact the provider who manages your diabetes to help you control your blood sugar if needed.    If you have questions or concerns call:                  [facility] Radiology Dept @ [phone]                                    between 8am-4:30pm Mon-Fri    If you have urgent questions outside of these normal business hours, please contact the [facility] on call doctor @ [phone]      The provider who performed your procedure was _________________.

## 2023-06-01 ENCOUNTER — THERAPY VISIT (OUTPATIENT)
Dept: PHYSICAL THERAPY | Facility: CLINIC | Age: 48
End: 2023-06-01
Attending: INTERNAL MEDICINE
Payer: COMMERCIAL

## 2023-06-01 DIAGNOSIS — M51.26 LUMBAR DISC HERNIATION: ICD-10-CM

## 2023-06-01 DIAGNOSIS — M54.50 BILATERAL LOW BACK PAIN WITHOUT SCIATICA: ICD-10-CM

## 2023-06-01 PROCEDURE — 97110 THERAPEUTIC EXERCISES: CPT | Mod: GP

## 2023-06-01 PROCEDURE — 97161 PT EVAL LOW COMPLEX 20 MIN: CPT | Mod: GP

## 2023-06-01 PROCEDURE — 97112 NEUROMUSCULAR REEDUCATION: CPT | Mod: GP
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
## 2023-06-07 ENCOUNTER — OFFICE VISIT (OUTPATIENT)
Dept: FAMILY MEDICINE | Facility: CLINIC | Age: 48
End: 2023-06-07
Payer: COMMERCIAL

## 2023-06-07 VITALS
OXYGEN SATURATION: 95 % | WEIGHT: 172 LBS | SYSTOLIC BLOOD PRESSURE: 116 MMHG | HEART RATE: 86 BPM | RESPIRATION RATE: 15 BRPM | TEMPERATURE: 97.6 F | DIASTOLIC BLOOD PRESSURE: 77 MMHG | HEIGHT: 73 IN | BODY MASS INDEX: 22.8 KG/M2

## 2023-06-07 DIAGNOSIS — R29.898 NECK TIGHTNESS: ICD-10-CM

## 2023-06-07 DIAGNOSIS — M77.11 LATERAL EPICONDYLITIS OF BOTH ELBOWS: ICD-10-CM

## 2023-06-07 DIAGNOSIS — M77.12 LATERAL EPICONDYLITIS OF BOTH ELBOWS: ICD-10-CM

## 2023-06-07 DIAGNOSIS — M54.9 UPPER BACK PAIN: ICD-10-CM

## 2023-06-07 DIAGNOSIS — G44.209 TENSION HEADACHE: ICD-10-CM

## 2023-06-07 DIAGNOSIS — M51.26 LUMBAR DISC HERNIATION: Primary | ICD-10-CM

## 2023-06-07 DIAGNOSIS — Z13.21 ENCOUNTER FOR VITAMIN DEFICIENCY SCREENING: ICD-10-CM

## 2023-06-07 PROCEDURE — 82306 VITAMIN D 25 HYDROXY: CPT | Performed by: INTERNAL MEDICINE

## 2023-06-07 RX ORDER — CELECOXIB 200 MG/1
200 CAPSULE ORAL DAILY
Qty: 90 CAPSULE | Refills: 1 | Status: SHIPPED | OUTPATIENT
Start: 2023-06-07 | End: 2024-02-02

## 2023-06-07 NOTE — PROGRESS NOTES
"Piter CHENG Said is a 48 year old male here for the following issues:    Lumbar disc herniation  Piter has hx of lumbar disc disease.  He had been vacuuming, and felt twinge of pain, then \"bent the wrong way\" and experienced escalation of pain. He presented to ER for evaluation of pain the following day, 5/8/2023.  He had + straight leg raise on exam. No red flags. CT of the lumbar spine showed minor disc protrusion at L4/L5, L5-S1, and S1/S2. No spinal stenosis or foraminal narrowing.  He was given Celebrex 200mg daily and a few tablets of [place] in the ER. They recommended lidocaine patch, tylenol and muscle relaxer.     I saw him post ER visit, on 5/12/2023. At that time, pain was rated 7-9/10 with low back pain radiating to the right hip and leg.  I treated him with a steroid burst 40mg daily x 5 days. He later contacted me for persistent pain and underwent epidural injection of his lumbar spine on 5/18/23.     Today, Piter reports that his back pain is \"getting better day by day.\" He states that the steroid injection was helpful. He had increased pain for the first couple of days afterwards, but then his pain improved. His current pain is located in the left lower back. He has one additional visit with PT and will continue a HEP.  Using heat and doing stretches. Stiffness is worse in the morning. Pain no longer radiating to legs. He is taking Celebrex 200mg daily and asking for medication refill.     SAMANTA (latent autoimmune diabetes mellitus in adults) (H)  Piter's blood sugar is typically in the 140s-150s. He reports that it was higher while on prednisone. He increased his long-acting insulin by a couple of units. He notes that his blood sugar was also higher for about 6 days after his steroid injection. He saw 2 extremely high levels at 350 and 400, but otherwise around 210-230. His blood sugars are now back in the mid 100s. He does report numbness in his feet.     Neck Pain  Piter reports muscle pain in his neck and " upper shoulders. He does report frequent headaches. He notes a past injury to the left side of his neck. He followed with PT for this and this pain resolved.     Joint Pain  Piter reports bilateral elbow, knee, and hip pain since his lumbar steroid injection on 5/18/2023. He has been doing push-ups. His knee pain is worsened when moving from a sitting to a standing position. His right knee once locked and was painful. He has been walking a lot for exercise and has been able to continue this despite his pain. He has also been doing a lot of things around the home, including cleaning, vacuuming, gardening and lifting groceries. Denies ankle pain.    Vitamin D Concern  Piter is interested in getting his Vitamin D level checked. He does not take a Vitamin D supplement. He drinks 2 glasses of 1% cow's milk per day.       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)     Adjustment disorder with mixed anxiety and depressed mood     Upper back pain     Cervicalgia     Acute pain of right shoulder     Acute low back pain with radicular symptoms, duration less than 6 weeks     Chronic bilateral low back pain with left-sided sciatica     Bilateral low back pain without sciatica       Current Outpatient Medications   Medication Sig Dispense Refill     ACCU-CHEK GUIDE test strip TEST BLOOD SUGAR TWO TIMES A DAY OR AS DIRECTED 200 strip 2     blood glucose monitoring (ACCU-CHEK FASTCLIX) lancets TEST TWO TIMES A  each 3     celecoxib (CELEBREX) 200 MG capsule Take 1 capsule (200 mg) by mouth daily 90 capsule 0     ciclopirox (LOPROX) 0.77 % cream Apply topically 2 times daily To feet. 90 g 5     Continuous Blood Gluc Sensor (FREESTYLE NAVEED 2 SENSOR) MISC Change every 14 days. 6 each 11     DiphenhydrAMINE HCl (BENADRYL ALLERGY PO) Take 1 tablet by mouth daily       fluticasone (FLONASE) 50  "MCG/ACT nasal spray Spray 2 sprays into both nostrils daily 16 g 3     insulin aspart (NOVOLOG FLEXPEN) 100 UNIT/ML pen INJECT 1 UNIT UNDER THE SKIN PER 15 GRAMS OF CARB PLUS 1 UNIT PER 50 ABOVE 150 (MAX DOSE 50 UNITS) 45 mL 3     insulin glargine (LANTUS SOLOSTAR) 100 UNIT/ML pen INJECT 15 UNITS SUBCUTANEOUS EVERY MORNING 15 mL 4     insulin pen needle (BD MICH U/F) 32G X 4 MM miscellaneous USE 4 PEN NEEDLES DAILY OR AS DIRECTED 400 each 3     Lidocaine (LIDOCARE) 4 % Patch Place 1 patch onto the skin every 24 hours To prevent lidocaine toxicity, please place the patches on in the AM and remove at night, to keep free for 12 hours. 15 patch 0     omeprazole (PRILOSEC) 20 MG DR capsule Take 20 mg po twice daily 180 capsule 3     polyethylene glycol (MIRALAX) powder Take 17 g (1 capful) by mouth 2 times daily as needed for constipation 510 g 1     urea (GORMEL) 20 % external cream Apply topically daily To feet. 120 g 5     hydrOXYzine (ATARAX) 25 MG tablet Take one po TID (Patient not taking: Reported on 6/7/2023) 90 tablet 1     methocarbamol (ROBAXIN) 750 MG tablet Take 1 tablet (750 mg) by mouth 4 times daily as needed for muscle spasms (Patient not taking: Reported on 6/7/2023) 28 tablet 0     predniSONE (DELTASONE) 20 MG tablet Take 2 daily x 5 days (Patient not taking: Reported on 6/7/2023) 10 tablet 1       No Known Allergies     EXAM  /77 (BP Location: Right arm, Patient Position: Sitting, Cuff Size: Adult Regular)   Pulse 86   Temp 97.6  F (36.4  C) (Skin)   Resp 15   Ht 1.854 m (6' 1\")   Wt 78 kg (172 lb)   SpO2 95%   BMI 22.69 kg/m    Gen: Alert, pleasant, NAD  Neck: point tenderness over upper trapezius and rhomboid m groups bilaterally  Back: Pain with palpation over the SI joints bilaterally, left > right.   Elbows: point tenderness over lateral epicondyles and over muscles of the forearms  Knees: Tender to palpation over bony landmarks, both knees,  No redness , warmth or swelling  Neuro: " mild discomfort over left lumbar spine with straight leg raise      Assessment:  (M51.26) Lumbar disc herniation  (primary encounter diagnosis)  Comment: marked improvement over the past month, low grade pain, he is doing a HEP and wishes to have Rx for Celebrex  Plan: celecoxib (CELEBREX) 200 MG capsule        Medication refilled. Continue with PT stretches.     (Z13.21) Encounter for vitamin deficiency screening  Comment: Per patient request.   Plan: Vitamin D Deficiency  Addendum: level is 21. Recommend 5061-8386 international unit(s) daily x 1 month then drop down to 1000 international unit(s) daily thereafter     (R29.898) Neck tightness  (M54.9) Upper back pain  (G44.209) Tension headache  Comment: tension of muscles of neck and upper back  Plan: Physical Therapy Referral, celecoxib (CELEBREX)        200 MG capsule        Referred to PT for evaluation and treatment.     (M77.11,  M77.12) Lateral epicondylitis of both elbows  Comment: bilateral involvement  Plan: celecoxib (CELEBREX) 200 MG capsule, TENNIS         ELBOW SUPT W/FLOAM L        Provided tennis elbow strap (Band-It). Recommend stretches and cool packs over bony surfaces, contact me if not improving.       I have reviewed, edited and approved the above scribed note.    Rosamaria Banerjee MD  Internal Medicine/Pediatrics      I, Mar Sloan, am serving as a scribe to document services personally performed by Dr. Rosamaria Banerjee, based on data collection and the provider's statements to me.

## 2023-06-07 NOTE — NURSING NOTE
"48 year old  Chief Complaint   Patient presents with     RECHECK     Follow up on lower back pain. The pain has improved; however, some pain continues to stay present. Pt also reports muscle and join pain.        Blood pressure 116/77, pulse 86, temperature 97.6  F (36.4  C), temperature source Skin, resp. rate 15, height 1.854 m (6' 1\"), weight 78 kg (172 lb), SpO2 95 %. Body mass index is 22.69 kg/m .  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)     Adjustment disorder with mixed anxiety and depressed mood     Upper back pain     Cervicalgia     Acute pain of right shoulder     Acute low back pain with radicular symptoms, duration less than 6 weeks     Chronic bilateral low back pain with left-sided sciatica     Bilateral low back pain without sciatica       Wt Readings from Last 2 Encounters:   06/07/23 78 kg (172 lb)   05/12/23 79.8 kg (176 lb)     BP Readings from Last 3 Encounters:   06/07/23 116/77   05/18/23 117/71   05/18/23 115/76         Current Outpatient Medications   Medication     ACCU-CHEK GUIDE test strip     blood glucose monitoring (ACCU-CHEK FASTCLIX) lancets     celecoxib (CELEBREX) 200 MG capsule     ciclopirox (LOPROX) 0.77 % cream     Continuous Blood Gluc Sensor (FREESTYLE NAVEED 2 SENSOR) MISC     DiphenhydrAMINE HCl (BENADRYL ALLERGY PO)     fluticasone (FLONASE) 50 MCG/ACT nasal spray     insulin aspart (NOVOLOG FLEXPEN) 100 UNIT/ML pen     insulin glargine (LANTUS SOLOSTAR) 100 UNIT/ML pen     insulin pen needle (BD MICH U/F) 32G X 4 MM miscellaneous     Lidocaine (LIDOCARE) 4 % Patch     omeprazole (PRILOSEC) 20 MG DR capsule     polyethylene glycol (MIRALAX) powder     urea (GORMEL) 20 % external cream     hydrOXYzine (ATARAX) 25 MG tablet     methocarbamol (ROBAXIN) 750 MG tablet     predniSONE (DELTASONE) 20 MG tablet     Current Facility-Administered " Medications   Medication     lidocaine (PF) (XYLOCAINE) 1 % injection 4 mL     triamcinolone (KENALOG-40) injection 40 mg       Social History     Tobacco Use     Smoking status: Never     Smokeless tobacco: Never   Vaping Use     Vaping status: Never Used   Substance Use Topics     Alcohol use: No     Drug use: No       Health Maintenance Due   Topic Date Due     DIABETIC FOOT EXAM  Never done     ADVANCE CARE PLANNING  Never done     Pneumococcal Vaccine: Pediatrics (0 to 5 Years) and At-Risk Patients (6 to 64 Years) (1 - PCV) Never done     COLORECTAL CANCER SCREENING  Never done     YEARLY PREVENTIVE VISIT  02/06/2014     BMP  09/17/2021     COVID-19 Vaccine (4 - Pfizer series) 01/27/2022     DTAP/TDAP/TD IMMUNIZATION (2 - Td or Tdap) 02/06/2023       No results found for: PAP      June 7, 2023 10:30 AM

## 2023-06-08 LAB — DEPRECATED CALCIDIOL+CALCIFEROL SERPL-MC: 21 UG/L (ref 20–75)

## 2023-06-11 PROBLEM — M77.11 LATERAL EPICONDYLITIS OF BOTH ELBOWS: Status: ACTIVE | Noted: 2023-06-11

## 2023-06-11 PROBLEM — M77.12 LATERAL EPICONDYLITIS OF BOTH ELBOWS: Status: ACTIVE | Noted: 2023-06-11

## 2023-06-11 PROBLEM — M51.26 LUMBAR DISC HERNIATION: Status: ACTIVE | Noted: 2023-06-11

## 2023-06-11 PROBLEM — G44.209 TENSION HEADACHE: Status: ACTIVE | Noted: 2023-06-11

## 2023-06-29 ENCOUNTER — THERAPY VISIT (OUTPATIENT)
Dept: PHYSICAL THERAPY | Facility: CLINIC | Age: 48
End: 2023-06-29
Attending: INTERNAL MEDICINE
Payer: COMMERCIAL

## 2023-06-29 DIAGNOSIS — M54.50 CHRONIC BILATERAL LOW BACK PAIN WITHOUT SCIATICA: Primary | ICD-10-CM

## 2023-06-29 DIAGNOSIS — G89.29 CHRONIC BILATERAL LOW BACK PAIN WITHOUT SCIATICA: Primary | ICD-10-CM

## 2023-06-29 PROCEDURE — 97530 THERAPEUTIC ACTIVITIES: CPT | Mod: GP

## 2023-06-29 PROCEDURE — 97110 THERAPEUTIC EXERCISES: CPT | Mod: GP

## 2023-07-12 ASSESSMENT — ENCOUNTER SYMPTOMS
BACK PAIN: 1
ARTHRALGIAS: 1

## 2023-07-13 ENCOUNTER — OFFICE VISIT (OUTPATIENT)
Dept: ENDOCRINOLOGY | Facility: CLINIC | Age: 48
End: 2023-07-13
Payer: COMMERCIAL

## 2023-07-13 DIAGNOSIS — E10.42 DM TYPE 1 WITH DIABETIC PERIPHERAL NEUROPATHY (H): Primary | ICD-10-CM

## 2023-07-13 DIAGNOSIS — G62.9 PERIPHERAL NEURITIS: ICD-10-CM

## 2023-07-13 PROCEDURE — 99214 OFFICE O/P EST MOD 30 MIN: CPT | Performed by: PODIATRIST

## 2023-07-13 RX ORDER — AMMONIUM LACTATE 12 G/100G
CREAM TOPICAL DAILY
Qty: 385 G | Refills: 5 | Status: SHIPPED | OUTPATIENT
Start: 2023-07-13

## 2023-07-13 NOTE — PROGRESS NOTES
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)     Adjustment disorder with mixed anxiety and depressed mood     Upper back pain     Cervicalgia     Acute pain of right shoulder     Acute low back pain with radicular symptoms, duration less than 6 weeks     Chronic bilateral low back pain with left-sided sciatica     Bilateral low back pain without sciatica     Lateral epicondylitis of both elbows     Tension headache     Lumbar disc herniation     Past Surgical History:   Procedure Laterality Date     ESOPHAGOSCOPY, GASTROSCOPY, DUODENOSCOPY (EGD), COMBINED N/A 12/1/2020    Procedure: ESOPHAGOGASTRODUODENOSCOPY, WITH BIOPSY;  Surgeon: Fabio Mota DO;  [place] [facility] [place]     ESOPHAGOSCOPY, GASTROSCOPY, DUODENOSCOPY (EGD), COMBINED N/A 1/3/2023    Procedure: ESOPHAGOGASTRODUODENOSCOPY (EGD);  Surgeon: Sarah Stafford MD;  [place] [facility] [place]     NO HISTORY OF SURGERY       Social History     Socioeconomic History     Marital status:      Spouse name: Not on file     Number of children: 3     Years of education: Not on file     Highest education level: Not on file   Occupational History     Occupation: medical transport   Tobacco Use     Smoking status: Never     Smokeless tobacco: Never   Vaping Use     Vaping Use: Never used   Substance and Sexual Activity     Alcohol use: No     Drug use: No     Sexual activity: Yes     Partners: Female     Comment:    Other Topics Concern     Parent/sibling w/ CABG, MI or angioplasty before 65F 55M? Not Asked   Social History Narrative     Not on file     Social Determinants of Health     Financial Resource Strain: Not on file   Food Insecurity: Not on file   Transportation Needs: Not on file   Physical Activity: Not on file    Stress: Not on file   Social Connections: Not on file   Intimate Partner Violence: Not on file   Housing Stability: Not on file     Family History   Problem Relation Age of Onset     Hypertension Father      Diabetes Father      Heart Disease Father 75     Heart Failure Father      Aortic stenosis Father      Glaucoma No family hx of      Macular Degeneration No family hx of      Lab Results   Component Value Date    A1C 7.4 03/27/2023    A1C 7.9 09/21/2022    A1C 6.7 02/25/2022    A1C 7.0 10/05/2021    A1C 7.3 06/30/2021    A1C 7.2 11/28/2020    A1C 7.6 09/17/2020    A1C 6.9 03/04/2019    A1C 6.3 12/11/2017       Answers for HPI/ROS submitted by the patient on 7/12/2023  General Symptoms: No  Skin Symptoms: No  HENT Symptoms: No  EYE SYMPTOMS: No  HEART SYMPTOMS: No  LUNG SYMPTOMS: No  INTESTINAL SYMPTOMS: No  URINARY SYMPTOMS: No  REPRODUCTIVE SYMPTOMS: No  SKELETAL SYMPTOMS: Yes  BLOOD SYMPTOMS: No  NERVOUS SYSTEM SYMPTOMS: No  MENTAL HEALTH SYMPTOMS: No  Back pain: Yes  Joint pain: Yes        SUBJECTIVE FINDINGS:  A 48-year-old returns to clinic for Diabetic foot cares.  He relates that he has Diabetic shoes.  Relates he has been using the Loprox cream and that has worked well.  Relates he does sometimes get some numbness in the medial left heel.  Relates to no ulcers or sores since we have seen him last.  Relates to no injuries.     OBJECTIVE FINDINGS:  DP and PT are 2/4 bilaterally.  No gross tendon voids bilaterally.  No erythema, no drainage, no odor, no calor bilaterally.  He has functional hallux limitus bilaterally.  He has no dry, scaly skin bilaterally.  There are dorsally contracted digits bilaterally.  No pain on palpation bilaterally, no gross tendon voids bilaterally.     ASSESSMENT AND PLAN:  Diabetes with peripheral Neuropathy, neuritis left heel, diabetes with callus appears resolved bilaterally.  His Tinea Pedis appears resolved.  He does have functional hallux limitus and hammertoes present.   Diagnosis and treatment options discussed with him.  Discontinue Loprox.  Prescription for AmLactin cream given use discussed with him.    Advised him to check his shoes and watch his sitting positions for the Neuritis.  Diabetic foot cares discussed with him.  Return to clinic and see me in 3 months.  Previous notes reviewed.                    Moderate level of medical decision making.

## 2023-07-13 NOTE — LETTER
7/13/2023       RE: Piter Conway  [address]  [facility] [place] [address]     Dear Colleague,    Thank you for referring your patient, Piter Conway, to the [facility] [place] [facility]. Please see a copy of my visit note below.    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)    Adjustment disorder with mixed anxiety and depressed mood    Upper back pain    Cervicalgia    Acute pain of right shoulder    Acute low back pain with radicular symptoms, duration less than 6 weeks    Chronic bilateral low back pain with left-sided sciatica    Bilateral low back pain without sciatica    Lateral epicondylitis of both elbows    Tension headache    Lumbar disc herniation     Past Surgical History:   Procedure Laterality Date    ESOPHAGOSCOPY, GASTROSCOPY, DUODENOSCOPY (EGD), COMBINED N/A 12/1/2020    Procedure: ESOPHAGOGASTRODUODENOSCOPY, WITH BIOPSY;  Surgeon: Fabio Mota DO;  Location: [facility] [place]    ESOPHAGOSCOPY, GASTROSCOPY, DUODENOSCOPY (EGD), COMBINED N/A 1/3/2023    Procedure: ESOPHAGOGASTRODUODENOSCOPY (EGD);  Surgeon: Sarah Stafford MD;  Location: [facility] [place]    NO HISTORY OF SURGERY       Social History     Socioeconomic History    Marital status:      Spouse name: Not on file    Number of children: 3    Years of education: Not on file    Highest education level: Not on file   Occupational History    Occupation: medical transport   Tobacco Use    Smoking status: Never    Smokeless tobacco: Never   Vaping Use    Vaping Use: Never used   Substance and Sexual Activity    Alcohol use: No    Drug use: No    Sexual activity: Yes     Partners: Female     Comment:    Other Topics Concern     Parent/sibling w/ CABG, MI or angioplasty before 65F 55M? Not Asked   Social History Narrative    Not on file     Social Determinants of Health     Financial Resource Strain: Not on file   Food Insecurity: Not on file   Transportation Needs: Not on file   Physical Activity: Not on file   Stress: Not on file   Social Connections: Not on file   Intimate Partner Violence: Not on file   Housing Stability: Not on file     Family History   Problem Relation Age of Onset    Hypertension Father     Diabetes Father     Heart Disease Father 75    Heart Failure Father     Aortic stenosis Father     Glaucoma No family hx of     Macular Degeneration No family hx of      Lab Results   Component Value Date    A1C 7.4 03/27/2023    A1C 7.9 09/21/2022    A1C 6.7 02/25/2022    A1C 7.0 10/05/2021    A1C 7.3 06/30/2021    A1C 7.2 11/28/2020    A1C 7.6 09/17/2020    A1C 6.9 03/04/2019    A1C 6.3 12/11/2017       Answers for HPI/ROS submitted by the patient on 7/12/2023  General Symptoms: No  Skin Symptoms: No  HENT Symptoms: No  EYE SYMPTOMS: No  HEART SYMPTOMS: No  LUNG SYMPTOMS: No  INTESTINAL SYMPTOMS: No  URINARY SYMPTOMS: No  REPRODUCTIVE SYMPTOMS: No  SKELETAL SYMPTOMS: Yes  BLOOD SYMPTOMS: No  NERVOUS SYSTEM SYMPTOMS: No  MENTAL HEALTH SYMPTOMS: No  Back pain: Yes  Joint pain: Yes        SUBJECTIVE FINDINGS:  A 48-year-old returns to clinic for Diabetic foot cares.  He relates that he has Diabetic shoes.  Relates he has been using the Loprox cream and that has worked well.  Relates he does sometimes get some numbness in the medial left heel.  Relates to no ulcers or sores since we have seen him last.  Relates to no injuries.     OBJECTIVE FINDINGS:  DP and PT are 2/4 bilaterally.  No gross tendon voids bilaterally.  No erythema, no drainage, no odor, no calor bilaterally.  He has functional hallux limitus bilaterally.  He has no dry, scaly skin bilaterally.  There are dorsally contracted digits bilaterally.  No pain on palpation  bilaterally, no gross tendon voids bilaterally.     ASSESSMENT AND PLAN:  Diabetes with peripheral Neuropathy, neuritis left heel, diabetes with callus appears resolved bilaterally.  His Tinea Pedis appears resolved.  He does have functional hallux limitus and hammertoes present.  Diagnosis and treatment options discussed with him.  Discontinue Loprox.  Prescription for AmLactin cream given use discussed with him.    Advised him to check his shoes and watch his sitting positions for the Neuritis.  Diabetic foot cares discussed with him.  Return to clinic and see me in 3 months.  Previous notes reviewed.                    Moderate level of medical decision making.        Again, thank you for allowing me to participate in the care of your patient.      Sincerely,    Yazan Parker DPM

## 2023-07-28 NOTE — PROGRESS NOTES
Answers submitted by the patient for this visit:  Symptoms you have experienced in the last 30 days (Submitted on 8/1/2023)  General Symptoms: No  Skin Symptoms: No  HENT Symptoms: No  EYE SYMPTOMS: No  HEART SYMPTOMS: No  LUNG SYMPTOMS: No  INTESTINAL SYMPTOMS: No  URINARY SYMPTOMS: No  REPRODUCTIVE SYMPTOMS: No  SKELETAL SYMPTOMS: No  BLOOD SYMPTOMS: No  NERVOUS SYSTEM SYMPTOMS: No  MENTAL HEALTH SYMPTOMS: No

## 2023-08-01 ENCOUNTER — OFFICE VISIT (OUTPATIENT)
Dept: ENDOCRINOLOGY | Facility: CLINIC | Age: 48
End: 2023-08-01
Payer: COMMERCIAL

## 2023-08-01 VITALS
HEART RATE: 77 BPM | DIASTOLIC BLOOD PRESSURE: 74 MMHG | BODY MASS INDEX: 22.44 KG/M2 | WEIGHT: 170.1 LBS | SYSTOLIC BLOOD PRESSURE: 113 MMHG

## 2023-08-01 DIAGNOSIS — E10.628 TYPE 1 DIABETES MELLITUS WITH PRESSURE CALLUS (H): Primary | ICD-10-CM

## 2023-08-01 DIAGNOSIS — L84 TYPE 1 DIABETES MELLITUS WITH PRESSURE CALLUS (H): Primary | ICD-10-CM

## 2023-08-01 LAB — HBA1C MFR BLD: 7.4 % (ref 4.3–?)

## 2023-08-01 PROCEDURE — 99215 OFFICE O/P EST HI 40 MIN: CPT | Performed by: PHYSICIAN ASSISTANT

## 2023-08-01 PROCEDURE — 83036 HEMOGLOBIN GLYCOSYLATED A1C: CPT | Performed by: PHYSICIAN ASSISTANT

## 2023-08-01 ASSESSMENT — PAIN SCALES - GENERAL: PAINLEVEL: NO PAIN (0)

## 2023-08-01 NOTE — LETTER
8/1/2023       RE: Piter Conway  [address]  [place] [address]     Dear Colleague,    Thank you for referring your patient, Piter Conway, to the [facility] ENDOCRINOLOGY [facility] [place] at [facility]. Please see a copy of my visit note below.        Answers submitted by the patient for this visit:  Symptoms you have experienced in the last 30 days (Submitted on 8/1/2023)  General Symptoms: No  Skin Symptoms: No  HENT Symptoms: No  EYE SYMPTOMS: No  HEART SYMPTOMS: No  LUNG SYMPTOMS: No  INTESTINAL SYMPTOMS: No  URINARY SYMPTOMS: No  REPRODUCTIVE SYMPTOMS: No  SKELETAL SYMPTOMS: No  BLOOD SYMPTOMS: No  NERVOUS SYSTEM SYMPTOMS: No  MENTAL HEALTH SYMPTOMS: No      HPI  Piter Conway is a 48 year old male with type 1 diabetes mellitus.  Clinic visit today for diabetes follow up.  Pt was started on insulin in Nov 2019.    His EJ was + with C-peptide 0.8 ng/mL.  Piter reports having mild numbness in both feet with has improved. He has no hx of retinopathy or nephropathy.  For his diabetes, he is currently taking Lantus 14 units subcutaneous each am and Novolog 1 unit/15 gms CHO with meals with correction insulin.  Pt's A1C is 7.4 % today.  His A1C was 10.6 % in 2014 time of diabetes diagnosis.  I reviewed and scanned his Freestyle Libre2 sensor download data in his note below.  His average blood sugar is 160 with SD 31 with estimated A1C 7.1 % for the past 2 weeks.  On ROS today, less numbness in feet. Denies foot ulcers.  He has gained weight since using insulin.  Pt denies frequent headaches,blurred vision, n/v, SOB,cough, fever, chills, chest pain,abd pain, diarrhea,dysuria or hematuria.    Diabetes Care  Retinopathy:none; he was seen by Oph in Nov 2022.  Nephropathy:none; pt's urine microalbuminuria negative in 9/2022.  Neuropathy: mild numbness in both feet.  Foot Exam:no ulcers.  Taking aspirin:no  Lipids:  in 9/2022.  CAD: no.  Mental  health: dx of adjustment disorder with mixed anxiety and depressed mood during the COVID pandemic which has improved per patient.  Insulin: Basal and meal time insulin with correction scale.  Testing: Freestyle Libre2 sensor.           ROS  Please see under HPI.    Allergies  No Known Allergies    Medications  Current Outpatient Medications   Medication Sig Dispense Refill    ACCU-CHEK GUIDE test strip TEST BLOOD SUGAR TWO TIMES A DAY OR AS DIRECTED 200 strip 2    ammonium lactate (AMLACTIN) 12 % external cream Apply topically daily To feet. 385 g 5    blood glucose monitoring (ACCU-CHEK FASTCLIX) lancets TEST TWO TIMES A  each 3    celecoxib (CELEBREX) 200 MG capsule Take 1 capsule (200 mg) by mouth daily 90 capsule 1    ciclopirox (LOPROX) 0.77 % cream Apply topically 2 times daily To feet. 90 g 5    Continuous Blood Gluc Sensor (FREESTYLE NAVEED 2 SENSOR) MISC Change every 14 days. 6 each 11    DiphenhydrAMINE HCl (BENADRYL ALLERGY PO) Take 1 tablet by mouth daily      fluticasone (FLONASE) 50 MCG/ACT nasal spray Spray 2 sprays into both nostrils daily 16 g 3    insulin aspart (NOVOLOG FLEXPEN) 100 UNIT/ML pen INJECT 1 UNIT UNDER THE SKIN PER 15 GRAMS OF CARB PLUS 1 UNIT PER 50 ABOVE 150 (MAX DOSE 50 UNITS) 45 mL 3    insulin glargine (LANTUS SOLOSTAR) 100 UNIT/ML pen INJECT 15 UNITS SUBCUTANEOUS EVERY MORNING 15 mL 4    insulin pen needle (BD MICH U/F) 32G X 4 MM miscellaneous USE 4 PEN NEEDLES DAILY OR AS DIRECTED 400 each 3    omeprazole (PRILOSEC) 20 MG DR capsule Take 20 mg po twice daily 180 capsule 3    polyethylene glycol (MIRALAX) powder Take 17 g (1 capful) by mouth 2 times daily as needed for constipation 510 g 1    urea (GORMEL) 20 % external cream Apply topically daily To feet. 120 g 5    hydrOXYzine (ATARAX) 25 MG tablet Take one po TID (Patient not taking: Reported on 6/7/2023) 90 tablet 1    Lidocaine (LIDOCARE) 4 % Patch Place 1 patch onto the skin every 24 hours To prevent lidocaine  toxicity, please place the patches on in the AM and remove at night, to keep free for 12 hours. (Patient not taking: Reported on 8/1/2023) 15 patch 0    methocarbamol (ROBAXIN) 750 MG tablet Take 1 tablet (750 mg) by mouth 4 times daily as needed for muscle spasms (Patient not taking: Reported on 6/7/2023) 28 tablet 0    predniSONE (DELTASONE) 20 MG tablet Take 2 daily x 5 days (Patient not taking: Reported on 6/7/2023) 10 tablet 1       Family History  family history includes Aortic stenosis in his father; Diabetes in his father; Heart Disease (age of onset: 75) in his father; Heart Failure in his father; Hypertension in his father.  He also has a younger brother with type 1 diabetes using an insulin pump.    Social History  Smoke: none.  ETOH: none.    Past Medical History    1. SAMANTA/DM 1 - dx 2014.  Past Medical History:   Diagnosis Date    Diabetes (H) 2014    History of hepatitis A 2001   Adjustment disorder with mixed anxiety and depressed mood.    Physical Exam    /74   Pulse 77   Wt 77.2 kg (170 lb 1.6 oz)   BMI 22.44 kg/m       FEET: no ulcers.    RESULTS  Creatinine   Date Value Ref Range Status   09/21/2022 0.74 0.66 - 1.25 mg/dL Final   11/28/2020 0.89 0.66 - 1.25 mg/dL Final     GFR Estimate   Date Value Ref Range Status   09/21/2022 >90 >60 mL/min/1.73m2 Final     Comment:     Effective December 21, 2021 eGFRcr in adults is calculated using the 2021 CKD-EPI creatinine equation which includes age and gender (Mateo el al., NEJM, DOI: 10.1056/GBONfd5945165)   11/28/2020 >90 >60 mL/min/[1.73_m2] Final     Comment:     Non  GFR Calc  Starting 12/18/2018, serum creatinine based estimated GFR (eGFR) will be   calculated using the Chronic Kidney Disease Epidemiology Collaboration   (CKD-EPI) equation.       Hemoglobin A1C   Date Value Ref Range Status   03/27/2023 7.4 (H) <5.7 % Final     Comment:     Normal <5.7%   Prediabetes 5.7-6.4%    Diabetes 6.5% or higher     Note: Adopted from  ADA consensus guidelines.   06/30/2021 7.3 (H) 0 - 5.6 % Final     Comment:     Normal <5.7% Prediabetes 5.7-6.4%  Diabetes 6.5% or higher - adopted from ADA   consensus guidelines.       Potassium   Date Value Ref Range Status   11/28/2020 4.5 3.4 - 5.3 mmol/L Final     ALT   Date Value Ref Range Status   11/28/2020 49 0 - 70 U/L Final     AST   Date Value Ref Range Status   09/21/2022 11 0 - 45 U/L Final   11/28/2020 11 0 - 45 U/L Final     TSH   Date Value Ref Range Status   09/21/2022 1.39 0.40 - 4.00 mU/L Final   06/30/2021 1.19 0.40 - 4.00 mU/L Final     T4 Free   Date Value Ref Range Status   12/11/2017 1.12 0.76 - 1.46 ng/dL Final       Cholesterol   Date Value Ref Range Status   09/21/2022 157 <200 mg/dL Final   10/05/2021 165 <200 mg/dL Final   11/28/2020 159 <200 mg/dL Final   09/17/2020 154.0 0.0 - 200.0 Final     HDL Cholesterol   Date Value Ref Range Status   11/28/2020 43 >39 mg/dL Final   09/17/2020 46.0 >40.0 Final     Direct Measure HDL   Date Value Ref Range Status   09/21/2022 43 >=40 mg/dL Final   10/05/2021 45 >=40 mg/dL Final     LDL Cholesterol Calculated   Date Value Ref Range Status   09/21/2022 102 (H) <=100 mg/dL Final   10/05/2021 107 (H) <=100 mg/dL Final   11/28/2020 103 (H) <100 mg/dL Final     Comment:     Above desirable:  100-129 mg/dl  Borderline High:  130-159 mg/dL  High:             160-189 mg/dL  Very high:       >189 mg/dl     02/27/2020 82 <100 mg/dL Final     Comment:     Desirable:       <100 mg/dl     LDL Cholesterol Direct   Date Value Ref Range Status   09/17/2020 86.0 0.0 - 129.0 Final     Triglycerides   Date Value Ref Range Status   09/21/2022 61 <150 mg/dL Final   10/05/2021 66 <150 mg/dL Final   11/28/2020 68 <150 mg/dL Final   09/17/2020 107.0 0.0 - 150.0 Final     Cholesterol/HDL Ratio   Date Value Ref Range Status   09/17/2020 3.3 0.0 - 5.0 Final   08/27/2015 3.0 0.0 - 5.0 Final     ASSESSMENT/PLAN:    1.  TYPE 1 DIABETES MELLITUS:  Piter had a + EJ antibody and  C-peptide 0.8 ng/mL with glucose 144 in Nov 2019.  He started taking insulin in Nov 2019 and he is doing much better and has gained weight.  Reminded him to take Novolog for all food intake and to take bolus insulin before eating.  No change in insulin doses today.  He is to continue to monitor his blood sugar using his Freestyle Libre2 sensor.  Pt's urine microalbuminuria was negative in Sept 2022.  Piter was seen by Oph here in Nov 2022 without retinopathy.   He reports less numbness in both feet. Seen by Podiatry.  Pt's LDL was 102 in 9/2022. He has worked on reducing fat in his diet. Declines statin.  /74 today.    2. FOLLOW UP: with me in clinic in Nov 2023.  A1C and annual fasting diabetes labs ordered and to be done prior to next visit.    Time spent reviewing chart, labs and Freestyle Libre2 sensor download data  today = 6 minutes.  Time for clinic  visit today = 25  minutes.  Time for documentation today = 15 minutes.    TOTAL TIME FOR VISIT TODAY =  46 minutes.    Sheila Brewer PA-C    Answers submitted by the patient for this visit:  Symptoms you have experienced in the last 30 days (Submitted on 8/1/2023)  General Symptoms: No  Skin Symptoms: No  HENT Symptoms: No  EYE SYMPTOMS: No  HEART SYMPTOMS: No  LUNG SYMPTOMS: No  INTESTINAL SYMPTOMS: No  URINARY SYMPTOMS: No  REPRODUCTIVE SYMPTOMS: No  SKELETAL SYMPTOMS: No  BLOOD SYMPTOMS: No  NERVOUS SYSTEM SYMPTOMS: No  MENTAL HEALTH SYMPTOMS: No

## 2023-08-01 NOTE — PROGRESS NOTES
HPI  Piter Conway is a 48 year old male with type 1 diabetes mellitus.  Clinic visit today for diabetes follow up.  Pt was started on insulin in Nov 2019.    His EJ was + with C-peptide 0.8 ng/mL.  Piter reports having mild numbness in both feet with has improved. He has no hx of retinopathy or nephropathy.  For his diabetes, he is currently taking Lantus 14 units subcutaneous each am and Novolog 1 unit/15 gms CHO with meals with correction insulin.  Pt's A1C is 7.4 % today.  His A1C was 10.6 % in 2014 time of diabetes diagnosis.  I reviewed and scanned his Freestyle Libre2 sensor download data in his note below.  His average blood sugar is 160 with SD 31 with estimated A1C 7.1 % for the past 2 weeks.  On ROS today, less numbness in feet. Denies foot ulcers.  He has gained weight since using insulin.  Pt denies frequent headaches,blurred vision, n/v, SOB,cough, fever, chills, chest pain,abd pain, diarrhea,dysuria or hematuria.    Diabetes Care  Retinopathy:none; he was seen by Oph in Nov 2022.  Nephropathy:none; pt's urine microalbuminuria negative in 9/2022.  Neuropathy: mild numbness in both feet.  Foot Exam:no ulcers.  Taking aspirin:no  Lipids:  in 9/2022.  CAD: no.  Mental health: dx of adjustment disorder with mixed anxiety and depressed mood during the COVID pandemic which has improved per patient.  Insulin: Basal and meal time insulin with correction scale.  Testing: Freestyle Libre2 sensor.           ROS  Please see under HPI.    Allergies  No Known Allergies    Medications  Current Outpatient Medications   Medication Sig Dispense Refill    ACCU-CHEK GUIDE test strip TEST BLOOD SUGAR TWO TIMES A DAY OR AS DIRECTED 200 strip 2    ammonium lactate (AMLACTIN) 12 % external cream Apply topically daily To feet. 385 g 5    blood glucose monitoring (ACCU-CHEK FASTCLIX) lancets TEST TWO TIMES A  each 3    celecoxib (CELEBREX) 200 MG capsule Take 1 capsule (200 mg) by mouth daily 90 capsule 1     ciclopirox (LOPROX) 0.77 % cream Apply topically 2 times daily To feet. 90 g 5    Continuous Blood Gluc Sensor (FREESTYLE NAVEED 2 SENSOR) MISC Change every 14 days. 6 each 11    DiphenhydrAMINE HCl (BENADRYL ALLERGY PO) Take 1 tablet by mouth daily      fluticasone (FLONASE) 50 MCG/ACT nasal spray Spray 2 sprays into both nostrils daily 16 g 3    insulin aspart (NOVOLOG FLEXPEN) 100 UNIT/ML pen INJECT 1 UNIT UNDER THE SKIN PER 15 GRAMS OF CARB PLUS 1 UNIT PER 50 ABOVE 150 (MAX DOSE 50 UNITS) 45 mL 3    insulin glargine (LANTUS SOLOSTAR) 100 UNIT/ML pen INJECT 15 UNITS SUBCUTANEOUS EVERY MORNING 15 mL 4    insulin pen needle (BD MICH U/F) 32G X 4 MM miscellaneous USE 4 PEN NEEDLES DAILY OR AS DIRECTED 400 each 3    omeprazole (PRILOSEC) 20 MG DR capsule Take 20 mg po twice daily 180 capsule 3    polyethylene glycol (MIRALAX) powder Take 17 g (1 capful) by mouth 2 times daily as needed for constipation 510 g 1    urea (GORMEL) 20 % external cream Apply topically daily To feet. 120 g 5    hydrOXYzine (ATARAX) 25 MG tablet Take one po TID (Patient not taking: Reported on 6/7/2023) 90 tablet 1    Lidocaine (LIDOCARE) 4 % Patch Place 1 patch onto the skin every 24 hours To prevent lidocaine toxicity, please place the patches on in the AM and remove at night, to keep free for 12 hours. (Patient not taking: Reported on 8/1/2023) 15 patch 0    methocarbamol (ROBAXIN) 750 MG tablet Take 1 tablet (750 mg) by mouth 4 times daily as needed for muscle spasms (Patient not taking: Reported on 6/7/2023) 28 tablet 0    predniSONE (DELTASONE) 20 MG tablet Take 2 daily x 5 days (Patient not taking: Reported on 6/7/2023) 10 tablet 1       Family History  family history includes Aortic stenosis in his father; Diabetes in his father; Heart Disease (age of onset: 75) in his father; Heart Failure in his father; Hypertension in his father.  He also has a younger brother with type 1 diabetes using an insulin pump.    Social History  Smoke:  none.  ETOH: none.    Past Medical History    1. SAMANTA/DM 1 - dx 2014.  Past Medical History:   Diagnosis Date    Diabetes (H) 2014    History of hepatitis A 2001   Adjustment disorder with mixed anxiety and depressed mood.    Physical Exam    /74   Pulse 77   Wt 77.2 kg (170 lb 1.6 oz)   BMI 22.44 kg/m       FEET: no ulcers.    RESULTS  Creatinine   Date Value Ref Range Status   09/21/2022 0.74 0.66 - 1.25 mg/dL Final   11/28/2020 0.89 0.66 - 1.25 mg/dL Final     GFR Estimate   Date Value Ref Range Status   09/21/2022 >90 >60 mL/min/1.73m2 Final     Comment:     Effective December 21, 2021 eGFRcr in adults is calculated using the 2021 CKD-EPI creatinine equation which includes age and gender (Mateo et al., NEJ, DOI: 10.1056/HUXQno7768188)   11/28/2020 >90 >60 mL/min/[1.73_m2] Final     Comment:     Non  GFR Calc  Starting 12/18/2018, serum creatinine based estimated GFR (eGFR) will be   calculated using the Chronic Kidney Disease Epidemiology Collaboration   (CKD-EPI) equation.       Hemoglobin A1C   Date Value Ref Range Status   03/27/2023 7.4 (H) <5.7 % Final     Comment:     Normal <5.7%   Prediabetes 5.7-6.4%    Diabetes 6.5% or higher     Note: Adopted from ADA consensus guidelines.   06/30/2021 7.3 (H) 0 - 5.6 % Final     Comment:     Normal <5.7% Prediabetes 5.7-6.4%  Diabetes 6.5% or higher - adopted from ADA   consensus guidelines.       Potassium   Date Value Ref Range Status   11/28/2020 4.5 3.4 - 5.3 mmol/L Final     ALT   Date Value Ref Range Status   11/28/2020 49 0 - 70 U/L Final     AST   Date Value Ref Range Status   09/21/2022 11 0 - 45 U/L Final   11/28/2020 11 0 - 45 U/L Final     TSH   Date Value Ref Range Status   09/21/2022 1.39 0.40 - 4.00 mU/L Final   06/30/2021 1.19 0.40 - 4.00 mU/L Final     T4 Free   Date Value Ref Range Status   12/11/2017 1.12 0.76 - 1.46 ng/dL Final       Cholesterol   Date Value Ref Range Status   09/21/2022 157 <200 mg/dL Final   10/05/2021  165 <200 mg/dL Final   11/28/2020 159 <200 mg/dL Final   09/17/2020 154.0 0.0 - 200.0 Final     HDL Cholesterol   Date Value Ref Range Status   11/28/2020 43 >39 mg/dL Final   09/17/2020 46.0 >40.0 Final     Direct Measure HDL   Date Value Ref Range Status   09/21/2022 43 >=40 mg/dL Final   10/05/2021 45 >=40 mg/dL Final     LDL Cholesterol Calculated   Date Value Ref Range Status   09/21/2022 102 (H) <=100 mg/dL Final   10/05/2021 107 (H) <=100 mg/dL Final   11/28/2020 103 (H) <100 mg/dL Final     Comment:     Above desirable:  100-129 mg/dl  Borderline High:  130-159 mg/dL  High:             160-189 mg/dL  Very high:       >189 mg/dl     02/27/2020 82 <100 mg/dL Final     Comment:     Desirable:       <100 mg/dl     LDL Cholesterol Direct   Date Value Ref Range Status   09/17/2020 86.0 0.0 - 129.0 Final     Triglycerides   Date Value Ref Range Status   09/21/2022 61 <150 mg/dL Final   10/05/2021 66 <150 mg/dL Final   11/28/2020 68 <150 mg/dL Final   09/17/2020 107.0 0.0 - 150.0 Final     Cholesterol/HDL Ratio   Date Value Ref Range Status   09/17/2020 3.3 0.0 - 5.0 Final   08/27/2015 3.0 0.0 - 5.0 Final       ASSESSMENT/PLAN:    1.  TYPE 1 DIABETES MELLITUS:  Piter had a + EJ antibody and C-peptide 0.8 ng/mL with glucose 144 in Nov 2019.  He started taking insulin in Nov 2019 and he is doing much better and has gained weight.  Reminded him to take Novolog for all food intake and to take bolus insulin before eating.  No change in insulin doses today.  He is to continue to monitor his blood sugar using his Freestyle Libre2 sensor.  Pt's urine microalbuminuria was negative in Sept 2022.  Piter was seen by Oph here in Nov 2022 without retinopathy.   He reports less numbness in both feet. Seen by Podiatry.  Pt's LDL was 102 in 9/2022. He has worked on reducing fat in his diet. Declines statin.  /74 today.    2. FOLLOW UP: with me in clinic in Nov 2023.  A1C and annual fasting diabetes labs ordered and to be done  prior to next visit.    Time spent reviewing chart, labs and Freestyle Libre2 sensor download data  today = 6 minutes.  Time for clinic  visit today = 25  minutes.  Time for documentation today = 15 minutes.    TOTAL TIME FOR VISIT TODAY =  46 minutes.    Sheila Brewer PA-C    Answers submitted by the patient for this visit:  Symptoms you have experienced in the last 30 days (Submitted on 8/1/2023)  General Symptoms: No  Skin Symptoms: No  HENT Symptoms: No  EYE SYMPTOMS: No  HEART SYMPTOMS: No  LUNG SYMPTOMS: No  INTESTINAL SYMPTOMS: No  URINARY SYMPTOMS: No  REPRODUCTIVE SYMPTOMS: No  SKELETAL SYMPTOMS: No  BLOOD SYMPTOMS: No  NERVOUS SYSTEM SYMPTOMS: No  MENTAL HEALTH SYMPTOMS: No

## 2023-08-09 ENCOUNTER — TELEPHONE (OUTPATIENT)
Dept: ENDOCRINOLOGY | Facility: CLINIC | Age: 48
End: 2023-08-09
Payer: COMMERCIAL

## 2023-08-14 NOTE — TELEPHONE ENCOUNTER
DIRKM and sent mychart x1 for patient to reschedule:    10/26/23 appointment with Dr. Yazan Parker as provider is no longer available that day. Reschedule to next available.   
Spoke with patient and rescheduled 10/26/23 appointment to 12/14/23.  
17-Oct-2019 08:22

## 2023-09-27 ENCOUNTER — LAB (OUTPATIENT)
Dept: LAB | Facility: CLINIC | Age: 48
End: 2023-09-27
Payer: COMMERCIAL

## 2023-09-27 DIAGNOSIS — E10.628 TYPE 1 DIABETES MELLITUS WITH PRESSURE CALLUS (H): ICD-10-CM

## 2023-09-27 DIAGNOSIS — L84 TYPE 1 DIABETES MELLITUS WITH PRESSURE CALLUS (H): ICD-10-CM

## 2023-09-27 LAB
AST SERPL W P-5'-P-CCNC: 17 U/L (ref 0–45)
CHOLEST SERPL-MCNC: 164 MG/DL
CREAT SERPL-MCNC: 0.88 MG/DL (ref 0.67–1.17)
CREAT UR-MCNC: 96.4 MG/DL
EGFRCR SERPLBLD CKD-EPI 2021: >90 ML/MIN/1.73M2
HBA1C MFR BLD: 7.1 %
HDLC SERPL-MCNC: 50 MG/DL
LDLC SERPL CALC-MCNC: 102 MG/DL
MICROALBUMIN UR-MCNC: <12 MG/L
MICROALBUMIN/CREAT UR: NORMAL MG/G{CREAT}
NONHDLC SERPL-MCNC: 114 MG/DL
TRIGL SERPL-MCNC: 59 MG/DL
TSH SERPL DL<=0.005 MIU/L-ACNC: 1.18 UIU/ML (ref 0.3–4.2)

## 2023-09-27 PROCEDURE — 83036 HEMOGLOBIN GLYCOSYLATED A1C: CPT

## 2023-09-27 PROCEDURE — 80061 LIPID PANEL: CPT

## 2023-09-27 PROCEDURE — 84450 TRANSFERASE (AST) (SGOT): CPT

## 2023-09-27 PROCEDURE — 82565 ASSAY OF CREATININE: CPT

## 2023-09-27 PROCEDURE — 84443 ASSAY THYROID STIM HORMONE: CPT

## 2023-09-27 PROCEDURE — 82570 ASSAY OF URINE CREATININE: CPT

## 2023-09-27 PROCEDURE — 36415 COLL VENOUS BLD VENIPUNCTURE: CPT

## 2023-10-05 ENCOUNTER — IMMUNIZATION (OUTPATIENT)
Dept: FAMILY MEDICINE | Facility: CLINIC | Age: 48
End: 2023-10-05
Payer: COMMERCIAL

## 2023-10-05 DIAGNOSIS — Z23 ENCOUNTER FOR IMMUNIZATION: Primary | ICD-10-CM

## 2023-10-05 PROCEDURE — 90686 IIV4 VACC NO PRSV 0.5 ML IM: CPT

## 2023-10-05 PROCEDURE — 99207 PR NO CHARGE NURSE ONLY: CPT

## 2023-10-05 PROCEDURE — 90471 IMMUNIZATION ADMIN: CPT

## 2023-10-05 NOTE — PROGRESS NOTES
Prior to immunization administration, verified patients identity using patient s name and date of birth. Please see Immunization Activity for additional information.     Screening Questionnaire for Adult Immunization    Are you sick today?   No   Do you have allergies to medications, food, a vaccine component or latex?   No   Have you ever had a serious reaction after receiving a vaccination?   No   Do you have a long-term health problem with heart, lung, kidney, or metabolic disease (e.g., diabetes), asthma, a blood disorder, no spleen, complement component deficiency, a cochlear implant, or a spinal fluid leak?  Are you on long-term aspirin therapy?   No   Do you have cancer, leukemia, HIV/AIDS, or any other immune system problem?   No   Do you have a parent, brother, or sister with an immune system problem?   No   In the past 3 months, have you taken medications that affect  your immune system, such as prednisone, other steroids, or anticancer drugs; drugs for the treatment of rheumatoid arthritis, Crohn s disease, or psoriasis; or have you had radiation treatments?   No   Have you had a seizure, or a brain or other nervous system problem?   No   During the past year, have you received a transfusion of blood or blood    products, or been given immune (gamma) globulin or antiviral drug?   No   For women: Are you pregnant or is there a chance you could become       pregnant during the next month?   N/a   Have you received any vaccinations in the past 4 weeks?   No     Immunization questionnaire answers were all negative.    I have reviewed the following standing orders:   This patient is due and qualifies for the Influenza vaccine.    Click here for Influenza Vaccine Standing Order    I have reviewed the vaccines inclusion and exclusion criteria; No concerns regarding eligibility.     Patient instructed to remain in clinic for 15 minutes afterwards, and to report any adverse reactions.     Screening performed by  Nacho Sen MA on 10/5/2023 at 3:09 PM.

## 2023-11-06 NOTE — PROGRESS NOTES
atient is showing 3/5 MNCM met. A1c not in range   Outcome for 11/06/23 2:30 PM: Per patient, will upload device before appointment  María Mosley CMA

## 2023-11-14 ENCOUNTER — OFFICE VISIT (OUTPATIENT)
Dept: ENDOCRINOLOGY | Facility: CLINIC | Age: 48
End: 2023-11-14
Payer: COMMERCIAL

## 2023-11-14 VITALS
DIASTOLIC BLOOD PRESSURE: 84 MMHG | SYSTOLIC BLOOD PRESSURE: 100 MMHG | BODY MASS INDEX: 22.26 KG/M2 | HEART RATE: 73 BPM | WEIGHT: 168 LBS | HEIGHT: 73 IN

## 2023-11-14 DIAGNOSIS — E13.9 LADA (LATENT AUTOIMMUNE DIABETES MELLITUS IN ADULTS) (H): ICD-10-CM

## 2023-11-14 PROCEDURE — 99215 OFFICE O/P EST HI 40 MIN: CPT | Performed by: PHYSICIAN ASSISTANT

## 2023-11-14 RX ORDER — INSULIN GLARGINE 100 [IU]/ML
INJECTION, SOLUTION SUBCUTANEOUS
Qty: 15 ML | Refills: 4 | Status: SHIPPED | OUTPATIENT
Start: 2023-11-14 | End: 2024-06-18

## 2023-11-14 ASSESSMENT — PAIN SCALES - GENERAL: PAINLEVEL: MODERATE PAIN (4)

## 2023-11-14 NOTE — LETTER
11/14/2023       RE: Piter Conway  [address] [place] [id number]     Dear Colleague,    Thank you for referring your patient, Piter Conway, to the [facility] [place] at [facility]. Please see a copy of my visit note below.    Patient is showing 3/5 MNCM met. A1c not in range   Outcome for 11/06/23 2:30 PM: Per patient, will upload device before appointment  BIRGIT Robertson  Piter Conway is a 48 year old male with type 1 diabetes mellitus.  Clinic visit today for diabetes follow up.  Pt was started on insulin in Nov 2019.  His EJ was + with C-peptide 0.8 ng/mL.  Piter reports having mild numbness in both feet with has improved. He has no hx of retinopathy or nephropathy.  For his diabetes, he is currently taking Lantus 14 units subcutaneous each am and Novolog 1 unit/15 gms CHO with meals with correction insulin.  Pt's A1C was 7.1 % on 9/27/2023.  His A1C was 7.4 % in March 2023.  I reviewed and scanned his Freestyle Libre2 sensor download data in his note below.  His average blood sugar is 133 with  an estimated A1C 6.5 % for the past 2 weeks.  His blood sugars are in target 84 % of the time with 3 % low. He has had some low FBS values.  On ROS today, less numbness in feet. Denies foot ulcers.  Seen by Podiatry.  He has gained weight since using insulin.  Pt denies frequent headaches,blurred vision, n/v, SOB,cough, fever, chills, chest pain,abd pain, diarrhea,dysuria or hematuria.    Diabetes Care  Retinopathy:none; he was seen by Oph in Nov 2022. Piter plans to scheduled his annual diabetic eye exam.  Nephropathy:none; pt's urine microalbuminuria negative in 9/2023.  Neuropathy: mild numbness in both feet.  Foot Exam:no ulcers.  Taking aspirin:no  Lipids:  in 9/2022.  CAD: no.  Mental health: dx of adjustment disorder with mixed anxiety and depressed mood during the COVID pandemic which has improved per  patient.  Insulin: Basal and meal time insulin with correction scale.  Testing: Freestyle Libre2 sensor.             ROS  Please see under HPI.    Allergies  No Known Allergies    Medications  Current Outpatient Medications   Medication Sig Dispense Refill    ACCU-CHEK GUIDE test strip TEST BLOOD SUGAR TWO TIMES A DAY OR AS DIRECTED 200 strip 2    ammonium lactate (AMLACTIN) 12 % external cream Apply topically daily To feet. 385 g 5    blood glucose monitoring (ACCU-CHEK FASTCLIX) lancets TEST TWO TIMES A  each 3    celecoxib (CELEBREX) 200 MG capsule Take 1 capsule (200 mg) by mouth daily 90 capsule 1    ciclopirox (LOPROX) 0.77 % cream Apply topically 2 times daily To feet. 90 g 5    Continuous Blood Gluc Sensor (FREESTYLE NAVEED 2 SENSOR) MISC Change every 14 days. 6 each 11    DiphenhydrAMINE HCl (BENADRYL ALLERGY PO) Take 1 tablet by mouth daily      fluticasone (FLONASE) 50 MCG/ACT nasal spray Spray 2 sprays into both nostrils daily 16 g 3    insulin aspart (NOVOLOG FLEXPEN) 100 UNIT/ML pen INJECT 1 UNIT UNDER THE SKIN PER 15 GRAMS OF CARB PLUS 1 UNIT PER 50 ABOVE 150 (MAX DOSE 50 UNITS) 45 mL 3    insulin glargine (LANTUS SOLOSTAR) 100 UNIT/ML pen INJECT 13 UNITS SUBCUTANEOUS EVERY MORNING.PLEASE DO NOT FILL TODAY( 11/14/2023). 15 mL 4    insulin pen needle (BD MICH U/F) 32G X 4 MM miscellaneous USE 4 PEN NEEDLES DAILY OR AS DIRECTED 400 each 3    omeprazole (PRILOSEC) 20 MG DR capsule Take 20 mg po twice daily 180 capsule 3    polyethylene glycol (MIRALAX) powder Take 17 g (1 capful) by mouth 2 times daily as needed for constipation 510 g 1    urea (GORMEL) 20 % external cream Apply topically daily To feet. 120 g 5       Family History  family history includes Aortic stenosis in his father; Diabetes in his father; Heart Disease (age of onset: 75) in his father; Heart Failure in his father; Hypertension in his father.  He also has a younger brother with type 1 diabetes using an insulin pump.    Social  "History  Smoke: none.  ETOH: none.    Past Medical History    1. SAMANTA/DM 1 - dx 2014.  Past Medical History:   Diagnosis Date    Diabetes (H) 2014    History of hepatitis A 2001   Adjustment disorder with mixed anxiety and depressed mood.    Physical Exam    /84   Pulse 73   Ht 1.854 m (6' 1\")   Wt 76.2 kg (168 lb)   BMI 22.16 kg/m       FEET: no ulcers.    RESULTS  Creatinine   Date Value Ref Range Status   09/27/2023 0.88 0.67 - 1.17 mg/dL Final   11/28/2020 0.89 0.66 - 1.25 mg/dL Final     GFR Estimate   Date Value Ref Range Status   09/27/2023 >90 >60 mL/min/1.73m2 Final   11/28/2020 >90 >60 mL/min/[1.73_m2] Final     Comment:     Non  GFR Calc  Starting 12/18/2018, serum creatinine based estimated GFR (eGFR) will be   calculated using the Chronic Kidney Disease Epidemiology Collaboration   (CKD-EPI) equation.       Hemoglobin A1C   Date Value Ref Range Status   09/27/2023 7.1 (H) <5.7 % Final     Comment:     Normal <5.7%   Prediabetes 5.7-6.4%    Diabetes 6.5% or higher     Note: Adopted from ADA consensus guidelines.   06/30/2021 7.3 (H) 0 - 5.6 % Final     Comment:     Normal <5.7% Prediabetes 5.7-6.4%  Diabetes 6.5% or higher - adopted from ADA   consensus guidelines.       Potassium   Date Value Ref Range Status   11/28/2020 4.5 3.4 - 5.3 mmol/L Final     ALT   Date Value Ref Range Status   11/28/2020 49 0 - 70 U/L Final     AST   Date Value Ref Range Status   09/27/2023 17 0 - 45 U/L Final     Comment:     Reference intervals for this test were updated on 6/12/2023 to more accurately reflect our healthy population. There may be differences in the flagging of prior results with similar values performed with this method. Interpretation of those prior results can be made in the context of the updated reference intervals.   11/28/2020 11 0 - 45 U/L Final     TSH   Date Value Ref Range Status   09/27/2023 1.18 0.30 - 4.20 uIU/mL Final   09/21/2022 1.39 0.40 - 4.00 mU/L Final "   06/30/2021 1.19 0.40 - 4.00 mU/L Final     T4 Free   Date Value Ref Range Status   12/11/2017 1.12 0.76 - 1.46 ng/dL Final       Cholesterol   Date Value Ref Range Status   09/27/2023 164 <200 mg/dL Final   09/21/2022 157 <200 mg/dL Final   11/28/2020 159 <200 mg/dL Final   09/17/2020 154.0 0.0 - 200.0 Final     HDL Cholesterol   Date Value Ref Range Status   11/28/2020 43 >39 mg/dL Final   09/17/2020 46.0 >40.0 Final     Direct Measure HDL   Date Value Ref Range Status   09/27/2023 50 >=40 mg/dL Final   09/21/2022 43 >=40 mg/dL Final     LDL Cholesterol Calculated   Date Value Ref Range Status   09/27/2023 102 (H) <=100 mg/dL Final   09/21/2022 102 (H) <=100 mg/dL Final   11/28/2020 103 (H) <100 mg/dL Final     Comment:     Above desirable:  100-129 mg/dl  Borderline High:  130-159 mg/dL  High:             160-189 mg/dL  Very high:       >189 mg/dl     02/27/2020 82 <100 mg/dL Final     Comment:     Desirable:       <100 mg/dl     LDL Cholesterol Direct   Date Value Ref Range Status   09/17/2020 86.0 0.0 - 129.0 Final     Triglycerides   Date Value Ref Range Status   09/27/2023 59 <150 mg/dL Final   09/21/2022 61 <150 mg/dL Final   11/28/2020 68 <150 mg/dL Final   09/17/2020 107.0 0.0 - 150.0 Final     Cholesterol/HDL Ratio   Date Value Ref Range Status   09/17/2020 3.3 0.0 - 5.0 Final   08/27/2015 3.0 0.0 - 5.0 Final       ASSESSMENT/PLAN:    1.  TYPE 1 DIABETES MELLITUS:  Piter had a + EJ antibody and C-peptide 0.8 ng/mL with glucose 144 in Nov 2019.  He started taking insulin in Nov 2019 and he is doing much better and has gained weight.  Most recent A1C was 7.1 % on 9/27/23.  Previous A1C was 7.4 % in March 2023.  I had him decrease his Lantus 13 units subcutaneous each am. He has had a few low FBS values.  Continue current Novolog I/C ratio.  Reminded him to take Novolog for all food intake and to take bolus insulin before eating.  He is to continue to monitor his blood sugar using his Freestyle Libre2  sensor.  Pt's urine microalbuminuria was negative in Sept 2022. Creat/GFR normal.  Piter was seen by Oph here in 11/2022 without retinopathy.  He plans to schedule his annual diabetic eye exam.  He reports less numbness in both feet. Seen by Podiatry.  Pt's LDL was 102 in 9/2022. He has worked on reducing fat in his diet. Declines statin.  /84 today.    2. FOLLOW UP: with me in 4 months.  A1C ordered.    Time spent reviewing chart, labs and Freestyle Libre2 sensor download data  today = 5 minutes.  Time for clinic  visit today = 25  minutes.  Time for documentation today = 15 minutes.    TOTAL TIME FOR VISIT TODAY =  45 minutes.    Sheila Brewer PA-C

## 2023-11-17 NOTE — PROGRESS NOTES
HPI  Piter Conway is a 48 year old male with type 1 diabetes mellitus.  Clinic visit today for diabetes follow up.  Pt was started on insulin in Nov 2019.  His EJ was + with C-peptide 0.8 ng/mL.  Piter reports having mild numbness in both feet with has improved. He has no hx of retinopathy or nephropathy.  For his diabetes, he is currently taking Lantus 14 units subcutaneous each am and Novolog 1 unit/15 gms CHO with meals with correction insulin.  Pt's A1C was 7.1 % on 9/27/2023.  His A1C was 7.4 % in March 2023.  I reviewed and scanned his Freestyle Libre2 sensor download data in his note below.  His average blood sugar is 133 with  an estimated A1C 6.5 % for the past 2 weeks.  His blood sugars are in target 84 % of the time with 3 % low. He has had some low FBS values.  On ROS today, less numbness in feet. Denies foot ulcers.  Seen by Podiatry.  He has gained weight since using insulin.  Pt denies frequent headaches,blurred vision, n/v, SOB,cough, fever, chills, chest pain,abd pain, diarrhea,dysuria or hematuria.    Diabetes Care  Retinopathy:none; he was seen by Oph in Nov 2022. Piter plans to scheduled his annual diabetic eye exam.  Nephropathy:none; pt's urine microalbuminuria negative in 9/2023.  Neuropathy: mild numbness in both feet.  Foot Exam:no ulcers.  Taking aspirin:no  Lipids:  in 9/2022.  CAD: no.  Mental health: dx of adjustment disorder with mixed anxiety and depressed mood during the COVID pandemic which has improved per patient.  Insulin: Basal and meal time insulin with correction scale.  Testing: Freestyle Libre2 sensor.             ROS  Please see under HPI.    Allergies  No Known Allergies    Medications  Current Outpatient Medications   Medication Sig Dispense Refill    ACCU-CHEK GUIDE test strip TEST BLOOD SUGAR TWO TIMES A DAY OR AS DIRECTED 200 strip 2    ammonium lactate (AMLACTIN) 12 % external cream Apply topically daily To feet. 385 g 5    blood glucose monitoring (ACCU-CHEK  "FASTCLIX) lancets TEST TWO TIMES A  each 3    celecoxib (CELEBREX) 200 MG capsule Take 1 capsule (200 mg) by mouth daily 90 capsule 1    ciclopirox (LOPROX) 0.77 % cream Apply topically 2 times daily To feet. 90 g 5    Continuous Blood Gluc Sensor (FREESTYLE NAVEED 2 SENSOR) MISC Change every 14 days. 6 each 11    DiphenhydrAMINE HCl (BENADRYL ALLERGY PO) Take 1 tablet by mouth daily      fluticasone (FLONASE) 50 MCG/ACT nasal spray Spray 2 sprays into both nostrils daily 16 g 3    insulin aspart (NOVOLOG FLEXPEN) 100 UNIT/ML pen INJECT 1 UNIT UNDER THE SKIN PER 15 GRAMS OF CARB PLUS 1 UNIT PER 50 ABOVE 150 (MAX DOSE 50 UNITS) 45 mL 3    insulin glargine (LANTUS SOLOSTAR) 100 UNIT/ML pen INJECT 13 UNITS SUBCUTANEOUS EVERY MORNING.PLEASE DO NOT FILL TODAY( 11/14/2023). 15 mL 4    insulin pen needle (BD MICH U/F) 32G X 4 MM miscellaneous USE 4 PEN NEEDLES DAILY OR AS DIRECTED 400 each 3    omeprazole (PRILOSEC) 20 MG DR capsule Take 20 mg po twice daily 180 capsule 3    polyethylene glycol (MIRALAX) powder Take 17 g (1 capful) by mouth 2 times daily as needed for constipation 510 g 1    urea (GORMEL) 20 % external cream Apply topically daily To feet. 120 g 5       Family History  family history includes Aortic stenosis in his father; Diabetes in his father; Heart Disease (age of onset: 75) in his father; Heart Failure in his father; Hypertension in his father.  He also has a younger brother with type 1 diabetes using an insulin pump.    Social History  Smoke: none.  ETOH: none.    Past Medical History    1. SAMANTA/DM 1 - dx 2014.  Past Medical History:   Diagnosis Date    Diabetes (H) 2014    History of hepatitis A 2001   Adjustment disorder with mixed anxiety and depressed mood.    Physical Exam    /84   Pulse 73   Ht 1.854 m (6' 1\")   Wt 76.2 kg (168 lb)   BMI 22.16 kg/m       FEET: no ulcers.    RESULTS  Creatinine   Date Value Ref Range Status   09/27/2023 0.88 0.67 - 1.17 mg/dL Final   11/28/2020 " 0.89 0.66 - 1.25 mg/dL Final     GFR Estimate   Date Value Ref Range Status   09/27/2023 >90 >60 mL/min/1.73m2 Final   11/28/2020 >90 >60 mL/min/[1.73_m2] Final     Comment:     Non  GFR Calc  Starting 12/18/2018, serum creatinine based estimated GFR (eGFR) will be   calculated using the Chronic Kidney Disease Epidemiology Collaboration   (CKD-EPI) equation.       Hemoglobin A1C   Date Value Ref Range Status   09/27/2023 7.1 (H) <5.7 % Final     Comment:     Normal <5.7%   Prediabetes 5.7-6.4%    Diabetes 6.5% or higher     Note: Adopted from ADA consensus guidelines.   06/30/2021 7.3 (H) 0 - 5.6 % Final     Comment:     Normal <5.7% Prediabetes 5.7-6.4%  Diabetes 6.5% or higher - adopted from ADA   consensus guidelines.       Potassium   Date Value Ref Range Status   11/28/2020 4.5 3.4 - 5.3 mmol/L Final     ALT   Date Value Ref Range Status   11/28/2020 49 0 - 70 U/L Final     AST   Date Value Ref Range Status   09/27/2023 17 0 - 45 U/L Final     Comment:     Reference intervals for this test were updated on 6/12/2023 to more accurately reflect our healthy population. There may be differences in the flagging of prior results with similar values performed with this method. Interpretation of those prior results can be made in the context of the updated reference intervals.   11/28/2020 11 0 - 45 U/L Final     TSH   Date Value Ref Range Status   09/27/2023 1.18 0.30 - 4.20 uIU/mL Final   09/21/2022 1.39 0.40 - 4.00 mU/L Final   06/30/2021 1.19 0.40 - 4.00 mU/L Final     T4 Free   Date Value Ref Range Status   12/11/2017 1.12 0.76 - 1.46 ng/dL Final       Cholesterol   Date Value Ref Range Status   09/27/2023 164 <200 mg/dL Final   09/21/2022 157 <200 mg/dL Final   11/28/2020 159 <200 mg/dL Final   09/17/2020 154.0 0.0 - 200.0 Final     HDL Cholesterol   Date Value Ref Range Status   11/28/2020 43 >39 mg/dL Final   09/17/2020 46.0 >40.0 Final     Direct Measure HDL   Date Value Ref Range Status    09/27/2023 50 >=40 mg/dL Final   09/21/2022 43 >=40 mg/dL Final     LDL Cholesterol Calculated   Date Value Ref Range Status   09/27/2023 102 (H) <=100 mg/dL Final   09/21/2022 102 (H) <=100 mg/dL Final   11/28/2020 103 (H) <100 mg/dL Final     Comment:     Above desirable:  100-129 mg/dl  Borderline High:  130-159 mg/dL  High:             160-189 mg/dL  Very high:       >189 mg/dl     02/27/2020 82 <100 mg/dL Final     Comment:     Desirable:       <100 mg/dl     LDL Cholesterol Direct   Date Value Ref Range Status   09/17/2020 86.0 0.0 - 129.0 Final     Triglycerides   Date Value Ref Range Status   09/27/2023 59 <150 mg/dL Final   09/21/2022 61 <150 mg/dL Final   11/28/2020 68 <150 mg/dL Final   09/17/2020 107.0 0.0 - 150.0 Final     Cholesterol/HDL Ratio   Date Value Ref Range Status   09/17/2020 3.3 0.0 - 5.0 Final   08/27/2015 3.0 0.0 - 5.0 Final       ASSESSMENT/PLAN:    1.  TYPE 1 DIABETES MELLITUS:  Piter had a + EJ antibody and C-peptide 0.8 ng/mL with glucose 144 in Nov 2019.  He started taking insulin in Nov 2019 and he is doing much better and has gained weight.  Most recent A1C was 7.1 % on 9/27/23.  Previous A1C was 7.4 % in March 2023.  I had him decrease his Lantus 13 units subcutaneous each am. He has had a few low FBS values.  Continue current Novolog I/C ratio.  Reminded him to take Novolog for all food intake and to take bolus insulin before eating.  He is to continue to monitor his blood sugar using his Freestyle Libre2 sensor.  Pt's urine microalbuminuria was negative in Sept 2022. Creat/GFR normal.  Piter was seen by Oph here in 11/2022 without retinopathy.  He plans to schedule his annual diabetic eye exam.  He reports less numbness in both feet. Seen by Podiatry.  Pt's LDL was 102 in 9/2022. He has worked on reducing fat in his diet. Declines statin.  /84 today.    2. FOLLOW UP: with me in 4 months.  A1C ordered.    Time spent reviewing chart, labs and Freestyle Libre2 sensor download  data  today = 5 minutes.  Time for clinic  visit today = 25  minutes.  Time for documentation today = 15 minutes.    TOTAL TIME FOR VISIT TODAY =  45 minutes.    Sheila Brewer PA-C

## 2023-12-11 ENCOUNTER — OFFICE VISIT (OUTPATIENT)
Dept: OPHTHALMOLOGY | Facility: CLINIC | Age: 48
End: 2023-12-11
Attending: STUDENT IN AN ORGANIZED HEALTH CARE EDUCATION/TRAINING PROGRAM
Payer: COMMERCIAL

## 2023-12-11 DIAGNOSIS — E10.65 TYPE 1 DIABETES MELLITUS WITH HYPERGLYCEMIA (H): Primary | ICD-10-CM

## 2023-12-11 DIAGNOSIS — H25.13 NUCLEAR SCLEROTIC CATARACT OF BOTH EYES: ICD-10-CM

## 2023-12-11 DIAGNOSIS — H52.13 MYOPIA OF BOTH EYES WITH ASTIGMATISM AND PRESBYOPIA: ICD-10-CM

## 2023-12-11 DIAGNOSIS — H52.4 MYOPIA OF BOTH EYES WITH ASTIGMATISM AND PRESBYOPIA: ICD-10-CM

## 2023-12-11 DIAGNOSIS — H52.203 MYOPIA OF BOTH EYES WITH ASTIGMATISM AND PRESBYOPIA: ICD-10-CM

## 2023-12-11 PROCEDURE — 92015 DETERMINE REFRACTIVE STATE: CPT

## 2023-12-11 PROCEDURE — 92014 COMPRE OPH EXAM EST PT 1/>: CPT | Performed by: STUDENT IN AN ORGANIZED HEALTH CARE EDUCATION/TRAINING PROGRAM

## 2023-12-11 PROCEDURE — G0463 HOSPITAL OUTPT CLINIC VISIT: HCPCS | Performed by: STUDENT IN AN ORGANIZED HEALTH CARE EDUCATION/TRAINING PROGRAM

## 2023-12-11 ASSESSMENT — CONF VISUAL FIELD
OD_SUPERIOR_TEMPORAL_RESTRICTION: 0
OS_NORMAL: 1
OD_INFERIOR_NASAL_RESTRICTION: 0
OS_SUPERIOR_TEMPORAL_RESTRICTION: 0
OS_SUPERIOR_NASAL_RESTRICTION: 0
OD_NORMAL: 1
OS_INFERIOR_TEMPORAL_RESTRICTION: 0
OS_INFERIOR_NASAL_RESTRICTION: 0
OD_SUPERIOR_NASAL_RESTRICTION: 0
OD_INFERIOR_TEMPORAL_RESTRICTION: 0

## 2023-12-11 ASSESSMENT — REFRACTION_MANIFEST
OD_ADD: +2.00
OD_AXIS: 004
OD_CYLINDER: +0.50
OS_ADD: +2.00
OS_SPHERE: -0.75
OS_CYLINDER: +1.25
OD_SPHERE: -0.75
OS_AXIS: 008

## 2023-12-11 ASSESSMENT — EXTERNAL EXAM - RIGHT EYE: OD_EXAM: NORMAL

## 2023-12-11 ASSESSMENT — EXTERNAL EXAM - LEFT EYE: OS_EXAM: NORMAL

## 2023-12-11 ASSESSMENT — TONOMETRY
OD_IOP_MMHG: 15
OS_IOP_MMHG: 15
IOP_METHOD: TONOPEN

## 2023-12-11 ASSESSMENT — VISUAL ACUITY
OD_SC: 20/20
OS_SC: 20/25
METHOD: SNELLEN - LINEAR

## 2023-12-11 ASSESSMENT — SLIT LAMP EXAM - LIDS
COMMENTS: NORMAL
COMMENTS: NORMAL

## 2023-12-11 ASSESSMENT — CUP TO DISC RATIO
OS_RATIO: 0.4
OD_RATIO: 0.4

## 2023-12-11 NOTE — PROGRESS NOTES
HPI    Pt. States that he is doing well. No change in VA BE. No pain or dryness BE. Occasional flashes BE. No floaters BE.   Lab Results       Component                Value               Date                       A1C                      7.1                 09/27/2023                 A1C                      7.4                 03/27/2023                 A1C                      7.9                 09/21/2022                 A1C                      6.7                 02/25/2022                 A1C                      7.0                 10/05/2021                 A1C                      7.3                 06/30/2021                 A1C                      7.2                 11/28/2020                 A1C                      7.6                 09/17/2020                 A1C                      6.9                 03/04/2019                 A1C                      6.3                 12/11/2017            Ivonne Hawkins COT 9:49 AM December 11, 2023     Last edited by Ivonne Hawkins on 12/11/2023  9:49 AM.          Review of systems for the eyes was negative other than the pertinent positives/negatives listed in the HPI.    Ocular Meds: ATs PRN OU otc anti-allergy drop PRN OU    Ocular Hx: allergic conjunctivitis OU    FOHx: no family history of glaucoma or blindness    PMHx: T1DM    Assessment & Plan      Piter Conway is a 48 year old male with the following diagnoses:    T1DM without Retinopathy OU  - Diagnosed: 2014  - strict BP/BG control  - patient understands importance  of good blood glucose control in order to reduce the risk of developing diabetic retinopathy  - yearly DFE    Allergic Conjunctivitis OU  - seasonal symptoms; doing okay today off of drops  - Uses anti-histamine and nasal spray when needed  - Ok to continue with OTC anti-allergy drops    Nuclear sclerotic cataract OU  - not visually significant  - observe    RPE changes left eye  - mild; not visually significant  - baseline OCT macula  next visit  - amsler precautions  - observe    Myopia of both eyes with astigmatism and presbyopia  - new MRx for glasses dispensed to patient with excellent BCVA    Counseled return/RD precautions    Patient disposition:   Return in about 1 year (around 12/11/2024) for Annual Visit, OCT Macula, or sooner changes.    Attending Physician Attestation:  Complete documentation of historical and exam elements from today's encounter can be found in the full encounter summary report (not reduplicated in this progress note).  I personally obtained the chief complaint(s) and history of present illness.  I confirmed and edited as necessary the review of systems, past medical/surgical history, family history, social history, and examination findings as documented by others; and I examined the patient myself.  I personally reviewed the relevant tests, images, and reports as documented above.  I formulated and edited as necessary the assessment and plan and discussed the findings and management plan with the patient and family. . - Marlena Wilcox MD

## 2023-12-11 NOTE — NURSING NOTE
Chief Complaints and History of Present Illnesses   Patient presents with    Diabetic Eye Exam     Chief Complaint(s) and History of Present Illness(es)       Diabetic Eye Exam               Comments    Pt. States that he is doing well. No change in VA BE. No pain or dryness BE. Occasional flashes BE. No floaters BE.   Lab Results       Component                Value               Date                       A1C                      7.1                 09/27/2023                 A1C                      7.4                 03/27/2023                 A1C                      7.9                 09/21/2022                 A1C                      6.7                 02/25/2022                 A1C                      7.0                 10/05/2021                 A1C                      7.3                 06/30/2021                 A1C                      7.2                 11/28/2020                 A1C                      7.6                 09/17/2020                 A1C                      6.9                 03/04/2019                 A1C                      6.3                 12/11/2017            Ivonne Hawkins COT 9:49 AM December 11, 2023

## 2023-12-12 ENCOUNTER — TELEPHONE (OUTPATIENT)
Dept: ENDOCRINOLOGY | Facility: CLINIC | Age: 48
End: 2023-12-12
Payer: COMMERCIAL

## 2023-12-12 NOTE — TELEPHONE ENCOUNTER
Central Prior Authorization Team   Phone: [phone]    PA Initiation    Medication: Pen Needles - NDC [address]-41  Insurance Company: [employer] - Phone [phone] Fax [phone]  Pharmacy Filling the Rx: [place], [place] - [address]  Filling Pharmacy Phone: [phone]  Filling Pharmacy Fax:    Start Date: 12/12/2023

## 2023-12-12 NOTE — TELEPHONE ENCOUNTER
Prior Authorization Approval    Authorization Effective Date: 11/12/2023  Authorization Expiration Date: 3/11/2024  Medication: Joshua Needles - NDC [address]-62  Approved Dose/Quantity:   Reference #:     Insurance Company: [employer] - Phone [phone] Fax [phone]  Expected CoPay:       CoPay Card Available:      Foundation Assistance Needed:    Which Pharmacy is filling the prescription (Not needed for infusion/clinic administered): [facility] [place], [place] - [address]  Pharmacy Notified:  yes  Patient Notified:  yes- Pharmacy will contact patient when ready to /ship

## 2023-12-12 NOTE — TELEPHONE ENCOUNTER
Prior Authorization Retail Medication Request    Medication/Dose: Pen Kennedy - NDC [address]-15  Diagnosis and ICD code (if different than what is on RX):    New/renewal/insurance change PA/secondary ins. PA:  Previously Tried and Failed:    Rationale:      Insurance   Primary: Nitza CHERRY  Insurance ID:  [id number]    Currently, the covered pen needles are on backorder. The available NDC is [address]-63.    Thank you,  Yvette Botello, [facility]  Technician Supervisor  [facility]  [phone]

## 2023-12-14 ENCOUNTER — OFFICE VISIT (OUTPATIENT)
Dept: ENDOCRINOLOGY | Facility: CLINIC | Age: 48
End: 2023-12-14
Payer: COMMERCIAL

## 2023-12-14 DIAGNOSIS — E10.42 DM TYPE 1 WITH DIABETIC PERIPHERAL NEUROPATHY (H): Primary | ICD-10-CM

## 2023-12-14 DIAGNOSIS — B35.3 TINEA PEDIS OF BOTH FEET: ICD-10-CM

## 2023-12-14 PROCEDURE — 99214 OFFICE O/P EST MOD 30 MIN: CPT | Performed by: PODIATRIST

## 2023-12-14 RX ORDER — CICLOPIROX OLAMINE 7.7 MG/G
CREAM TOPICAL 2 TIMES DAILY
Qty: 90 G | Refills: 5 | Status: SHIPPED | OUTPATIENT
Start: 2023-12-14

## 2023-12-14 NOTE — LETTER
12/14/2023       RE: Piter CHENG Said  [address] [place] [id number]     Dear Colleague,    Thank you for referring your patient, Piter CHENG Said, to the [facility] [place] [facility]. Please see a copy of my visit note below.    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)    Adjustment disorder with mixed anxiety and depressed mood    Upper back pain    Cervicalgia    Acute pain of right shoulder    Acute low back pain with radicular symptoms, duration less than 6 weeks    Chronic bilateral low back pain with left-sided sciatica    Bilateral low back pain without sciatica    Lateral epicondylitis of both elbows    Tension headache    Lumbar disc herniation     Past Surgical History:   Procedure Laterality Date    ESOPHAGOSCOPY, GASTROSCOPY, DUODENOSCOPY (EGD), COMBINED N/A 12/1/2020    Procedure: ESOPHAGOGASTRODUODENOSCOPY, WITH BIOPSY;  Surgeon: Fabio Mota DO;  [place] [facility] [place]    ESOPHAGOSCOPY, GASTROSCOPY, DUODENOSCOPY (EGD), COMBINED N/A 1/3/2023    Procedure: ESOPHAGOGASTRODUODENOSCOPY (EGD);  Surgeon: Sarah Stafford MD;  [place] [facility] [place]    NO HISTORY OF SURGERY       Social History     Socioeconomic History    Marital status:      Spouse name: Not on file    Number of children: 3    Years of education: Not on file    Highest education level: Not on file   Occupational History    Occupation: medical transport   Tobacco Use    Smoking status: Never    Smokeless tobacco: Never   Vaping Use    Vaping Use: Never used   Substance and Sexual Activity    Alcohol use: No    Drug use: No    Sexual activity: Yes     Partners: Female     Comment:    Other Topics Concern     Parent/sibling w/ CABG, MI or angioplasty before 65F 55M? Not Asked   Social History Narrative    Not on file     Social Determinants of Health     Financial Resource Strain: Not on file   Food Insecurity: Not on file   Transportation Needs: Not on file   Physical Activity: Not on file   Stress: Not on file   Social Connections: Not on file   Interpersonal Safety: Not on file   Housing Stability: Not on file     Family History   Problem Relation Age of Onset    Hypertension Father     Diabetes Father     Heart Disease Father 75    Heart Failure Father     Aortic stenosis Father     Glaucoma No family hx of     Macular Degeneration No family hx of        Lab Results   Component Value Date    A1C 7.1 09/27/2023    A1C 7.4 03/27/2023    A1C 7.9 09/21/2022    A1C 6.7 02/25/2022    A1C 7.0 10/05/2021    A1C 7.3 06/30/2021    A1C 7.2 11/28/2020    A1C 7.6 09/17/2020    A1C 6.9 03/04/2019    A1C 6.3 12/11/2017         Subjective findings- 48-year-old returns clinic for diabetic foot cares.  He relates he feels the cracking is coming back on the heels, has diabetic shoes, has been using AmLactin cream, feels it is not working as well as the Loprox cream, relates to no ulcers or sores since we seen him last, no injuries.    Objective findings- DP and PT are 2 out of 4 bilaterally.  No gross tendon voids bilaterally.  Has mild dry cracked hyperkeratotic skin on the right greater than left heel and first MPJs.  There is no erythema, no drainage, no odor, no calor, no pain on palpation bilaterally.  Has dorsally contracted digits bilaterally with functional hallux limitus bilaterally.    Assessment and plan- Diabetes with peripheral Neuropathy, Tinea Pedis bilaterally.  Diagnosis and treatment options discussed with the patient.  Prescription for Loprox cream given use discussed with them.  Return to clinic and see me in 3 months.  Previous notes reviewed.              Moderate level of medical decision making.      Again,  thank you for allowing me to participate in the care of your patient.      Sincerely,    Yazan Parker DPM

## 2023-12-14 NOTE — PROGRESS NOTES
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)    Adjustment disorder with mixed anxiety and depressed mood    Upper back pain    Cervicalgia    Acute pain of right shoulder    Acute low back pain with radicular symptoms, duration less than 6 weeks    Chronic bilateral low back pain with left-sided sciatica    Bilateral low back pain without sciatica    Lateral epicondylitis of both elbows    Tension headache    Lumbar disc herniation     Past Surgical History:   Procedure Laterality Date    ESOPHAGOSCOPY, GASTROSCOPY, DUODENOSCOPY (EGD), COMBINED N/A 12/1/2020    Procedure: ESOPHAGOGASTRODUODENOSCOPY, WITH BIOPSY;  Surgeon: Fabio Mota DO;  [place] [facility] [place]    ESOPHAGOSCOPY, GASTROSCOPY, DUODENOSCOPY (EGD), COMBINED N/A 1/3/2023    Procedure: ESOPHAGOGASTRODUODENOSCOPY (EGD);  Surgeon: Sarah Stafford MD;  Location: [facility] [place]    NO HISTORY OF SURGERY       Social History     Socioeconomic History    Marital status:      Spouse name: Not on file    Number of children: 3    Years of education: Not on file    Highest education level: Not on file   Occupational History    Occupation: medical transport   Tobacco Use    Smoking status: Never    Smokeless tobacco: Never   Vaping Use    Vaping Use: Never used   Substance and Sexual Activity    Alcohol use: No    Drug use: No    Sexual activity: Yes     Partners: Female     Comment:    Other Topics Concern    Parent/sibling w/ CABG, MI or angioplasty before 65F 55M? Not Asked   Social History Narrative    Not on file     Social Determinants of Health     Financial Resource Strain: Not on file   Food Insecurity: Not on file   Transportation Needs: Not on file   Physical Activity: Not on file   Stress: Not on file   Social  Connections: Not on file   Interpersonal Safety: Not on file   Housing Stability: Not on file     Family History   Problem Relation Age of Onset    Hypertension Father     Diabetes Father     Heart Disease Father 75    Heart Failure Father     Aortic stenosis Father     Glaucoma No family hx of     Macular Degeneration No family hx of        Lab Results   Component Value Date    A1C 7.1 09/27/2023    A1C 7.4 03/27/2023    A1C 7.9 09/21/2022    A1C 6.7 02/25/2022    A1C 7.0 10/05/2021    A1C 7.3 06/30/2021    A1C 7.2 11/28/2020    A1C 7.6 09/17/2020    A1C 6.9 03/04/2019    A1C 6.3 12/11/2017         Subjective findings- 48-year-old returns clinic for diabetic foot cares.  He relates he feels the cracking is coming back on the heels, has diabetic shoes, has been using AmLactin cream, feels it is not working as well as the Loprox cream, relates to no ulcers or sores since we seen him last, no injuries.    Objective findings- DP and PT are 2 out of 4 bilaterally.  No gross tendon voids bilaterally.  Has mild dry cracked hyperkeratotic skin on the right greater than left heel and first MPJs.  There is no erythema, no drainage, no odor, no calor, no pain on palpation bilaterally.  Has dorsally contracted digits bilaterally with functional hallux limitus bilaterally.    Assessment and plan- Diabetes with peripheral Neuropathy, Tinea Pedis bilaterally.  Diagnosis and treatment options discussed with the patient.  Prescription for Loprox cream given use discussed with them.  Return to clinic and see me in 3 months.  Previous notes reviewed.              Moderate level of medical decision making.

## 2024-02-01 NOTE — PROGRESS NOTES
"Piter Conway is a 49 year old male with hx of latent autoimmune DM, hx of low back pain, neck pain, seasonal allergies, atopic dermatitis, tension headache.  He is here for a general check up.  He is not fasting. He is due for eye exams (no retinopathy, reading glasses) and due for dental visits.    HCM  Advanced directive: none on file, info given  COVID vaccine wishes to do today  Other Vaccines flu up to date, due for Tdap, Prevnar 20  Colon cancer screening baseline due  PSA due for baseline today    Diet: wide variety  Wt Readings from Last 4 Encounters:   02/02/24 76.8 kg (169 lb 4 oz)   11/14/23 76.2 kg (168 lb)   08/01/23 77.2 kg (170 lb 1.6 oz)   06/07/23 78 kg (172 lb)     Body mass index is 22.38 kg/m .    Insulin dependent DM  Followed at endocrine clinic  Lantus 13 U daily,  down from 14U. Hypoglycemia improved after dose adjusted  Novolog pen: counting carbs  CGM  Lab Results   Component Value Date    A1C 7.1 09/27/2023    A1C 7.4 03/27/2023    A1C 7.9 09/21/2022     Joint pain  Knee pain over past month  No injury, no pain at rest  Squatting triggers pain at lateral side of his knees  Rest improves symptoms.   Celebrex 200mg daily helping    Lower back   Hx of lumbar disc, doing home exercises  Pain is stable    Both hips  Exercise triggers pain at lateral hips, no radiation    Schatzke ring  EGD 1/2023, +hiatal hernia, non obstructing schatzke ring  On omeprazole, no current break through sx or dysphagia    PMH, PSH, FH, medications, allergies and immunizations are updated this visit.      Social  , 3 children  Works as , [employer]    HABITS:  Tob: none  ETOH: none  Calcium: calcium minimal  Vitamin D- \"not taking as it triggered bladder pain\"  Caffeine: tea 1 per day  Exercise: calesthetics, running, lifting weights, 3x per week    MALE ROS  Partner: spouse  BPH: + dribbling, mild symptoms      Current Outpatient Medications   Medication Sig Dispense Refill    ACCU-CHEK GUIDE " test strip TEST BLOOD SUGAR TWO TIMES A DAY OR AS DIRECTED 200 strip 2    ammonium lactate (AMLACTIN) 12 % external cream Apply topically daily To feet. 385 g 5    blood glucose monitoring (ACCU-CHEK FASTCLIX) lancets TEST TWO TIMES A  each 3    celecoxib (CELEBREX) 200 MG capsule Take 1 capsule (200 mg) by mouth daily 90 capsule 1    ciclopirox (LOPROX) 0.77 % cream Apply topically 2 times daily To feet. 90 g 5    Continuous Blood Gluc Sensor (FREESTYLE NAVEED 2 SENSOR) MISC Change every 14 days. 6 each 11    DiphenhydrAMINE HCl (BENADRYL ALLERGY PO) Take 1 tablet by mouth daily      fluticasone (FLONASE) 50 MCG/ACT nasal spray Spray 2 sprays into both nostrils daily 16 g 3    insulin aspart (NOVOLOG FLEXPEN) 100 UNIT/ML pen INJECT 1 UNIT UNDER THE SKIN PER 15 GRAMS OF CARB PLUS 1 UNIT PER 50 ABOVE 150 (MAX DOSE 50 UNITS) 45 mL 3    insulin glargine (LANTUS SOLOSTAR) 100 UNIT/ML pen INJECT 13 UNITS SUBCUTANEOUS EVERY MORNING.PLEASE DO NOT FILL TODAY( 11/14/2023). 15 mL 4    insulin pen needle (BD MICH U/F) 32G X 4 MM miscellaneous USE 4 PEN NEEDLES DAILY OR AS DIRECTED 400 each 3    omeprazole (PRILOSEC) 20 MG DR capsule Take 20 mg po twice daily 180 capsule 3    polyethylene glycol (MIRALAX) powder Take 17 g (1 capful) by mouth 2 times daily as needed for constipation 510 g 1    urea (GORMEL) 20 % external cream Apply topically daily To feet. 120 g 5     No Known Allergies      ROS  CONSTITUTIONAL:NEGATIVE for fever, chills, change in weight  INTEGUMENTARY/SKIN: NEGATIVE for worrisome rashes, moles or lesions  EYES: NEGATIVE for vision changes or irritation  ENT/MOUTH: NEGATIVE for ear, mouth and throat problems  RESP:NEGATIVE for significant cough or SOB  CV: NEGATIVE for chest pain, palpitations, BROWN, orthopnea, PND  or peripheral edema  GI: NEGATIVE for nausea, abdominal pain,  or change in bowel habits +hx of GERD, schatzke ring, stable  :NEGATIVE for frequency, dysuria, or  "hematuria  MUSCULOSKELETAL:+lateral knee pain with squatting, hx of LBP, stable  NEURO: NEGATIVE for weakness, dizziness or paresthesias  ENDOCRINE: NEGATIVE for polyuria/dipsia,  temperature intolerance, skin/hair changes  +hx of insulin dependent DM  HEME/ALLERGY/IMMUNE: NEGATIVE for bleeding problems  PSYCHIATRIC: NEGATIVE for changes in mood or affect    EXAM  /78 (BP Location: Left arm, Patient Position: Sitting, Cuff Size: Adult Large)   Pulse 79   Temp 97.2  F (36.2  C) (Temporal)   Resp 16   Ht 1.852 m (6' 0.91\")   Wt 76.8 kg (169 lb 4 oz)   SpO2 97%   BMI 22.38 kg/m    GENERAL APPEARANCE: Alert, pleasant, NAD  EYES: PERRL, EOMI, conjunctiva clear  HENT: TM normal bilaterally. Nose and mouth without lesions  NECK: no adenopathy, thyroid normal to palpation  RESP: lungs clear to auscultation bilaterally  Axillae: no palpable axillary masses or adenopathy  CV: regular rate and rhythm, normal S1 S2, no murmur, no carotid bruits  ABDOMEN: soft, nontender, without HSM or masses. Bowel sounds normal  MS: no pain over bony LS spine.   Knees: no redness warmth or swelling, cannot reproduce pain at lateral bony knee landmarks  Hips:   point tenderness over greater trochanters and along IT band  SKIN: no suspicious lesions or rashes, Generalized dry, flaky skin  NEURO: Normal strength and tone, sensory exam grossly normal, DTR normoreflexive in upper and lower extremities  PSYCH: mentation appears normal. and affect normal/bright.  EXT: no peripheral edema, pedal pulses palpable    Assessment:  (Z00.00) Routine history and physical examination of adult  (primary encounter diagnosis)  Comment: 49 year old male in good health  Plan:Today we discussed ways to maintain a healthy lifestyle with sensible eating, regular exercise and self cares. We dicussed calcium and Vitamin D intake, vaccinations and preventive health screens.   He will return for Prevnar vaccine at nurse visit      (Z23) Need for " diphtheria-tetanus-pertussis (Tdap) vaccine  Comment: due for routine vaccine  Plan: TDAP VACCINE (Adacel, Boostrix)  [[id number]],         COVID-19 12+ (2023-24) (MODERNA)        given    (Z12.11) Screening for colon cancer  Comment: due for baseline screening  Plan: Colonoscopy Screening  Referral        Referral given    (Z12.5) Screening for prostate cancer  Comment: mild BPH symptoms  Plan: PSA tumor marker            (M51.26) Lumbar disc herniation  Comment: overall, symptoms are stable, using Celebrex   Plan: celecoxib (CELEBREX) 200 MG capsule        Continue home exercise program     (Z13.21) Encounter for vitamin deficiency screening  Comment: hx of low D . Today's level is 21  Plan: Vitamin D Deficiency        Recommend vitamin D supplement and calcium 1200mg daily to maintain good bone health    (M25.561,  M25.562,  G89.29) Chronic pain of both knees  Comment: pain at lateral bony landmarks with squatting, no redness, swelling or warmth  Plan: Basic Metabolic Panel ([facility]), Orthopedic          Referral        Refer to sports clinic for evaluation and treatment plan    (Z23) Need for pneumococcal vaccine  Comment: hx of DM, due for vaccine  Plan: PNEUMOCOCCAL 20 VALENT CONJUGATE (PREVNAR 20)        He will return for this vaccine    (E04.9) Thyroid enlargement  Comment: symmetric enlarge gland, nontender  TSH   Date Value Ref Range Status   02/02/2024 2.22 0.30 - 4.20 uIU/mL Final   09/21/2022 1.39 0.40 - 4.00 mU/L Final   06/30/2021 1.19 0.40 - 4.00 mU/L Final   Plan: TSH with free T4 reflex        Check yearly    (E13.9) SAMANTA (latent autoimmune diabetes mellitus in adults) (H)  Comment: doing very well, mild numbness over fore feet  Lab Results   Component Value Date    A1C 6.8 02/02/2024    A1C 7.1 09/27/2023    A1C 7.4 03/27/2023   Plan: [place] Diabetic foot exam   continue with current medication, follow up with diabetic clinic    Rosamaria Banerjee MD  Internal Medicine/Pediatrics

## 2024-02-02 ENCOUNTER — OFFICE VISIT (OUTPATIENT)
Dept: FAMILY MEDICINE | Facility: CLINIC | Age: 49
End: 2024-02-02
Payer: COMMERCIAL

## 2024-02-02 VITALS
HEIGHT: 73 IN | TEMPERATURE: 97.2 F | SYSTOLIC BLOOD PRESSURE: 113 MMHG | RESPIRATION RATE: 16 BRPM | HEART RATE: 79 BPM | BODY MASS INDEX: 22.43 KG/M2 | DIASTOLIC BLOOD PRESSURE: 78 MMHG | WEIGHT: 169.25 LBS | OXYGEN SATURATION: 97 %

## 2024-02-02 DIAGNOSIS — E04.9 THYROID ENLARGEMENT: ICD-10-CM

## 2024-02-02 DIAGNOSIS — M25.561 CHRONIC PAIN OF BOTH KNEES: ICD-10-CM

## 2024-02-02 DIAGNOSIS — Z13.21 ENCOUNTER FOR VITAMIN DEFICIENCY SCREENING: ICD-10-CM

## 2024-02-02 DIAGNOSIS — E13.9 LADA (LATENT AUTOIMMUNE DIABETES MELLITUS IN ADULTS) (H): ICD-10-CM

## 2024-02-02 DIAGNOSIS — G89.29 CHRONIC PAIN OF BOTH KNEES: ICD-10-CM

## 2024-02-02 DIAGNOSIS — Z12.5 SCREENING FOR PROSTATE CANCER: ICD-10-CM

## 2024-02-02 DIAGNOSIS — Z23 NEED FOR DIPHTHERIA-TETANUS-PERTUSSIS (TDAP) VACCINE: ICD-10-CM

## 2024-02-02 DIAGNOSIS — Z12.11 SCREENING FOR COLON CANCER: ICD-10-CM

## 2024-02-02 DIAGNOSIS — Z00.00 ROUTINE HISTORY AND PHYSICAL EXAMINATION OF ADULT: Primary | ICD-10-CM

## 2024-02-02 DIAGNOSIS — Z23 NEED FOR PNEUMOCOCCAL VACCINE: ICD-10-CM

## 2024-02-02 DIAGNOSIS — M51.26 LUMBAR DISC HERNIATION: ICD-10-CM

## 2024-02-02 DIAGNOSIS — M25.562 CHRONIC PAIN OF BOTH KNEES: ICD-10-CM

## 2024-02-02 LAB — HBA1C MFR BLD: 6.8 % (ref 0–5.6)

## 2024-02-02 PROCEDURE — 84443 ASSAY THYROID STIM HORMONE: CPT | Performed by: INTERNAL MEDICINE

## 2024-02-02 PROCEDURE — 80048 BASIC METABOLIC PNL TOTAL CA: CPT | Performed by: INTERNAL MEDICINE

## 2024-02-02 PROCEDURE — 82306 VITAMIN D 25 HYDROXY: CPT | Performed by: INTERNAL MEDICINE

## 2024-02-02 PROCEDURE — 84153 ASSAY OF PSA TOTAL: CPT | Performed by: INTERNAL MEDICINE

## 2024-02-02 RX ORDER — CELECOXIB 200 MG/1
200 CAPSULE ORAL DAILY
Qty: 90 CAPSULE | Refills: 1 | Status: SHIPPED | OUTPATIENT
Start: 2024-02-02

## 2024-02-02 SDOH — HEALTH STABILITY: PHYSICAL HEALTH: ON AVERAGE, HOW MANY DAYS PER WEEK DO YOU ENGAGE IN MODERATE TO STRENUOUS EXERCISE (LIKE A BRISK WALK)?: 3 DAYS

## 2024-02-02 SDOH — HEALTH STABILITY: PHYSICAL HEALTH: ON AVERAGE, HOW MANY MINUTES DO YOU ENGAGE IN EXERCISE AT THIS LEVEL?: 30 MIN

## 2024-02-02 ASSESSMENT — SOCIAL DETERMINANTS OF HEALTH (SDOH): HOW OFTEN DO YOU GET TOGETHER WITH FRIENDS OR RELATIVES?: ONCE A WEEK

## 2024-02-02 NOTE — NURSING NOTE
"49 year old  Chief Complaint   Patient presents with    Physical     Over past two months noticing joint pain. Notices mostly when exercising. Concerns about bones -- worried they aren't as strong as they used to be.       Blood pressure 113/78, pulse 79, temperature 97.2  F (36.2  C), temperature source Temporal, resp. rate 16, height 1.852 m (6' 0.91\"), weight 76.8 kg (169 lb 4 oz), SpO2 97%. Body mass index is 22.38 kg/m .  Patient Active Problem List   Diagnosis    Erectile dysfunction    Atopic dermatitis    Seasonal allergic rhinitis    SAMANTA (latent autoimmune diabetes mellitus in adults) (H)    Adjustment disorder with mixed anxiety and depressed mood    Upper back pain    Cervicalgia    Acute pain of right shoulder    Acute low back pain with radicular symptoms, duration less than 6 weeks    Chronic bilateral low back pain with left-sided sciatica    Bilateral low back pain without sciatica    Lateral epicondylitis of both elbows    Tension headache    Lumbar disc herniation       Wt Readings from Last 2 Encounters:   02/02/24 76.8 kg (169 lb 4 oz)   11/14/23 76.2 kg (168 lb)     BP Readings from Last 3 Encounters:   02/02/24 113/78   11/14/23 100/84   08/01/23 113/74         Current Outpatient Medications   Medication    ACCU-CHEK GUIDE test strip    ammonium lactate (AMLACTIN) 12 % external cream    blood glucose monitoring (ACCU-CHEK FASTCLIX) lancets    celecoxib (CELEBREX) 200 MG capsule    ciclopirox (LOPROX) 0.77 % cream    Continuous Blood Gluc Sensor (FREESTYLE NAVEED 2 SENSOR) MISC    DiphenhydrAMINE HCl (BENADRYL ALLERGY PO)    fluticasone (FLONASE) 50 MCG/ACT nasal spray    insulin aspart (NOVOLOG FLEXPEN) 100 UNIT/ML pen    insulin glargine (LANTUS SOLOSTAR) 100 UNIT/ML pen    insulin pen needle (BD MICH U/F) 32G X 4 MM miscellaneous    omeprazole (PRILOSEC) 20 MG DR capsule    polyethylene glycol (MIRALAX) powder    urea (GORMEL) 20 % external cream     Current Facility-Administered Medications " "  Medication    lidocaine (PF) (XYLOCAINE) 1 % injection 4 mL    triamcinolone (KENALOG-40) injection 40 mg       Social History     Tobacco Use    Smoking status: Never    Smokeless tobacco: Never   Vaping Use    Vaping Use: Never used   Substance Use Topics    Alcohol use: No    Drug use: No       Health Maintenance Due   Topic Date Due    DIABETIC FOOT EXAM  Never done    ADVANCE CARE PLANNING  Never done    Pneumococcal Vaccine: Pediatrics (0 to 5 Years) and At-Risk Patients (6 to 64 Years) (1 of 2 - PCV) Never done    COLORECTAL CANCER SCREENING  Never done    YEARLY PREVENTIVE VISIT  02/06/2014    BMP  09/17/2021    DTAP/TDAP/TD IMMUNIZATION (2 - Td or Tdap) 02/06/2023    COVID-19 Vaccine (4 - 2023-24 season) 09/01/2023       No results found for: \"PAP\"      February 2, 2024 2:19 PM    "

## 2024-02-02 NOTE — NURSING NOTE
Prior to immunization administration, verified patients identity using patient s name and date of birth. Please see Immunization Activity for additional information.     Screening Questionnaire for Adult Immunization    Are you sick today?   No   Do you have allergies to medications, food, a vaccine component or latex?   No   Have you ever had a serious reaction after receiving a vaccination?   No   Do you have a long-term health problem with heart, lung, kidney, or metabolic disease (e.g., diabetes), asthma, a blood disorder, no spleen, complement component deficiency, a cochlear implant, or a spinal fluid leak?  Are you on long-term aspirin therapy?   No   Do you have cancer, leukemia, HIV/AIDS, or any other immune system problem?   No   Do you have a parent, brother, or sister with an immune system problem?   No   In the past 3 months, have you taken medications that affect  your immune system, such as prednisone, other steroids, or anticancer drugs; drugs for the treatment of rheumatoid arthritis, Crohn s disease, or psoriasis; or have you had radiation treatments?   No   Have you had a seizure, or a brain or other nervous system problem?   No   During the past year, have you received a transfusion of blood or blood    products, or been given immune (gamma) globulin or antiviral drug?   No   For women: Are you pregnant or is there a chance you could become       pregnant during the next month?   No   Have you received any vaccinations in the past 4 weeks?   No     Immunization questionnaire answers were all negative.      Patient instructed to remain in clinic for 15 minutes afterwards, and to report any adverse reactions.     Screening performed by Raysa Do LPN on 2/2/2024 at 3:24 PM.

## 2024-02-02 NOTE — PATIENT INSTRUCTIONS
Calcium 1200mg daily   If supplement needed, use Calcium citrate    Vitamin D 1000 international unit(s) daily    Moisturize with CREAM, not lotions, in the winter  Eucarin, Vanicream    B complex vitamin for your nails

## 2024-02-03 LAB
ANION GAP SERPL CALCULATED.3IONS-SCNC: 8 MMOL/L (ref 7–15)
BUN SERPL-MCNC: 12.3 MG/DL (ref 6–20)
CALCIUM SERPL-MCNC: 9.4 MG/DL (ref 8.6–10)
CHLORIDE SERPL-SCNC: 103 MMOL/L (ref 98–107)
CREAT SERPL-MCNC: 0.82 MG/DL (ref 0.67–1.17)
DEPRECATED HCO3 PLAS-SCNC: 29 MMOL/L (ref 22–29)
EGFRCR SERPLBLD CKD-EPI 2021: >90 ML/MIN/1.73M2
GLUCOSE SERPL-MCNC: 148 MG/DL (ref 70–99)
POTASSIUM SERPL-SCNC: 4.3 MMOL/L (ref 3.4–5.3)
PSA SERPL DL<=0.01 NG/ML-MCNC: 0.68 NG/ML (ref 0–2.5)
SODIUM SERPL-SCNC: 140 MMOL/L (ref 135–145)
TSH SERPL DL<=0.005 MIU/L-ACNC: 2.22 UIU/ML (ref 0.3–4.2)
VIT D+METAB SERPL-MCNC: 21 NG/ML (ref 20–50)

## 2024-02-10 PROBLEM — F43.23 ADJUSTMENT DISORDER WITH MIXED ANXIETY AND DEPRESSED MOOD: Status: RESOLVED | Noted: 2020-09-20 | Resolved: 2024-02-10

## 2024-02-10 PROBLEM — M25.511 ACUTE PAIN OF RIGHT SHOULDER: Status: RESOLVED | Noted: 2021-09-30 | Resolved: 2024-02-10

## 2024-02-12 NOTE — PROGRESS NOTES
ASSESSMENT & PLAN    Piter was seen today for pain and pain.    Diagnoses and all orders for this visit:    Bilateral primary osteoarthritis of knee    Chronic pain of both knees  -     Orthopedic  Referral  -     XR Knee Bilateral 3 Views; Future  -     Physical Therapy Referral; Future      49-year-old male presents with bilateral knee pain for the past month with no inciting injury or event.  He points to primarily his lateral joint line and has a relatively unremarkable knee exam today with the exception of mild lateral joint line tenderness and some distal IT band tightness.  X-rays reveal mild patellofemoral osteoarthritis.  We discussed that since his pain is relatively new and not significantly impacting most of his activities, it is reasonable to start with conservative treatment such as physical therapy and a home exercise program.  Discussed that his therapist can do a more thorough evaluation for muscle imbalances and help provide him with a safe and effective home exercise program, as his current one seems to be exacerbating his symptoms.    Plan:  -Start PT and home exercise program   -Can continue Celebrex for pain, or can try Tylenol Extra Strength (1000mg) up to three times daily as needed for pain  -If no improvement, could consider additional imaging of the knees, braces, or injections in the future     Return in about 3 months (around 5/13/2024).      Dr. Violet Lucero DO, CAQ  [facility]  Sports Medicine     -----  Chief Complaint   Patient presents with    Left Knee - Pain    Right Knee - Pain       SUBJECTIVE  Piter Conway is a/an 49 year old male who is seen in consultation at the request of  Rosamaria Banerjee M.D. for evaluation of bilateral knee pain.  Onset was over 1 month ago, without injury. Pain is located over anterior/lateral knee joints. Symptoms are worsened by squatting and prolonged sitting.  He has tried rest and celebrex. Associated  "symptoms include tightness and \"needle\" like pain if exacerbated.  Also reports history of multiple joint and muscle pain including neck/back/elbows as well as chronic lower back pain.  Denies joint swelling or family or personal history of autoimmune disease.  Denies morning stiffness, joint redness, fever/chills.  Reports that his pain has been getting better and he does not seem to have any pain at rest.     The patient is seen alone    Prior injury/Surgical history of affected joint: No, but patient endorses Lumbar disc injury 2021.   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     REVIEW OF SYSTEMS:  Pertinent positives/negative: As stated above in HPI    OBJECTIVE:  /82    General: Alert and in no distress  Skin: no visable rashes  CV: Extremities appear well perfused   Resp: normal respiratory effort, no conversational dyspnea   Psych: normal mood, affect  MSK:  BILATERAL KNEE  Inspection:    Normal alignment; no edema, erythema, or ecchymosis present  Palpation:    Tender about the lateral joint line and distal IT band. Remainder of bony and ligamentous landmarks are nontender.    No effusion is present    Patellofemoral crepitus is Absent  Range of Motion:     00 extension to 1350 flexion bilaterally with no pain at end range  Strength:    Extensor mechanism intact bilaterally  Special Tests:    Positive: None    Negative: Patellar grind, Valgus stress (0 and 30), Varus stress (0 and 30), Lachman, and Garrett        RADIOLOGY:  Final results and radiologist's interpretation available in the [facility] health record.  Images below were personally reviewed and discussed with the patient in the office today.  My personal interpretation of the performed imaging: X-rays of the bilateral knees reveal mild patellofemoral degenerative changes      Review of prior external note(s) from - primary care 2/2/2024         "

## 2024-02-13 ENCOUNTER — ANCILLARY PROCEDURE (OUTPATIENT)
Dept: GENERAL RADIOLOGY | Facility: CLINIC | Age: 49
End: 2024-02-13
Attending: STUDENT IN AN ORGANIZED HEALTH CARE EDUCATION/TRAINING PROGRAM
Payer: COMMERCIAL

## 2024-02-13 ENCOUNTER — OFFICE VISIT (OUTPATIENT)
Dept: ORTHOPEDICS | Facility: CLINIC | Age: 49
End: 2024-02-13
Attending: INTERNAL MEDICINE
Payer: COMMERCIAL

## 2024-02-13 VITALS — DIASTOLIC BLOOD PRESSURE: 82 MMHG | SYSTOLIC BLOOD PRESSURE: 124 MMHG

## 2024-02-13 DIAGNOSIS — M25.561 CHRONIC PAIN OF BOTH KNEES: ICD-10-CM

## 2024-02-13 DIAGNOSIS — G89.29 CHRONIC PAIN OF BOTH KNEES: ICD-10-CM

## 2024-02-13 DIAGNOSIS — M25.562 CHRONIC PAIN OF BOTH KNEES: ICD-10-CM

## 2024-02-13 DIAGNOSIS — M17.0 BILATERAL PRIMARY OSTEOARTHRITIS OF KNEE: Primary | ICD-10-CM

## 2024-02-13 PROCEDURE — 73562 X-RAY EXAM OF KNEE 3: CPT | Mod: TC | Performed by: RADIOLOGY

## 2024-02-13 PROCEDURE — 99213 OFFICE O/P EST LOW 20 MIN: CPT | Performed by: STUDENT IN AN ORGANIZED HEALTH CARE EDUCATION/TRAINING PROGRAM

## 2024-02-13 NOTE — PATIENT INSTRUCTIONS
"Plan:  -Start PT and home exercise program   -Can continue Celebrex for pain, or can try Tylenol Extra Strength (1000mg) up to three times daily as needed for pain  -Could consider braces or injections in the future if no improvement in pain     Thank you for choosing [facility]!    DR. LUCERO'S CLINIC LOCATIONS:     [place]  TRIAGE LINE: [phone] [address] APPOINTMENTS: [phone]   [place] [address] RADIOLOGY: [phone]   (Mondays & Tuesdays) HAND THERAPY: [phone]    PHYSICAL THERAPY: [phone]   [place] BILLING QUESTIONS: [phone] [address] FAX: [phone]   [place] [address]    (Thursdays & Fridays)      If you have any questions or concerns after your appointment, you can send Dr. Lucero a Tizaro message or call the clinic number at [phone] at any time.    Violet Lucero DO, CAM  [facility]  Sports Medicine     Non-Operative treatment of Knee Osteoarthritis    To Decrease Stress  Stay fit and get regular exercise    Muscle Strengthening: Consider physical therapy  Activity Modification: Avoid high-impact activities  Consider using walking poles/cane or a knee brace to offload knee    To Decrease Pain  Tylenol (Acetaminophen) as needed 2 tablets (1,000 mg) three times per day, not to exceed 3,000 mg per day   Trial topical Voltaren (Diclofenac) gel up to 4x daily to area of pain  Glucosamine chondroiten (try taking for 3 months and if helpful, continue)  Steroid injections (no sooner than every 3 months)  Viscosupplementation/\"gel\" injections (Synvisc, Euflexxa, etc. are brand names)  Platelet Rich Plasma (Not covered by insurance)   "

## 2024-02-13 NOTE — LETTER
2/13/2024         RE: Piter Conway  [address] [place] [id number]        Dear Colleague,    Thank you for referring your patient, Piter Conway, to the [facility]. Please see a copy of my visit note below.    ASSESSMENT & PLAN    Piter was seen today for pain and pain.    Diagnoses and all orders for this visit:    Bilateral primary osteoarthritis of knee    Chronic pain of both knees  -     Orthopedic  Referral  -     XR Knee Bilateral 3 Views; Future  -     Physical Therapy Referral; Future      49-year-old male presents with bilateral knee pain for the past month with no inciting injury or event.  He points to primarily his lateral joint line and has a relatively unremarkable knee exam today with the exception of mild lateral joint line tenderness and some distal IT band tightness.  X-rays reveal mild patellofemoral osteoarthritis.  We discussed that since his pain is relatively new and not significantly impacting most of his activities, it is reasonable to start with conservative treatment such as physical therapy and a home exercise program.  Discussed that his therapist can do a more thorough evaluation for muscle imbalances and help provide him with a safe and effective home exercise program, as his current one seems to be exacerbating his symptoms.    Plan:  -Start PT and home exercise program   -Can continue Celebrex for pain, or can try Tylenol Extra Strength (1000mg) up to three times daily as needed for pain  -If no improvement, could consider additional imaging of the knees, braces, or injections in the future     Return in about 3 months (around 5/13/2024).      Dr. Violet Lucero, DO, CAQ  [facility]  Sports Medicine     -----  Chief Complaint   Patient presents with     Left Knee - Pain     Right Knee - Pain       SUBJECTIVE  Piter Conway is a/an 49 year old male who is seen in consultation at the  "request of  Rosamaria Banerjee M.D. for evaluation of bilateral knee pain.  Onset was over 1 month ago, without injury. Pain is located over anterior/lateral knee joints. Symptoms are worsened by squatting and prolonged sitting.  He has tried rest and celebrex. Associated symptoms include tightness and \"needle\" like pain if exacerbated.  Also reports history of multiple joint and muscle pain including neck/back/elbows as well as chronic lower back pain.  Denies joint swelling or family or personal history of autoimmune disease.  Denies morning stiffness, joint redness, fever/chills.  Reports that his pain has been getting better and he does not seem to have any pain at rest.     The patient is seen alone    Prior injury/Surgical history of affected joint: No, but patient endorses Lumbar disc injury 2021.   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     REVIEW OF SYSTEMS:  Pertinent positives/negative: As stated above in HPI    OBJECTIVE:  /82    General: Alert and in no distress  Skin: no visable rashes  CV: Extremities appear well perfused   Resp: normal respiratory effort, no conversational dyspnea   Psych: normal mood, affect  MSK:  BILATERAL KNEE  Inspection:    Normal alignment; no edema, erythema, or ecchymosis present  Palpation:    Tender about the lateral joint line and distal IT band. Remainder of bony and ligamentous landmarks are nontender.    No effusion is present    Patellofemoral crepitus is Absent  Range of Motion:     00 extension to 1350 flexion bilaterally with no pain at end range  Strength:    Extensor mechanism intact bilaterally  Special Tests:    Positive: None    Negative: Patellar grind, Valgus stress (0 and 30), Varus stress (0 and 30), Lachman, and Garrett        RADIOLOGY:  Final results and radiologist's interpretation available in the [facility] health record.  Images below were personally reviewed and discussed with the patient in the office today.  My personal interpretation " of the performed imaging: X-rays of the bilateral knees reveal mild patellofemoral degenerative changes      Review of prior external note(s) from - primary care 2/2/2024             Again, thank you for allowing me to participate in the care of your patient.        Sincerely,        Violet Lucero, DO

## 2024-02-28 ASSESSMENT — ACTIVITIES OF DAILY LIVING (ADL)
WEAKNESS: I DO NOT HAVE THE SYMPTOM
AS_A_RESULT_OF_YOUR_KNEE_INJURY,_HOW_WOULD_YOU_RATE_YOUR_CURRENT_LEVEL_OF_DAILY_ACTIVITY?: NEARLY NORMAL
SWELLING: I DO NOT HAVE THE SYMPTOM
KNEEL ON THE FRONT OF YOUR KNEE: ACTIVITY IS MINIMALLY DIFFICULT
PLEASE_INDICATE_YOR_PRIMARY_REASON_FOR_REFERRAL_TO_THERAPY:: KNEE
STIFFNESS: THE SYMPTOM AFFECTS MY ACTIVITY SLIGHTLY
SQUAT: ACTIVITY IS MINIMALLY DIFFICULT
STAND: ACTIVITY IS MINIMALLY DIFFICULT
GO UP STAIRS: ACTIVITY IS NOT DIFFICULT
HOW_WOULD_YOU_RATE_THE_CURRENT_FUNCTION_OF_YOUR_KNEE_DURING_YOUR_USUAL_DAILY_ACTIVITIES_ON_A_SCALE_FROM_0_TO_100_WITH_100_BEING_YOUR_LEVEL_OF_KNEE_FUNCTION_PRIOR_TO_YOUR_INJURY_AND_0_BEING_THE_INABILITY_TO_PERFORM_ANY_OF_YOUR_USUAL_DAILY_ACTIVITIES?: 75
GIVING WAY, BUCKLING OR SHIFTING OF KNEE: I HAVE THE SYMPTOM BUT IT DOES NOT AFFECT MY ACTIVITY
WALK: ACTIVITY IS MINIMALLY DIFFICULT
SIT WITH YOUR KNEE BENT: ACTIVITY IS NOT DIFFICULT
GO DOWN STAIRS: ACTIVITY IS NOT DIFFICULT
RISE FROM A CHAIR: ACTIVITY IS NOT DIFFICULT
LIMPING: I HAVE THE SYMPTOM BUT IT DOES NOT AFFECT MY ACTIVITY
HOW_WOULD_YOU_RATE_THE_OVERALL_FUNCTION_OF_YOUR_KNEE_DURING_YOUR_USUAL_DAILY_ACTIVITIES?: NEARLY NORMAL
PAIN: THE SYMPTOM AFFECTS MY ACTIVITY SLIGHTLY

## 2024-02-29 ENCOUNTER — TELEPHONE (OUTPATIENT)
Dept: GASTROENTEROLOGY | Facility: CLINIC | Age: 49
End: 2024-02-29

## 2024-02-29 ENCOUNTER — THERAPY VISIT (OUTPATIENT)
Dept: PHYSICAL THERAPY | Facility: REHABILITATION | Age: 49
End: 2024-02-29
Attending: STUDENT IN AN ORGANIZED HEALTH CARE EDUCATION/TRAINING PROGRAM
Payer: COMMERCIAL

## 2024-02-29 DIAGNOSIS — Z12.11 SPECIAL SCREENING FOR MALIGNANT NEOPLASMS, COLON: Primary | ICD-10-CM

## 2024-02-29 DIAGNOSIS — G89.29 CHRONIC PAIN OF BOTH KNEES: Primary | ICD-10-CM

## 2024-02-29 DIAGNOSIS — M25.562 CHRONIC PAIN OF BOTH KNEES: Primary | ICD-10-CM

## 2024-02-29 DIAGNOSIS — M25.561 CHRONIC PAIN OF BOTH KNEES: Primary | ICD-10-CM

## 2024-02-29 PROCEDURE — 97110 THERAPEUTIC EXERCISES: CPT | Mod: GP

## 2024-02-29 PROCEDURE — 97161 PT EVAL LOW COMPLEX 20 MIN: CPT | Mod: GP

## 2024-02-29 NOTE — TELEPHONE ENCOUNTER
"Endoscopy Scheduling Screen    Have you had a positive Covid test in the last 14 days?  No    Are you active on MyChart?   Yes    What insurance is in the chart?  Other:  [facility]    Ordering/Referring Provider:     JENNI OKEEFE      (If ordering provider performs procedure, schedule with ordering provider unless otherwise instructed. )    BMI: Estimated body mass index is 22.38 kg/m  as calculated from the following:    Height as of 2/2/24: 1.852 m (6' 0.91\").    Weight as of 2/2/24: 76.8 kg (169 lb 4 oz).     Sedation Ordered  moderate sedation.   If patient BMI > 50 do not schedule in [facility].    If patient BMI > 45 do not schedule at [facility].    Are you taking methadone or Suboxone?  No    Have you had difficulties, pain, or discomfort during past endoscopy procedures?  No    Are you taking any prescription medications for pain 3 or more times per week?   NO - No RN review required.    Do you have a history of malignant hyperthermia or adverse reaction to anesthesia?  No    (Females) Are you currently pregnant?   No     Have you been diagnosed or told you have pulmonary hypertension?   No    Do you have an LVAD?  No    Have you been told you have moderate to severe sleep apnea?  No    Have you been told you have COPD, asthma, or any other lung disease?  No    Do you have any heart conditions?  No     Have you ever had an organ transplant?   No    Have you ever had or are you awaiting a heart or lung transplant?   No    Have you had a stroke or transient ischemic attack (TIA aka \"mini stroke\" in the last 6 months?   No    Have you been diagnosed with or been told you have cirrhosis of the liver?   No    Are you currently on dialysis?   No    Do you need assistance transferring?   No    BMI: Estimated body mass index is 22.38 kg/m  as calculated from the following:    Height as of 2/2/24: 1.852 m (6' 0.91\").    Weight as of 2/2/24: 76.8 kg (169 lb 4 oz).     Is patients BMI > 40 and scheduling location " [facility]?  No    Do you take an injectable medication for weight loss or diabetes (excluding insulin)?  No    Do you take the medication Naltrexone?  No    Do you take blood thinners?  No       Prep   Are you currently on dialysis or do you have chronic kidney disease?  No    Do you have a diagnosis of diabetes?  Yes (Golytely Prep)    Do you have a diagnosis of cystic fibrosis (CF)?  No    On a regular basis do you go 3 -5 days between bowel movements?  Yes (Extended Prep)    BMI > 40?  No    Preferred Pharmacy:    [place], [place] - [address]  [address]  [facility] [address]  [facility] [address]  Phone: [phone] Fax: [phone] Alternate Fax: [phone], [phone], [phone]    Final Scheduling Details   Colonoscopy prep sent?  N/A    Procedure scheduled  Colonoscopy    Surgeon:  COTY    Date of procedure:  04/10/2024    Pre-OP / PAC:   No - Not required for this site.    Location  [facility]  NEXT AVAILABLE    Sedation   MAC/Deep Sedation  NEXT AVAILABLE      Patient Reminders:   You will receive a call from a Nurse to review instructions and health history.  This assessment must be completed prior to your procedure.  Failure to complete the Nurse assessment may result in the procedure being cancelled.      On the day of your procedure, please designate an adult(s) who can drive you home stay with you for the next 24 hours. The medicines used in the exam will make you sleepy. You will not be able to drive.      You cannot take public transportation, ride share services, or non-medical taxi service without a responsible caregiver.  Medical transport services are allowed with the requirement that a responsible caregiver will receive you at your destination.  We require that drivers and caregivers are confirmed prior to your procedure.

## 2024-02-29 NOTE — PROGRESS NOTES
PHYSICAL THERAPY EVALUATION  Type of Visit: Evaluation    See electronic medical record for Abuse and Falls Screening details.    Subjective       Piter reports that 1-2 months ago he started to develop bilateral anterolateral knee pain, especially after exercising. He found a lot of relief with over-the-counter pain medications and heat. However, more recently his pain has become more consistent. He also reports feeling weak/unsteady when walking outside. He specifically mentions squats as an aggravating factor. He had imaging which showed mild arthritis bilaterally. Worst pain: 7-8/10. Best pain: 2-3/10. Current pain: 2-3/10. He reports occasional right-sided big toe numbness that has fluctuated for multiple years now. He reports that his symptoms have not really changed recently.  Presenting condition or subjective complaint: Knee pain  Date of onset: 01/15/24    Relevant medical history: Diabetes   Dates & types of surgery: None    Prior diagnostic imaging/testing results: X-ray     Prior therapy history for the same diagnosis, illness or injury: No      Prior Level of Function  Transfers: Independent  Ambulation: Independent  ADL: Independent  IADL:  Independent    Living Environment  Social support: With a significant other or spouse   Type of home: Apartment/condo   Stairs to enter the home: No       Ramp: No   Stairs inside the home: No       Help at home: None  Equipment owned:       Employment: Yes   Hobbies/Interests: Walking outdoors    Patient goals for therapy: My activity    Pain assessment:  see subjective report     Objective   KNEE EVALUATION  PAIN: see subjective report  INTEGUMENTARY (edema, incisions):   POSTURE:   GAIT:  Weightbearing Status:   Assistive Device(s):   Gait Deviations:   BALANCE/PROPRIOCEPTION:   WEIGHTBEARING ALIGNMENT:   NON-WEIGHTBEARING ALIGNMENT:   ROM: AROM WNL    STRENGTH:   - Bilateral hip abduction/adduction/flexion and knee flexion/extension: 5/5 (mild pain  bilaterally with knee flexion)  - Hip extension: right 5/5, left 4+/5  FLEXIBILITY: Decreased piriformis L, Decreased hamstrings L, Decreased piriformis R, Decreased hamstrings R, quad WNL bilaterally  SPECIAL TESTS:    Left Right   Apley's (Meniscus)     Garrett's (Meniscus) Negative  Negative    Torrey's (ITB/TFL)     Patellar Apprehension Test     Patella Tracking     Ligamentous Stability     Anterior Drawer (ACL) Negative,   Negative,     Posterior Drawer (PCL) Negative,   Negative,     Prone Dial Test at 30 Deg and 90 Deg (PCL/PLC)     Valgus Stress Testing at 0 Deg and 30 Deg Negative,   Negative,     Varus Stress Testing at 0 Deg and 30 Deg  Negative,   Negative,     - No apparent up-slip/down-slip, rotation, or leg length discrepancy  FUNCTIONAL TESTS: Double Leg Squat: Anterior knee translation and Able to perform full deep squat with pain  Single Leg Squat: Anterior knee translation, Knee valgus, Hip internal rotation, Increased trunk lean, Improper use of glutes/hips, and deviations more noted on left than right  PALPATION:  No joint line tenderness bilaterally. General lateral knee tender bilaterally with no specific structure involvement. IT bands tender bilaterally. Mild crepitus noted bilaterally.  JOINT MOBILITY:     Assessment & Plan   CLINICAL IMPRESSIONS  Medical Diagnosis: Chronic pain of both knees    Treatment Diagnosis: Bilateral knee pain with strength deficits   Impression/Assessment: Patient is a 49 year old male with bilateral knee pain complaints.  The following significant findings have been identified: Pain, Decreased strength, Impaired muscle performance, and Decreased activity tolerance. These impairments interfere with their ability to perform recreational activities and community mobility as compared to previous level of function.     Clinical Decision Making (Complexity):  Clinical Presentation: Stable/Uncomplicated  Clinical Presentation Rationale: based on medical and personal  factors listed in PT evaluation  Clinical Decision Making (Complexity): Low complexity    PLAN OF CARE  Treatment Interventions:  Modalities: Cryotherapy, E-stim, Hot Pack  Interventions: Manual Therapy, Neuromuscular Re-education, Therapeutic Activity, Therapeutic Exercise, Self-Care/Home Management    Long Term Goals     PT Goal 1  Goal Identifier: HEP  Goal Description: Piter will demonstrate mastery of ([id number] need for cueing) and compliance with (completion on at least 5/7 days per week) his home exercise program to facilitate increased rate of improvement.  Goal Progress: In progress  Target Date: 03/28/24  PT Goal 2  Goal Identifier: KOS  Goal Description: Piter will demonstrate significantly improved function as evidenced by an improved KOS score of at least 95.71%.  Goal Progress: 85.71%  Target Date: 04/25/24  PT Goal 3  Goal Identifier: Exercise  Goal Description: Piter will demonstrate improved [place] to recreational activities as evidenced by his ability to participate in all desired exercise(s) without limitation and with self-report knee pain no higher than 2/10.  Goal Progress: Unable  Target Date: 04/25/24      Frequency of Treatment: 1 time per week  Duration of Treatment: 8 weeks    Recommended Referrals to Other Professionals:  none  Education Assessment:   Learner/Method: Patient;Listening;Demonstration;Pictures/Video;No Barriers to Learning    Risks and benefits of evaluation/treatment have been explained.   Patient/Family/caregiver agrees with Plan of Care.     Evaluation Time:     PT Eval, Low Complexity Minutes (07446): 22       Signing Clinician: Bob Aguayo, PT      [facility]                                                                                   OUTPATIENT PHYSICAL THERAPY      PLAN OF TREATMENT FOR OUTPATIENT REHABILITATION   Patient's Last Name, First Name, ANABELLE ConwayPiterkimberli CHENG YOB: 1975   Provider's Name   [facility]  [facility]   Medical Record No.  [id number]     Onset Date: 01/15/24  Start of Care Date: 02/29/24     Medical Diagnosis:  Chronic pain of both knees      PT Treatment Diagnosis:  Bilateral knee pain with strength deficits Plan of Treatment  Frequency/Duration: 1 time per week/ 8 weeks    Certification date from 02/29/24 to 04/25/24         See note for plan of treatment details and functional goals     Bob Aguayo, PT                         I CERTIFY THE NEED FOR THESE SERVICES FURNISHED UNDER        THIS PLAN OF TREATMENT AND WHILE UNDER MY CARE     (Physician attestation of this document indicates review and certification of the therapy plan).              Referring Provider:  Violet Lucero    Initial Assessment  See Epic Evaluation- Start of Care Date: 02/29/24

## 2024-03-01 ENCOUNTER — MYC MEDICAL ADVICE (OUTPATIENT)
Dept: GASTROENTEROLOGY | Facility: CLINIC | Age: 49
End: 2024-03-01
Payer: COMMERCIAL

## 2024-03-01 ENCOUNTER — HOSPITAL ENCOUNTER (OUTPATIENT)
Facility: CLINIC | Age: 49
End: 2024-03-01
Attending: COLON & RECTAL SURGERY | Admitting: COLON & RECTAL SURGERY
Payer: COMMERCIAL

## 2024-03-01 NOTE — TELEPHONE ENCOUNTER
Reason for Reschedule/Cancellation   (please be detailed, any staff messages or encounters to note?): COTY GREENFIELD      Prior to reschedule please review:  Ordering Provider: PINGEL  Sedation Determined: MODERATE  Does patient have any ASC Exclusions, please identify?: N      Notes on Cancelled Procedure:  Procedure: Lower Endoscopy [Colonoscopy]   Date: 04/10/2024  Location: [facility]; [address] [place] [address] [address], [place] [address]  Surgeon: COTY      Rescheduled: No, LVM TO CALL BACK, SENT [employer] .       Did you cancel or rescheduled an EUS procedure? No.

## 2024-03-01 NOTE — TELEPHONE ENCOUNTER
Caller: patient    Reason for Reschedule/Cancellation   (please be detailed, any staff messages or encounters to note?): provider out      Prior to reschedule please review:  Ordering Provider:  JENNI OKEEFE  Sedation Determined: moderate  Does patient have any ASC Exclusions, please identify?: no      Notes on Cancelled Procedure:  Procedure: Lower Endoscopy [Colonoscopy]   Date: 4/10  Location: [facility]; [address] [facility] [address] [facility], [place] [address]  Surgeon: toby      Rescheduled: Yes,   Procedure: Lower Endoscopy [Colonoscopy]    Date: 4/23   Location: [facility]; [address] [place] [address]    Surgeon: maki   Sedation Level Scheduled  moderate,  Reason for Sedation Level per order   Instructions updated and sent: letha

## 2024-03-06 ENCOUNTER — THERAPY VISIT (OUTPATIENT)
Dept: PHYSICAL THERAPY | Facility: REHABILITATION | Age: 49
End: 2024-03-06
Payer: COMMERCIAL

## 2024-03-06 DIAGNOSIS — G89.29 CHRONIC PAIN OF BOTH KNEES: Primary | ICD-10-CM

## 2024-03-06 DIAGNOSIS — M25.562 CHRONIC PAIN OF BOTH KNEES: Primary | ICD-10-CM

## 2024-03-06 DIAGNOSIS — M25.561 CHRONIC PAIN OF BOTH KNEES: Primary | ICD-10-CM

## 2024-03-06 PROCEDURE — 97110 THERAPEUTIC EXERCISES: CPT | Mod: GP

## 2024-03-14 ENCOUNTER — OFFICE VISIT (OUTPATIENT)
Dept: ENDOCRINOLOGY | Facility: CLINIC | Age: 49
End: 2024-03-14
Payer: COMMERCIAL

## 2024-03-14 DIAGNOSIS — E10.42 DM TYPE 1 WITH DIABETIC PERIPHERAL NEUROPATHY (H): Primary | ICD-10-CM

## 2024-03-14 PROCEDURE — 99213 OFFICE O/P EST LOW 20 MIN: CPT | Performed by: PODIATRIST

## 2024-03-14 NOTE — PROGRESS NOTES
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)    Upper back pain    Cervicalgia    Acute low back pain with radicular symptoms, duration less than 6 weeks    Chronic bilateral low back pain with left-sided sciatica    Bilateral low back pain without sciatica    Lateral epicondylitis of both elbows    Tension headache    Lumbar disc herniation    Chronic pain of both knees     Past Surgical History:   Procedure Laterality Date    ESOPHAGOSCOPY, GASTROSCOPY, DUODENOSCOPY (EGD), COMBINED N/A 12/1/2020    Procedure: ESOPHAGOGASTRODUODENOSCOPY, WITH BIOPSY;  Surgeon: Fabio Mota DO;  [place] [facility] [place]    ESOPHAGOSCOPY, GASTROSCOPY, DUODENOSCOPY (EGD), COMBINED N/A 1/3/2023    Procedure: ESOPHAGOGASTRODUODENOSCOPY (EGD);  Surgeon: Sarah Stafford MD;  [place] [facility] [place]    NO HISTORY OF SURGERY       Social History     Socioeconomic History    Marital status:      Spouse name: Not on file    Number of children: 3    Years of education: Not on file    Highest education level: Not on file   Occupational History    Occupation: medical transport   Tobacco Use    Smoking status: Never    Smokeless tobacco: Never   Vaping Use    Vaping Use: Never used   Substance and Sexual Activity    Alcohol use: No    Drug use: No    Sexual activity: Yes     Partners: Female     Comment:    Other Topics Concern    Parent/sibling w/ CABG, MI or angioplasty before 65F 55M? Not Asked   Social History Narrative    Not on file     Social Determinants of Health     Financial Resource Strain: Low Risk  (2/2/2024)    Financial Resource Strain     Within the past 12 months, have you or your family members you live with been unable to get utilities (heat, electricity) when it was really needed?: No    Food Insecurity: Low Risk  (2/2/2024)    Food Insecurity     Within the past 12 months, did you worry that your food would run out before you got money to buy more?: No     Within the past 12 months, did the food you bought just not last and you didn t have money to get more?: No   Transportation Needs: Low Risk  (2/2/2024)    Transportation Needs     Within the past 12 months, has lack of transportation kept you from medical appointments, getting your medicines, non-medical meetings or appointments, work, or from getting things that you need?: No   Physical Activity: Insufficiently Active (2/2/2024)    Exercise Vital Sign     Days of Exercise per Week: 3 days     Minutes of Exercise per Session: 30 min   Stress: No Stress Concern Present (2/2/2024)    [employer] [place] [employer] - Occupational Stress Questionnaire     Feeling of Stress : Only a little   Social Connections: Unknown (2/2/2024)    Social Connection and Isolation Panel [NHANES]     Frequency of Communication with Friends and Family: Not on file     Frequency of Social Gatherings with Friends and Family: Once a week     Attends Mu-ism Services: Not on file     Active Member of Clubs or Organizations: Not on file     Attends Club or Organization Meetings: Not on file     Marital Status: Not on file   Interpersonal Safety: Low Risk  (2/2/2024)    Interpersonal Safety     Do you feel physically and emotionally safe where you currently live?: Yes     Within the past 12 months, have you been hit, slapped, kicked or otherwise physically hurt by someone?: No     Within the past 12 months, have you been humiliated or emotionally abused in other ways by your partner or ex-partner?: No   Housing Stability: Low Risk  (2/2/2024)    Housing Stability     Do you have housing? : Yes     Are you worried about losing your housing?: No     Family History   Problem Relation Age of Onset    Osteoporosis Mother     Diabetes Mother     Hypertension Mother      Hypertension Father     Diabetes Father     Heart Disease Father 75    Heart Failure Father     Aortic stenosis Father     Other - See Comments Father         enlarged prostate?    Diabetes Brother     Other - See Comments Brother         mental health    Hearing Loss Son         cochlear implant    Autism Spectrum Disorder Son     Glaucoma No family hx of     Macular Degeneration No family hx of          Lab Results   Component Value Date    A1C 6.8 02/02/2024    A1C 7.1 09/27/2023    A1C 7.4 03/27/2023    A1C 7.9 09/21/2022    A1C 6.7 02/25/2022    A1C 7.3 06/30/2021    A1C 7.2 11/28/2020    A1C 7.6 09/17/2020    A1C 6.9 03/04/2019    A1C 6.3 12/11/2017       Last Comprehensive Metabolic Panel:  Lab Results   Component Value Date     02/02/2024    POTASSIUM 4.3 02/02/2024    CHLORIDE 103 02/02/2024    CO2 29 02/02/2024    ANIONGAP 8 02/02/2024     (H) 02/02/2024    BUN 12.3 02/02/2024    CR 0.82 02/02/2024    GFRESTIMATED >90 02/02/2024    [place] 9.4 02/02/2024         Subjective findings- 49-year-old returns clinic for Diabetic foot cares.  Relates he is doing well, relates to no ulcers or sores or injuries since we seen him last, relates he has occasional tingling in his heels, relates to using the Loprox cream and that is worked well and he uses that and interchanges it with moisturizing lotion at Times.    Objective findings- DP and PT are 2 out of 4 bilaterally.  There is no erythema, no drainage, no odor, no calor, no pain on palpation, no gross tendon voids bilaterally.  Deep tendon reflexes are intact bilaterally, Sharp/dull is intact with 5.07 Symes Malissa monofilament bilaterally, proprioception is intact bilaterally.  Has functional hallux limitus bilaterally.    Assessment and plan- Diabetes with intermittent Neuropathy symptoms, this is improved and his protective sensations intact, Tinea Pedis is resolved.  His previous calluses are resolved.  Diagnosis and treatment options discussed  with them.  Previous notes reviewed.  Continue Loprox cream as needed.  Return to clinic and see me in 6 months.                    Low to moderate level of medical decision making.

## 2024-03-14 NOTE — LETTER
3/14/2024       RE: Piter CHENG Said  [address] [place] [address] [place] [id number]     Dear Colleague,    Thank you for referring your patient, Piter CHENG Said, to the [facility] ENDOCR[facility]OLOG[facility] [place] at [facility]. Please see a copy of my visit note below.    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 OR    ESOPHAGOSCOPY, GASTROSCOPY, DUODENOSCOPY (EGD), COMBINED N/A 1/3/2023    Procedure: ESOPHAGOGASTRODUODENOSCOPY (EGD);  Surgeon: Sarah Stafford MD;  Location: [facility] OR    NO HISTORY OF SURGERY       Social History     Socioeconomic History    Marital status:      Spouse name: Not on file    Number of children: 3    Years of education: Not on file    Highest education level: Not on file   Occupational History    Occupation: medical transport   Tobacco Use    Smoking status: Never    Smokeless tobacco: Never   Vaping Use    Vaping Use: Never used   Substance and Sexual Activity    Alcohol use: No    Drug use: No    Sexual activity: Yes     Partners: Female     Comment:    Other Topics Concern    Parent/sibling w/ CABG, MI or angioplasty before 65F 55M? Not Asked    Social History Narrative    Not on file     Social Determinants of Health     Financial Resource Strain: Low Risk  (2/2/2024)    Financial Resource Strain     Within the past 12 months, have you or your family members you live with been unable to get utilities (heat, electricity) when it was really needed?: No   Food Insecurity: Low Risk  (2/2/2024)    Food Insecurity     Within the past 12 months, did you worry that your food would run out before you got money to buy more?: No     Within the past 12 months, did the food you bought just not last and you didn t have money to get more?: No   Transportation Needs: Low Risk  (2/2/2024)    Transportation Needs     Within the past 12 months, has lack of transportation kept you from medical appointments, getting your medicines, non-medical meetings or appointments, work, or from getting things that you need?: No   Physical Activity: Insufficiently Active (2/2/2024)    Exercise Vital Sign     Days of Exercise per Week: 3 days     Minutes of Exercise per Session: 30 min   Stress: No Stress Concern Present (2/2/2024)    Citizen of [place] of Occupational Health - Occupational Stress Questionnaire     Feeling of Stress : Only a little   Social Connections: Unknown (2/2/2024)    Social Connection and Isolation Panel [NHANES]     Frequency of Communication with Friends and Family: Not on file     Frequency of Social Gatherings with Friends and Family: Once a week     Attends Druze Services: Not on file     Active Member of Clubs or Organizations: Not on file     Attends Club or Organization Meetings: Not on file     Marital Status: Not on file   Interpersonal Safety: Low Risk  (2/2/2024)    Interpersonal Safety     Do you feel physically and emotionally safe where you currently live?: Yes     Within the past 12 months, have you been hit, slapped, kicked or otherwise physically hurt by someone?: No     Within the past 12 months, have you been humiliated or emotionally abused  in other ways by your partner or ex-partner?: No   Housing Stability: Low Risk  (2/2/2024)    Housing Stability     Do you have housing? : Yes     Are you worried about losing your housing?: No     Family History   Problem Relation Age of Onset    Osteoporosis Mother     Diabetes Mother     Hypertension Mother     Hypertension Father     Diabetes Father     Heart Disease Father 75    Heart Failure Father     Aortic stenosis Father     Other - See Comments Father         enlarged prostate?    Diabetes Brother     Other - See Comments Brother         mental health    Hearing Loss Son         cochlear implant    Autism Spectrum Disorder Son     Glaucoma No family hx of     Macular Degeneration No family hx of          Lab Results   Component Value Date    A1C 6.8 02/02/2024    A1C 7.1 09/27/2023    A1C 7.4 03/27/2023    A1C 7.9 09/21/2022    A1C 6.7 02/25/2022    A1C 7.3 06/30/2021    A1C 7.2 11/28/2020    A1C 7.6 09/17/2020    A1C 6.9 03/04/2019    A1C 6.3 12/11/2017       Last Comprehensive Metabolic Panel:  Lab Results   Component Value Date     02/02/2024    POTASSIUM 4.3 02/02/2024    CHLORIDE 103 02/02/2024    CO2 29 02/02/2024    ANIONGAP 8 02/02/2024     (H) 02/02/2024    BUN 12.3 02/02/2024    CR 0.82 02/02/2024    GFRESTIMATED >90 02/02/2024    [place] 9.4 02/02/2024       Subjective findings- 49-year-old returns clinic for Diabetic foot cares.  Relates he is doing well, relates to no ulcers or sores or injuries since we seen him last, relates he has occasional tingling in his heels, relates to using the Loprox cream and that is worked well and he uses that and interchanges it with moisturizing lotion at [facility].    Objective findings- DP and PT are 2 out of 4 bilaterally.  There is no erythema, no drainage, no odor, no calor, no pain on palpation, no gross tendon voids bilaterally.  Deep tendon reflexes are intact bilaterally, Sharp/dull is intact with 5.07 Symes Malissa monofilament bilaterally,  proprioception is intact bilaterally.  Has functional hallux limitus bilaterally.    Assessment and plan- Diabetes with intermittent Neuropathy symptoms, this is improved and his protective sensations intact, Tinea Pedis is resolved.  His previous calluses are resolved.  Diagnosis and treatment options discussed with them.  Previous notes reviewed.  Continue Loprox cream as needed.  Return to clinic and see me in 6 months.      Low to moderate level of medical decision making.      Again, thank you for allowing me to participate in the care of your patient.      Sincerely,    Yazan Parker DPM

## 2024-03-19 DIAGNOSIS — E13.9 LADA (LATENT AUTOIMMUNE DIABETES MELLITUS IN ADULTS) (H): ICD-10-CM

## 2024-03-20 DIAGNOSIS — J30.2 SEASONAL ALLERGIC RHINITIS: ICD-10-CM

## 2024-03-20 DIAGNOSIS — E13.9 LADA (LATENT AUTOIMMUNE DIABETES MELLITUS IN ADULTS) (H): ICD-10-CM

## 2024-03-20 PROBLEM — M25.562 CHRONIC PAIN OF BOTH KNEES: Status: RESOLVED | Noted: 2024-02-29 | Resolved: 2024-03-20

## 2024-03-20 PROBLEM — G89.29 CHRONIC PAIN OF BOTH KNEES: Status: RESOLVED | Noted: 2024-02-29 | Resolved: 2024-03-20

## 2024-03-20 PROBLEM — M25.561 CHRONIC PAIN OF BOTH KNEES: Status: RESOLVED | Noted: 2024-02-29 | Resolved: 2024-03-20

## 2024-03-20 RX ORDER — FLUTICASONE PROPIONATE 50 MCG
2 SPRAY, SUSPENSION (ML) NASAL DAILY
Qty: 16 G | Refills: 3 | Status: SHIPPED | OUTPATIENT
Start: 2024-03-20

## 2024-03-20 RX ORDER — PEN NEEDLE, DIABETIC 32GX 5/32"
NEEDLE, DISPOSABLE MISCELLANEOUS
Qty: 400 EACH | Refills: 3 | Status: SHIPPED | OUTPATIENT
Start: 2024-03-20 | End: 2024-06-18

## 2024-03-20 NOTE — TELEPHONE ENCOUNTER
Pt requesting refill of Flonase nasal spray.     Thanks,  Edie Rhodes, PharmD  [facility]  Phone: [phone]

## 2024-03-20 NOTE — PROGRESS NOTES
"    DISCHARGE  Reason for Discharge: Patient reports via [email or web address] that his symptoms have notably improved, and he will continue with his home exercise program independently.    Equipment Issued: NA    Discharge Plan: Patient to continue home program.    Referring Provider:  Violet Lucero           03/06/24 0500   Appointment Info   Signing clinician's name / credentials Bob Aguayo, PT   Visits Used 2   Medical Diagnosis Chronic pain of both knees   PT Tx Diagnosis Bilateral knee pain with strength deficits   Progress Note/Certification   Start of Care Date 02/29/24   Onset of illness/injury or Date of Surgery 01/15/24   Therapy Frequency 1 time per week   Predicted Duration 8 weeks   Certification date from 02/29/24   Certification date to 04/25/24   Progress Note Due Date 03/28/24   PT Goal 1   Goal Identifier HEP   Goal Description Piter will demonstrate mastery of ([id number] need for cueing) and compliance with (completion on at least 5/7 days per week) his home exercise program to facilitate increased rate of improvement.   Goal Progress In progress   Target Date 03/28/24   PT Goal 2   Goal Identifier KOS   Goal Description Piter will demonstrate significantly improved function as evidenced by an improved KOS score of at least 95.71%.   Goal Progress 85.71%   Target Date 04/25/24   PT Goal 3   Goal Identifier Exercise   Goal Description Piter will demonstrate improved [place] to recreational activities as evidenced by his ability to participate in all desired exercise(s) without limitation and with self-report knee pain no higher than 2/10.   Goal Progress Unable   Target Date 04/25/24   Subjective Report   Subjective Report Patient reports things are feeling better today since last visit. States walking and squatting, sitting longer periods have improved; are less painful. Piter feels his back muscles seem \"less tight\" as well. Patient reports no issues with HEP. Reports 1/10 pain today.   Objective " Measures   Objective Measures Objective Measure 1;Objective Measure 2;Objective Measure 3   Objective Measure 1   Objective Measure Hip Extension Strength   Details Initial evaluation: Right: 5/5. Left: 4+/5.   Objective Measure 2   Objective Measure Single-leg Squat   Details Initial evaluation: Anterior knee translation, Knee valgus, Hip internal rotation, Increased trunk lean, Improper use of glutes/hips, and deviations more noted on left than right   Objective Measure 3   Objective Measure Worst Pain   Details Initial visit: 7-8/10   Therapeutic Procedure/Exercise   Therapeutic Procedures: strength, endurance, ROM, flexibility minutes (55764) 39   Ther Proc 1 Side lying Clams w/ green TB   Ther Proc 1 - Details 3 x 10 B (verbal and tactile cueing to avoid posterior trunk rotation)   Ther Proc 2 Bridge/adduction (pillow between knees)/abduction (green TB)   Ther Proc 2 - Details 1 x 10 x 5 second holds/1 x 10 x 5 second holds/1 x 10 x 5 second holds   Ther Proc 3 Supine hamstring stretche   Ther Proc 3 - Details 3 x 30 second holds bilateral   Ther Proc 4 Seated piriformis stretch   Ther Proc 4 - Details 3 x 30 second holds bilateral   Skilled Intervention Exercises to increase hip and knee flexibility and strength/stability   Patient Response/Progress Patient reports hamstring/glut fatigue with Bridge modifications, as well as B quad fatigue following straight leg raises, though he denies any pain.  Verbal cues to ensure B LE hip width apart.with bridges. Verbal and tactile cues given to ensure optimal velocity of movement with new exercises.   Ther Proc 5 Treadmill + subjective report   Ther Proc 5 - Details 5 min   Ther Proc 6 Supine Straight leg raise   Ther Proc 6 - Details 3 x 10 B (verbal and tactile cueing to maintain knee extension and control rate of movement)   Education   Learner/Method Patient;Listening;Demonstration;Pictures/Video;No Barriers to Learning   Plan   Home program See PTRx.   Plan for  next session Continue to progress B LE strengthening, consider open chain, assess balance (SLS)   Comments   Comments Piter reports decreased pain since initial evaluation with increased activity tolerance overall. He said he was able to perform the home exercises without any pain. He demonstrated increased hamstring flexibility today while performing the supine hamstring stretch. Therapy today focused on review and progression of knee and hip strength/stability exercises, all of which he tolerated well. He will continue to benefit from physical therapy to progress toward his goals.   Total Session Time   Timed Code Treatment Minutes 39   Total Treatment Time (sum of timed and untimed services) 39

## 2024-03-22 RX ORDER — PEN NEEDLE, DIABETIC 32GX 5/32"
NEEDLE, DISPOSABLE MISCELLANEOUS
Refills: 3 | OUTPATIENT
Start: 2024-03-22

## 2024-03-22 NOTE — TELEPHONE ENCOUNTER
insulin pen needle (BD MICH U/F) 32G X 4 MM miscellaneous 400 each 3 3/20/2024   PINGel/ Addressed in a different encounter.

## 2024-03-25 DIAGNOSIS — E10.65 TYPE 1 DIABETES MELLITUS WITH HYPERGLYCEMIA (H): ICD-10-CM

## 2024-04-01 NOTE — TELEPHONE ENCOUNTER
Continuous Blood Gluc Sensor (FREESTYLE NAVEED 2 SENSOR) MISC 6 each 11 3/10/2023       Last Office Visit: 11/14/23  Future Office visit:   none      Refill protocol passed    Paulina Pizarro RN  P Red Flag Triage/MRT

## 2024-04-09 DIAGNOSIS — E13.9 LADA (LATENT AUTOIMMUNE DIABETES MELLITUS IN ADULTS) (H): ICD-10-CM

## 2024-04-09 RX ORDER — INSULIN ASPART 100 [IU]/ML
INJECTION, SOLUTION INTRAVENOUS; SUBCUTANEOUS
Qty: 45 ML | Refills: 1 | Status: SHIPPED | OUTPATIENT
Start: 2024-04-09

## 2024-04-09 NOTE — TELEPHONE ENCOUNTER
NOVOLOG FLEXPEN 100UNIT/ML SOPN   Last Written Prescription Date:  3/10/2023  Last Fill Quantity: 45,   # refills: 3  Last Office Visit : 11/14/2023  Future Office visit:  9/26/2024    Routing refill request to provider for review/approval because:  Drug not on the [facility], P or [facility] refill protocol or controlled substance    Lupe Oliveira RN  Central Triage Red Flags/Med Refills

## 2024-04-23 RX ORDER — BISACODYL 5 MG/1
TABLET, DELAYED RELEASE ORAL
Qty: 4 TABLET | Refills: 0 | Status: SHIPPED | OUTPATIENT
Start: 2024-04-23 | End: 2024-06-18

## 2024-04-23 NOTE — TELEPHONE ENCOUNTER
Extended Golytely Bowel Prep . Instructions were sent via GetThis. Bowel prep was sent 4/23/2024 to    [place], [place] - [address]      Shamika Lorenzo RN Colorectal Cancer    Division of Gastroenterology at [facility]

## 2024-05-13 ENCOUNTER — TELEPHONE (OUTPATIENT)
Dept: GASTROENTEROLOGY | Facility: CLINIC | Age: 49
End: 2024-05-13
Payer: COMMERCIAL

## 2024-05-14 NOTE — TELEPHONE ENCOUNTER
Attempted to contact patient in order to complete pre assessment questions.     No answer. Left message to return call to [phone] option 4    Callback communication sent via iConText.    Madalyn Ramirez RN

## 2024-05-14 NOTE — TELEPHONE ENCOUNTER
Pre visit planning completed.      Procedure details:    Patient scheduled for Colonoscopy  on 5/23/24.     Arrival time: 0945. Procedure time 1030    Facility location: [facility]; [address] [place] [address] [place] [address]. Check in location: [facility].     Sedation type: Conscious sedation     Pre op exam needed? N/A    Indication for procedure: Screening      Chart review:     Electronic implanted devices? No    Recent diagnosis of diverticulitis within the last 6 weeks? No    Diabetic? Yes. Diabetic medication HOLDING recommendations: Insulin: Yes. Consult with managing provider       Medication review:    Anticoagulants? No    NSAIDS? Yes.  Celebrex (celecoxib).  Holding interval of 3 days.    Other medication HOLDING recommendations:  N/A      Prep for procedure:     Bowel prep recommendation: Extended Golytely. Bowel prep prescription sent to [place], [place] - [address] by [facility] nursing team on 4/23/24.  Due to: constipation noted or reported.  and diabetes.     Prep instructions sent via [facility]  Endoscopy Procedure Pre Assessment RN  [phone] option 4

## 2024-05-15 NOTE — TELEPHONE ENCOUNTER
Pre assessment completed for upcoming procedure.   (Please see previous telephone encounter notes for complete details)    Patient  returned call.       Procedure details:    Arrival time and facility location reviewed.    Pre op exam needed? N/A    Designated  policy reviewed. Instructed to have someone stay 6  hours post procedure.       Medication review:    Insulin.  Consult with your managing provider.   NSAID medication(s): Celecoxib (Celebrex): HOLD 3 days before procedure       Prep for procedure:     Procedure prep instructions reviewed.        Any additional information needed:  N/A      Patient  verbalized understanding and had no questions or concerns at this time.      Celena Damon RN  Endoscopy Procedure Pre Assessment RN  [phone] option 4

## 2024-05-16 ENCOUNTER — TELEPHONE (OUTPATIENT)
Dept: GASTROENTEROLOGY | Facility: CLINIC | Age: 49
End: 2024-05-16
Payer: COMMERCIAL

## 2024-05-16 NOTE — TELEPHONE ENCOUNTER
Caller: Piter CHENG Said      Reason for Reschedule/Cancellation   (please be detailed, any staff messages or encounters to note?): PT DID NOT FEEL HE COULD PREP FOR THIS PROCEDURE.      Prior to reschedule please review:  Ordering Provider:     JENNI OKEEFE     Sedation Determined: MODERATE  Does patient have any ASC Exclusions, please identify?: N      Notes on Cancelled Procedure:  Procedure: Lower Endoscopy [Colonoscopy]   Date: 05/23/2024  Location: [facility]; [facility] [place] [address] [place] [address]   Surgeon: JESSICA      Rescheduled: No,         Did you cancel or rescheduled an EUS procedure? No.

## 2024-06-18 ENCOUNTER — MYC MEDICAL ADVICE (OUTPATIENT)
Dept: FAMILY MEDICINE | Facility: CLINIC | Age: 49
End: 2024-06-18

## 2024-06-18 ENCOUNTER — OFFICE VISIT (OUTPATIENT)
Dept: ENDOCRINOLOGY | Facility: CLINIC | Age: 49
End: 2024-06-18
Payer: COMMERCIAL

## 2024-06-18 VITALS
WEIGHT: 171 LBS | HEART RATE: 71 BPM | SYSTOLIC BLOOD PRESSURE: 123 MMHG | BODY MASS INDEX: 22.61 KG/M2 | DIASTOLIC BLOOD PRESSURE: 79 MMHG | OXYGEN SATURATION: 95 %

## 2024-06-18 DIAGNOSIS — Z12.11 COLON CANCER SCREENING: Primary | ICD-10-CM

## 2024-06-18 DIAGNOSIS — E13.9 LADA (LATENT AUTOIMMUNE DIABETES MELLITUS IN ADULTS) (H): ICD-10-CM

## 2024-06-18 LAB — HBA1C MFR BLD: 7.4 %

## 2024-06-18 PROCEDURE — 83036 HEMOGLOBIN GLYCOSYLATED A1C: CPT | Performed by: PATHOLOGY

## 2024-06-18 PROCEDURE — 99215 OFFICE O/P EST HI 40 MIN: CPT | Performed by: PHYSICIAN ASSISTANT

## 2024-06-18 RX ORDER — PEN NEEDLE, DIABETIC 32GX 5/32"
NEEDLE, DISPOSABLE MISCELLANEOUS
Qty: 400 EACH | Refills: 3 | Status: SHIPPED | OUTPATIENT
Start: 2024-06-18

## 2024-06-18 RX ORDER — INSULIN GLARGINE 100 [IU]/ML
INJECTION, SOLUTION SUBCUTANEOUS
Qty: 15 ML | Refills: 4 | Status: SHIPPED | OUTPATIENT
Start: 2024-06-18

## 2024-06-18 ASSESSMENT — PAIN SCALES - GENERAL: PAINLEVEL: MILD PAIN (2)

## 2024-06-18 NOTE — PROGRESS NOTES
HPI  Piter Conway is a 49 year old male with type 1 diabetes mellitus.  Clinic visit today for diabetes follow up.  Last visit was with me in November 2023.  Pt was started on insulin in Nov 2019.    His EJ was + with C-peptide 0.8 ng/mL.  Piter reports having mild numbness in both feet with has improved.  He has been seen by podiatry.  Patient has no hx of retinopathy or nephropathy.  For his diabetes, he is currently taking Lantus 14 units subcutaneous each am and Novolog 1 unit/15 gms CHO with meals with correction insulin.  Pt's A1C is 7.4 % today.  Previous A1C was 6.8 % on 2/2/2024.  His A1C was 10.6 % in 2014 time of diabetes diagnosis.  I reviewed and scanned his Freestyle Libre2 sensor download data in his note below.  His average glucose 162.   His glucoses in target range 62% of the time.  On ROS today, less numbness in feet.  No foot ulcers.  He has gained weight since using insulin.  Pt denies frequent headaches,blurred vision, n/v, SOB,cough, fever, chills, chest pain,abd pain, diarrhea,dysuria or hematuria.    Diabetes Care  Retinopathy:none; he was seen by [facility] in Dec 2023 with no diabetic retinopathy.  Nephropathy:none; pt's urine microalbuminuria negative in 9/2022.  Neuropathy: mild numbness in both feet.  Foot Exam:no ulcers.  Taking aspirin:no  Lipids:  in September 2023.  CAD: no.  Mental health: dx of adjustment disorder with mixed anxiety and depressed mood during the COVID pandemic which has improved per patient.  Insulin: Basal and meal time insulin with correction scale.  Testing: Freestyle Libre2 sensor.     Blood Glucose Monitoring :           .      ROS  Please see under HPI.    Allergies  No Known Allergies    Medications  Current Outpatient Medications   Medication Sig Dispense Refill    ACCU-CHEK GUIDE test strip TEST BLOOD SUGAR TWO TIMES A DAY OR AS DIRECTED 200 strip 2    ammonium lactate (AMLACTIN) 12 % external cream Apply topically daily To feet. 385 g 5    bisacodyl  (DULCOLAX) 5 MG EC tablet Two days prior to exam take two (2) tablets at 4pm. One day prior to exam take two (2) tablets at 4pm 4 tablet 0    blood glucose monitoring (ACCU-CHEK FASTCLIX) lancets TEST TWO TIMES A  each 3    celecoxib (CELEBREX) 200 MG capsule Take 1 capsule (200 mg) by mouth daily 90 capsule 1    ciclopirox (LOPROX) 0.77 % cream Apply topically 2 times daily To feet. 90 g 5    Continuous Blood Gluc Sensor (FREESTYLE NAVEED 2 SENSOR) MISC Change every 14 days. 6 each 3    DiphenhydrAMINE HCl (BENADRYL ALLERGY PO) Take 1 tablet by mouth daily      fluticasone (FLONASE) 50 MCG/ACT nasal spray Spray 2 sprays into both nostrils daily 16 g 3    insulin aspart (NOVOLOG FLEXPEN) 100 UNIT/ML pen INJECT 1 UNIT UNDER THE SKIN PER 15 GRAMS OF CARBOHYDRATES, PLUS 1 ADDITIONAL UNIT FOR EVERY 50 OVER 150 OF BLOOD SUGAR (MAX DAILY DOSE 50 UNITS) 45 mL 1    insulin glargine (LANTUS SOLOSTAR) 100 UNIT/ML pen INJECT 13 UNITS SUBCUTANEOUS EVERY MORNING.PLEASE DO NOT FILL TODAY( 11/14/2023). 15 mL 4    insulin pen needle (BD MICH U/F) 32G X 4 MM miscellaneous USE 4 PEN NEEDLES DAILY OR AS DIRECTED 400 each 3    omeprazole (PRILOSEC) 20 MG DR capsule Take 20 mg po twice daily 180 capsule 3    polyethylene glycol (GOLYTELY) 236 g suspension Take as directed. Two days before your exam fill the first container with water. Cover and shake until mixed well. At 5:00pm drink one 8oz glass every 10-15 minutes until half (1/2) of the first container is empty. Store the remainder in the refrigerator. One day before your exam at 5:00pm drink the second half of the first container until it is gone. Before you go to bed mix the second container with water and put in refrigerator. Six hours before your check in time drink one 8oz glass every 10-15 minutes until half of container is empty. Discard the remainder of solution. 8000 mL 0    polyethylene glycol (MIRALAX) powder Take 17 g (1 capful) by mouth 2 times daily as needed for  constipation 510 g 1    urea (GORMEL) 20 % external cream Apply topically daily To feet. 120 g 5       Family History  family history includes Aortic stenosis in his father; Autism Spectrum Disorder in his son; Diabetes in his brother, father, and mother; Hearing Loss in his son; Heart Disease (age of onset: 75) in his father; Heart Failure in his father; Hypertension in his father and mother; Osteoporosis in his mother; Other - See Comments in his brother and father.  He also has a younger brother with type 1 diabetes using an insulin pump.    Social History  Smoke: none.  ETOH: none.    Past Medical History    1. SAMANTA/DM 1 - dx 2014.  Past Medical History:   Diagnosis Date    Diabetes (H) 2014    History of hepatitis A 2001   Adjustment disorder with mixed anxiety and depressed mood.    Physical Exam    /79   Pulse 71   Wt 77.6 kg (171 lb)   SpO2 95%   BMI 22.61 kg/m       Feet: No ulcers.    RESULTS  Creatinine   Date Value Ref Range Status   02/02/2024 0.82 0.67 - 1.17 mg/dL Final   11/28/2020 0.89 0.66 - 1.25 mg/dL Final     GFR Estimate   Date Value Ref Range Status   02/02/2024 >90 >60 mL/min/1.73m2 Final   11/28/2020 >90 >60 mL/min/[1.73_m2] Final     Comment:     Non  GFR Calc  Starting 12/18/2018, serum creatinine based estimated GFR (eGFR) will be   calculated using the Chronic Kidney Disease Epidemiology Collaboration   (CKD-EPI) equation.       Hemoglobin A1C   Date Value Ref Range Status   02/02/2024 6.8 (H) 0.0 - 5.6 % Final     Comment:     Normal <5.7%   Prediabetes 5.7-6.4%    Diabetes 6.5% or higher     Note: Adopted from ADA consensus guidelines.   06/30/2021 7.3 (H) 0 - 5.6 % Final     Comment:     Normal <5.7% Prediabetes 5.7-6.4%  Diabetes 6.5% or higher - adopted from ADA   consensus guidelines.       Potassium   Date Value Ref Range Status   02/02/2024 4.3 3.4 - 5.3 mmol/L Final   11/28/2020 4.5 3.4 - 5.3 mmol/L Final     ALT   Date Value Ref Range Status    11/28/2020 49 0 - 70 U/L Final     AST   Date Value Ref Range Status   09/27/2023 17 0 - 45 U/L Final     Comment:     Reference intervals for this test were updated on 6/12/2023 to more accurately reflect our healthy population. There may be differences in the flagging of prior results with similar values performed with this method. Interpretation of those prior results can be made in the context of the updated reference intervals.   11/28/2020 11 0 - 45 U/L Final     TSH   Date Value Ref Range Status   02/02/2024 2.22 0.30 - 4.20 uIU/mL Final   09/21/2022 1.39 0.40 - 4.00 mU/L Final   06/30/2021 1.19 0.40 - 4.00 mU/L Final     T4 Free   Date Value Ref Range Status   12/11/2017 1.12 0.76 - 1.46 ng/dL Final       Cholesterol   Date Value Ref Range Status   09/27/2023 164 <200 mg/dL Final   09/21/2022 157 <200 mg/dL Final   11/28/2020 159 <200 mg/dL Final   09/17/2020 154.0 0.0 - 200.0 Final     HDL Cholesterol   Date Value Ref Range Status   11/28/2020 43 >39 mg/dL Final   09/17/2020 46.0 >40.0 Final     Direct Measure HDL   Date Value Ref Range Status   09/27/2023 50 >=40 mg/dL Final   09/21/2022 43 >=40 mg/dL Final     LDL Cholesterol Calculated   Date Value Ref Range Status   09/27/2023 102 (H) <=100 mg/dL Final   09/21/2022 102 (H) <=100 mg/dL Final   11/28/2020 103 (H) <100 mg/dL Final     Comment:     Above desirable:  100-129 mg/dl  Borderline High:  130-159 mg/dL  High:             160-189 mg/dL  Very high:       >189 mg/dl     02/27/2020 82 <100 mg/dL Final     Comment:     Desirable:       <100 mg/dl     LDL Cholesterol Direct   Date Value Ref Range Status   09/17/2020 86.0 0.0 - 129.0 Final     Triglycerides   Date Value Ref Range Status   09/27/2023 59 <150 mg/dL Final   09/21/2022 61 <150 mg/dL Final   11/28/2020 68 <150 mg/dL Final   09/17/2020 107.0 0.0 - 150.0 Final     Cholesterol/HDL Ratio   Date Value Ref Range Status   09/17/2020 3.3 0.0 - 5.0 Final   08/27/2015 3.0 0.0 - 5.0 Final        ASSESSMENT/PLAN:    1.  TYPE 1 DIABETES MELLITUS:  Piter had a + EJ antibody and C-peptide 0.8 ng/mL with glucose 144 in Nov 2019.  He started taking insulin in Nov 2019 and he is doing much better and has gained weight.  Reminded him to take Novolog for all food intake and to take bolus insulin before eating.  Increase Lantus 15 units subcutaneous each am.  After 1 week, if blood sugar values fasting in the morning remain > 130, patient instructed to increase Lantus to 16 units subcutaneous each a.m.  He is to continue to monitor his blood sugar using his Freestyle Libre2 sensor.  Pt's urine microalbuminuria was negative in Sept 2023.  Piter was seen by Oph here in Dec 2023 without retinopathy.   He reports less numbness in both feet. Seen by Podiatry in March 2024.  Pt's LDL was 102 in 9/2022. He has worked on reducing fat in his diet. Declines statin.  /79 today.    2. FOLLOW UP: with me in clinic in Oct or Nov 2024.   A1C ordered today- to be done after 10/1/2024.      Time spent reviewing chart, labs and Freestyle Libre2 sensor download data  today = 5 minutes.  Time for clinic visit today = 20  minutes.  Time for documentation today = 15 minutes.    TOTAL TIME FOR VISIT TODAY = 40  minutes.    Sheila Brewer PA-C

## 2024-06-18 NOTE — LETTER
6/18/2024       RE: Piter CHENG Said  [address]  [facility] [address]     Dear Colleague,    Thank you for referring your patient, Piter CHENG Said, to the [facility] [place] [facility]. Please see a copy of my visit note below.    HPI  Piter Conway is a 49 year old male with type 1 diabetes mellitus.  Clinic visit today for diabetes follow up.  Last visit was with me in November 2023.  Pt was started on insulin in Nov 2019.    His EJ was + with C-peptide 0.8 ng/mL.  Piter reports having mild numbness in both feet with has improved.  He has been seen by podiatry.  Patient has no hx of retinopathy or nephropathy.  For his diabetes, he is currently taking Lantus 14 units subcutaneous each am and Novolog 1 unit/15 gms CHO with meals with correction insulin.  Pt's A1C is 7.4 % today.  Previous A1C was 6.8 % on 2/2/2024.  His A1C was 10.6 % in 2014 time of diabetes diagnosis.  I reviewed and scanned his Freestyle Libre2 sensor download data in his note below.  His average glucose 162.   His glucoses in target range 62% of the time.  On ROS today, less numbness in feet.  No foot ulcers.  He has gained weight since using insulin.  Pt denies frequent headaches,blurred vision, n/v, SOB,cough, fever, chills, chest pain,abd pain, diarrhea,dysuria or hematuria.    Diabetes Care  Retinopathy:none; he was seen by Oph in Dec 2023 with no diabetic retinopathy.  Nephropathy:none; pt's urine microalbuminuria negative in 9/2022.  Neuropathy: mild numbness in both feet.  Foot Exam:no ulcers.  Taking aspirin:no  Lipids:  in September 2023.  CAD: no.  Mental health: dx of adjustment disorder with mixed anxiety and depressed mood during the COVID pandemic which has improved per patient.  Insulin: Basal and meal time insulin with correction scale.  Testing: Freestyle Libre2 sensor.     Blood Glucose Monitoring :           .      ROS  Please see under  HPI.    Allergies  No Known Allergies    Medications  Current Outpatient Medications   Medication Sig Dispense Refill    ACCU-CHEK GUIDE test strip TEST BLOOD SUGAR TWO TIMES A DAY OR AS DIRECTED 200 strip 2    ammonium lactate (AMLACTIN) 12 % external cream Apply topically daily To feet. 385 g 5    bisacodyl (DULCOLAX) 5 MG EC tablet Two days prior to exam take two (2) tablets at 4pm. One day prior to exam take two (2) tablets at 4pm 4 tablet 0    blood glucose monitoring (ACCU-CHEK FASTCLIX) lancets TEST TWO TIMES A  each 3    celecoxib (CELEBREX) 200 MG capsule Take 1 capsule (200 mg) by mouth daily 90 capsule 1    ciclopirox (LOPROX) 0.77 % cream Apply topically 2 times daily To feet. 90 g 5    Continuous Blood Gluc Sensor (FREESTYLE NAVEED 2 SENSOR) MISC Change every 14 days. 6 each 3    DiphenhydrAMINE HCl (BENADRYL ALLERGY PO) Take 1 tablet by mouth daily      fluticasone (FLONASE) 50 MCG/ACT nasal spray Spray 2 sprays into both nostrils daily 16 g 3    insulin aspart (NOVOLOG FLEXPEN) 100 UNIT/ML pen INJECT 1 UNIT UNDER THE SKIN PER 15 GRAMS OF CARBOHYDRATES, PLUS 1 ADDITIONAL UNIT FOR EVERY 50 OVER 150 OF BLOOD SUGAR (MAX DAILY DOSE 50 UNITS) 45 mL 1    insulin glargine (LANTUS SOLOSTAR) 100 UNIT/ML pen INJECT 13 UNITS SUBCUTANEOUS EVERY MORNING.PLEASE DO NOT FILL TODAY( 11/14/2023). 15 mL 4    insulin pen needle (BD MICH U/F) 32G X 4 MM miscellaneous USE 4 PEN NEEDLES DAILY OR AS DIRECTED 400 each 3    omeprazole (PRILOSEC) 20 MG DR capsule Take 20 mg po twice daily 180 capsule 3    polyethylene glycol (GOLYTELY) 236 g suspension Take as directed. Two days before your exam fill the first container with water. Cover and shake until mixed well. At 5:00pm drink one 8oz glass every 10-15 minutes until half (1/2) of the first container is empty. Store the remainder in the refrigerator. One day before your exam at 5:00pm drink the second half of the first container until it is gone. Before you go to bed  mix the second container with water and put in refrigerator. Six hours before your check in time drink one 8oz glass every 10-15 minutes until half of container is empty. Discard the remainder of solution. 8000 mL 0    polyethylene glycol (MIRALAX) powder Take 17 g (1 capful) by mouth 2 times daily as needed for constipation 510 g 1    urea (GORMEL) 20 % external cream Apply topically daily To feet. 120 g 5       Family History  family history includes Aortic stenosis in his father; Autism Spectrum Disorder in his son; Diabetes in his brother, father, and mother; Hearing Loss in his son; Heart Disease (age of onset: 75) in his father; Heart Failure in his father; Hypertension in his father and mother; Osteoporosis in his mother; Other - See Comments in his brother and father.  He also has a younger brother with type 1 diabetes using an insulin pump.    Social History  Smoke: none.  ETOH: none.    Past Medical History    1. SAMANTA/DM 1 - dx 2014.  Past Medical History:   Diagnosis Date    Diabetes (H) 2014    History of hepatitis A 2001   Adjustment disorder with mixed anxiety and depressed mood.    Physical Exam    /79   Pulse 71   Wt 77.6 kg (171 lb)   SpO2 95%   BMI 22.61 kg/m       Feet: No ulcers.    RESULTS  Creatinine   Date Value Ref Range Status   02/02/2024 0.82 0.67 - 1.17 mg/dL Final   11/28/2020 0.89 0.66 - 1.25 mg/dL Final     GFR Estimate   Date Value Ref Range Status   02/02/2024 >90 >60 mL/min/1.73m2 Final   11/28/2020 >90 >60 mL/min/[1.73_m2] Final     Comment:     Non  GFR Calc  Starting 12/18/2018, serum creatinine based estimated GFR (eGFR) will be   calculated using the Chronic Kidney Disease Epidemiology Collaboration   (CKD-EPI) equation.       Hemoglobin A1C   Date Value Ref Range Status   02/02/2024 6.8 (H) 0.0 - 5.6 % Final     Comment:     Normal <5.7%   Prediabetes 5.7-6.4%    Diabetes 6.5% or higher     Note: Adopted from ADA consensus guidelines.   06/30/2021 7.3  (H) 0 - 5.6 % Final     Comment:     Normal <5.7% Prediabetes 5.7-6.4%  Diabetes 6.5% or higher - adopted from ADA   consensus guidelines.       Potassium   Date Value Ref Range Status   02/02/2024 4.3 3.4 - 5.3 mmol/L Final   11/28/2020 4.5 3.4 - 5.3 mmol/L Final     ALT   Date Value Ref Range Status   11/28/2020 49 0 - 70 U/L Final     AST   Date Value Ref Range Status   09/27/2023 17 0 - 45 U/L Final     Comment:     Reference intervals for this test were updated on 6/12/2023 to more accurately reflect our healthy population. There may be differences in the flagging of prior results with similar values performed with this method. Interpretation of those prior results can be made in the context of the updated reference intervals.   11/28/2020 11 0 - 45 U/L Final     TSH   Date Value Ref Range Status   02/02/2024 2.22 0.30 - 4.20 uIU/mL Final   09/21/2022 1.39 0.40 - 4.00 mU/L Final   06/30/2021 1.19 0.40 - 4.00 mU/L Final     T4 Free   Date Value Ref Range Status   12/11/2017 1.12 0.76 - 1.46 ng/dL Final       Cholesterol   Date Value Ref Range Status   09/27/2023 164 <200 mg/dL Final   09/21/2022 157 <200 mg/dL Final   11/28/2020 159 <200 mg/dL Final   09/17/2020 154.0 0.0 - 200.0 Final     HDL Cholesterol   Date Value Ref Range Status   11/28/2020 43 >39 mg/dL Final   09/17/2020 46.0 >40.0 Final     Direct Measure HDL   Date Value Ref Range Status   09/27/2023 50 >=40 mg/dL Final   09/21/2022 43 >=40 mg/dL Final     LDL Cholesterol Calculated   Date Value Ref Range Status   09/27/2023 102 (H) <=100 mg/dL Final   09/21/2022 102 (H) <=100 mg/dL Final   11/28/2020 103 (H) <100 mg/dL Final     Comment:     Above desirable:  100-129 mg/dl  Borderline High:  130-159 mg/dL  High:             160-189 mg/dL  Very high:       >189 mg/dl     02/27/2020 82 <100 mg/dL Final     Comment:     Desirable:       <100 mg/dl     LDL Cholesterol Direct   Date Value Ref Range Status   09/17/2020 86.0 0.0 - 129.0 Final      Triglycerides   Date Value Ref Range Status   09/27/2023 59 <150 mg/dL Final   09/21/2022 61 <150 mg/dL Final   11/28/2020 68 <150 mg/dL Final   09/17/2020 107.0 0.0 - 150.0 Final     Cholesterol/HDL Ratio   Date Value Ref Range Status   09/17/2020 3.3 0.0 - 5.0 Final   08/27/2015 3.0 0.0 - 5.0 Final       ASSESSMENT/PLAN:    1.  TYPE 1 DIABETES MELLITUS:  Piter had a + EJ antibody and C-peptide 0.8 ng/mL with glucose 144 in Nov 2019.  He started taking insulin in Nov 2019 and he is doing much better and has gained weight.  Reminded him to take Novolog for all food intake and to take bolus insulin before eating.  Increase Lantus 15 units subcutaneous each am.  After 1 week, if blood sugar values fasting in the morning remain > 130, patient instructed to increase Lantus to 16 units subcutaneous each a.m.  He is to continue to monitor his blood sugar using his Freestyle Libre2 sensor.  Pt's urine microalbuminuria was negative in Sept 2023.  Piter was seen by Oph here in Dec 2023 without retinopathy.   He reports less numbness in both feet. Seen by Podiatry in March 2024.  Pt's LDL was 102 in 9/2022. He has worked on reducing fat in his diet. Declines statin.  /79 today.    2. FOLLOW UP: with me in clinic in Oct or Nov 2024.   A1C ordered today- to be done after 10/1/2024.      Time spent reviewing chart, labs and Freestyle Libre2 sensor download data  today = 5 minutes.  Time for clinic visit today = 20  minutes.  Time for documentation today = 15 minutes.    TOTAL TIME FOR VISIT TODAY = 40  minutes.    Sheila Brewer PA-C          Blood Glucose Monitoring :           .

## 2024-06-18 NOTE — NURSING NOTE
"Chief Complaint   Patient presents with    Diabetes     Vital signs:      BP: 123/79 Pulse: 71     SpO2: 95 %       Weight: 77.6 kg (171 lb)  Estimated body mass index is 22.61 kg/m  as calculated from the following:    Height as of 2/2/24: 1.852 m (6' 0.91\").    Weight as of this encounter: 77.6 kg (171 lb).        "

## 2024-06-25 ENCOUNTER — ORDERS ONLY (AUTO-RELEASED) (OUTPATIENT)
Dept: FAMILY MEDICINE | Facility: CLINIC | Age: 49
End: 2024-06-25

## 2024-06-25 DIAGNOSIS — Z12.11 COLON CANCER SCREENING: ICD-10-CM

## 2024-07-06 LAB — NONINV COLON CA DNA+OCC BLD SCRN STL QL: NEGATIVE

## 2024-09-26 ENCOUNTER — OFFICE VISIT (OUTPATIENT)
Dept: ENDOCRINOLOGY | Facility: CLINIC | Age: 49
End: 2024-09-26
Payer: COMMERCIAL

## 2024-09-26 DIAGNOSIS — M20.5X2 HALLUX LIMITUS OF LEFT FOOT: ICD-10-CM

## 2024-09-26 DIAGNOSIS — E10.42 DM TYPE 1 WITH DIABETIC PERIPHERAL NEUROPATHY (H): Primary | ICD-10-CM

## 2024-09-26 DIAGNOSIS — M20.5X1 HALLUX LIMITUS OF RIGHT FOOT: ICD-10-CM

## 2024-09-26 PROCEDURE — 99213 OFFICE O/P EST LOW 20 MIN: CPT | Performed by: PODIATRIST

## 2024-09-26 NOTE — LETTER
9/26/2024       RE: Piter CHENG Said  [address] [place] [id number]     Dear Colleague,    Thank you for referring your patient, Piter CHENG Said, to the [facility] ENDOCRINOLOGY [facility]IN[facility] [place] at [facility]. Please see a copy of my visit note below.    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)     Upper back pain     Cervicalgia     Acute low back pain with radicular symptoms, duration less than 6 weeks     Chronic bilateral low back pain with left-sided sciatica     Bilateral low back pain without sciatica     Lateral epicondylitis of both elbows     Tension headache     Lumbar disc herniation     Past Surgical History:   Procedure Laterality Date     ESOPHAGOSCOPY, GASTROSCOPY, DUODENOSCOPY (EGD), COMBINED N/A 12/1/2020    Procedure: ESOPHAGOGASTRODUODENOSCOPY, WITH BIOPSY;  Surgeon: Fabio Mota DO;  Location: [facility] OR     ESOPHAGOSCOPY, GASTROSCOPY, DUODENOSCOPY (EGD), COMBINED N/A 1/3/2023    Procedure: ESOPHAGOGASTRODUODENOSCOPY (EGD);  Surgeon: Sarah Stafford MD;  Location: [facility] OR     NO HISTORY OF SURGERY       Social History     Socioeconomic History     Marital status:      Spouse name: Not on file     Number of children: 3     Years of education: Not on file     Highest education level: Not on file   Occupational History     Occupation: medical transport   Tobacco Use     Smoking status: Never     Smokeless tobacco: Never   Vaping Use     Vaping status: Never Used   Substance and Sexual Activity     Alcohol use: No     Drug use: No     Sexual activity: Yes     Partners: Female     Comment:    Other Topics Concern     Parent/sibling w/ CABG, MI or angioplasty before 65F 55M? Not Asked    Social History Narrative     Not on file     Social Determinants of Health     Financial Resource Strain: Low Risk  (2/2/2024)    Financial Resource Strain      Within the past 12 months, have you or your family members you live with been unable to get utilities (heat, electricity) when it was really needed?: No   Food Insecurity: Low Risk  (2/2/2024)    Food Insecurity      Within the past 12 months, did you worry that your food would run out before you got money to buy more?: No      Within the past 12 months, did the food you bought just not last and you didn t have money to get more?: No   Transportation Needs: Low Risk  (2/2/2024)    Transportation Needs      Within the past 12 months, has lack of transportation kept you from medical appointments, getting your medicines, non-medical meetings or appointments, work, or from getting things that you need?: No   Physical Activity: Insufficiently Active (2/2/2024)    Exercise Vital Sign      Days of Exercise per Week: 3 days      Minutes of Exercise per Session: 30 min   Stress: No Stress Concern Present (2/2/2024)    [employer] [place] [employer] - Occupational Stress Questionnaire      Feeling of Stress : Only a little   Social Connections: Unknown (2/2/2024)    Social Connection and Isolation Panel [NHANES]      Frequency of Communication with Friends and Family: Not on file      Frequency of Social Gatherings with Friends and Family: Once a week      Attends Mormonism Services: Not on file      Active Member of Clubs or Organizations: Not on file      Attends Club or Organization Meetings: Not on file      Marital Status: Not on file   Interpersonal Safety: Low Risk  (2/2/2024)    Interpersonal Safety      Do you feel physically and emotionally safe where you currently live?: Yes      Within the past 12 months, have you been hit, slapped, kicked or otherwise physically hurt by someone?: No      Within the past 12 months, have you been humiliated or  emotionally abused in other ways by your partner or ex-partner?: No   Housing Stability: Low Risk  (2/2/2024)    Housing Stability      Do you have housing? : Yes      Are you worried about losing your housing?: No     Family History   Problem Relation Age of Onset     Osteoporosis Mother      Diabetes Mother      Hypertension Mother      Hypertension Father      Diabetes Father      Heart Disease Father 75     Heart Failure Father      Aortic stenosis Father      Other - See Comments Father         enlarged prostate?     Diabetes Brother      Other - See Comments Brother         mental health     Hearing Loss Son         cochlear implant     Autism Spectrum Disorder Son      Glaucoma No family hx of      Macular Degeneration No family hx of            Lab Results   Component Value Date    A1C 7.4 06/18/2024    A1C 6.8 02/02/2024    A1C 7.1 09/27/2023    A1C 7.4 03/27/2023    A1C 7.9 09/21/2022    A1C 6.7 02/25/2022    A1C 7.3 06/30/2021    A1C 7.2 11/28/2020    A1C 7.6 09/17/2020    A1C 6.9 03/04/2019    A1C 6.3 12/11/2017           Subjective findings- 49-year-old returns clinic for Diabetic foot cares.  Relates he is doing well, relates to no ulcers or sores or injuries since we seen him last, relates he has occasional numbness and tingling in his feet that seem to correlate with when his blood sugars are high, relates he wears tennis shoes and has diabetic shoes and needs a new order for diabetic shoes.     Objective findings- DP and PT are 2 out of 4 bilaterally.  Has mild dorsal contracted digits 2 through 5 bilaterally.  Plantar medial hallux hyperkeratotic tissue buildup appears to be no longer present.  There is no erythema, no drainage, no odor, no calor, no pain on palpation, no gross tendon voids bilaterally.  Deep tendon reflexes are intact bilaterally, Sharp/dull is intact with 5.07 Symes Malissa monofilament bilaterally, proprioception is intact bilaterally.  Has functional hallux limitus  bilaterally.     Assessment and plan- Diabetes with intermittent Neuropathy symptoms, his protective sensations intact.  His previous calluses are resolved.  Diagnosis and treatment options discussed with them.  Previous notes reviewed.  Prescription for diabetic shoes and inserts given and he is given the phone number addressed to orthotics and prosthetics for these.  Continue Loprox cream as needed.  Return to clinic and see me in 6 months.                                                                     Low to moderate level of medical decision making.      Again, thank you for allowing me to participate in the care of your patient.      Sincerely,    Yazan Parker DPM     negative

## 2024-09-26 NOTE — PROGRESS NOTES
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)    Upper back pain    Cervicalgia    Acute low back pain with radicular symptoms, duration less than 6 weeks    Chronic bilateral low back pain with left-sided sciatica    Bilateral low back pain without sciatica    Lateral epicondylitis of both elbows    Tension headache    Lumbar disc herniation     Past Surgical History:   Procedure Laterality Date    ESOPHAGOSCOPY, GASTROSCOPY, DUODENOSCOPY (EGD), COMBINED N/A 12/1/2020    Procedure: ESOPHAGOGASTRODUODENOSCOPY, WITH BIOPSY;  Surgeon: Fabio Mota DO;  Location: [facility] [place]    ESOPHAGOSCOPY, GASTROSCOPY, DUODENOSCOPY (EGD), COMBINED N/A 1/3/2023    Procedure: ESOPHAGOGASTRODUODENOSCOPY (EGD);  Surgeon: Sarah Stafford MD;  Location: [facility] OR    NO HISTORY OF SURGERY       Social History     Socioeconomic History    Marital status:      Spouse name: Not on file    Number of children: 3    Years of education: Not on file    Highest education level: Not on file   Occupational History    Occupation: medical transport   Tobacco Use    Smoking status: Never    Smokeless tobacco: Never   Vaping Use    Vaping status: Never Used   Substance and Sexual Activity    Alcohol use: No    Drug use: No    Sexual activity: Yes     Partners: Female     Comment:    Other Topics Concern    Parent/sibling w/ CABG, MI or angioplasty before 65F 55M? Not Asked   Social History Narrative    Not on file     Social Determinants of Health     Financial Resource Strain: Low Risk  (2/2/2024)    Financial Resource Strain     Within the past 12 months, have you or your family members you live with been unable to get utilities (heat, electricity) when it was really needed?: No   Food Insecurity: Low Risk   (2/2/2024)    Food Insecurity     Within the past 12 months, did you worry that your food would run out before you got money to buy more?: No     Within the past 12 months, did the food you bought just not last and you didn t have money to get more?: No   Transportation Needs: Low Risk  (2/2/2024)    Transportation Needs     Within the past 12 months, has lack of transportation kept you from medical appointments, getting your medicines, non-medical meetings or appointments, work, or from getting things that you need?: No   Physical Activity: Insufficiently Active (2/2/2024)    Exercise Vital Sign     Days of Exercise per Week: 3 days     Minutes of Exercise per Session: 30 min   Stress: No Stress Concern Present (2/2/2024)    [employer] [place] [employer] - Occupational Stress Questionnaire     Feeling of Stress : Only a little   Social Connections: Unknown (2/2/2024)    Social Connection and Isolation Panel [NHANES]     Frequency of Communication with Friends and Family: Not on file     Frequency of Social Gatherings with Friends and Family: Once a week     Attends Congregation Services: Not on file     Active Member of Clubs or Organizations: Not on file     Attends Club or Organization Meetings: Not on file     Marital Status: Not on file   Interpersonal Safety: Low Risk  (2/2/2024)    Interpersonal Safety     Do you feel physically and emotionally safe where you currently live?: Yes     Within the past 12 months, have you been hit, slapped, kicked or otherwise physically hurt by someone?: No     Within the past 12 months, have you been humiliated or emotionally abused in other ways by your partner or ex-partner?: No   Housing Stability: Low Risk  (2/2/2024)    Housing Stability     Do you have housing? : Yes     Are you worried about losing your housing?: No     Family History   Problem Relation Age of Onset    Osteoporosis Mother     Diabetes Mother     Hypertension Mother     Hypertension Father      Diabetes Father     Heart Disease Father 75    Heart Failure Father     Aortic stenosis Father     Other - See Comments Father         enlarged prostate?    Diabetes Brother     Other - See Comments Brother         mental health    Hearing Loss Son         cochlear implant    Autism Spectrum Disorder Son     Glaucoma No family hx of     Macular Degeneration No family hx of            Lab Results   Component Value Date    A1C 7.4 06/18/2024    A1C 6.8 02/02/2024    A1C 7.1 09/27/2023    A1C 7.4 03/27/2023    A1C 7.9 09/21/2022    A1C 6.7 02/25/2022    A1C 7.3 06/30/2021    A1C 7.2 11/28/2020    A1C 7.6 09/17/2020    A1C 6.9 03/04/2019    A1C 6.3 12/11/2017           Subjective findings- 49-year-old returns clinic for Diabetic foot cares.  Relates he is doing well, relates to no ulcers or sores or injuries since we seen him last, relates he has occasional numbness and tingling in his feet that seem to correlate with when his blood sugars are high, relates he wears tennis shoes and has diabetic shoes and needs a new order for diabetic shoes.     Objective findings- DP and PT are 2 out of 4 bilaterally.  Has mild dorsal contracted digits 2 through 5 bilaterally.  Plantar medial hallux hyperkeratotic tissue buildup appears to be no longer present.  There is no erythema, no drainage, no odor, no calor, no pain on palpation, no gross tendon voids bilaterally.  Deep tendon reflexes are intact bilaterally, Sharp/dull is intact with 5.07 Symes Malissa monofilament bilaterally, proprioception is intact bilaterally.  Has functional hallux limitus bilaterally.     Assessment and plan- Diabetes with intermittent Neuropathy symptoms, his protective sensations intact.  His previous calluses are resolved.  Diagnosis and treatment options discussed with them.  Previous notes reviewed.  Prescription for diabetic shoes and inserts given and he is given the phone number addressed to orthotics and prosthetics for these.  Continue  Loprox cream as needed.  Return to clinic and see me in 6 months.                                                                     Low to moderate level of medical decision making.

## 2024-10-07 ENCOUNTER — LAB (OUTPATIENT)
Dept: LAB | Facility: CLINIC | Age: 49
End: 2024-10-07
Payer: COMMERCIAL

## 2024-10-07 DIAGNOSIS — E13.9 LADA (LATENT AUTOIMMUNE DIABETES MELLITUS IN ADULTS) (H): ICD-10-CM

## 2024-10-07 LAB
EST. AVERAGE GLUCOSE BLD GHB EST-MCNC: 166 MG/DL
HBA1C MFR BLD: 7.4 %

## 2024-10-07 PROCEDURE — 36415 COLL VENOUS BLD VENIPUNCTURE: CPT | Performed by: PATHOLOGY

## 2024-10-07 PROCEDURE — 83036 HEMOGLOBIN GLYCOSYLATED A1C: CPT | Performed by: PHYSICIAN ASSISTANT

## 2024-10-07 PROCEDURE — 99000 SPECIMEN HANDLING OFFICE-LAB: CPT | Performed by: PATHOLOGY

## 2024-10-14 ENCOUNTER — TRANSFERRED RECORDS (OUTPATIENT)
Dept: HEALTH INFORMATION MANAGEMENT | Facility: CLINIC | Age: 49
End: 2024-10-14
Payer: COMMERCIAL

## 2024-10-18 ENCOUNTER — TRANSFERRED RECORDS (OUTPATIENT)
Dept: HEALTH INFORMATION MANAGEMENT | Facility: CLINIC | Age: 49
End: 2024-10-18
Payer: COMMERCIAL

## 2024-11-08 ENCOUNTER — TRANSFERRED RECORDS (OUTPATIENT)
Dept: HEALTH INFORMATION MANAGEMENT | Facility: CLINIC | Age: 49
End: 2024-11-08
Payer: COMMERCIAL

## 2024-11-12 NOTE — PROGRESS NOTES
11/12/24 9:46 AM  PATIENT LAB/IMAGING STATUS : No pending lab orders   Patient is showing 4/5 MNCM met. Not on statin   Outcome for 11/18/24 9:29 AM: Data obtained via [id number] website  María Mosley CMA

## 2024-11-19 ENCOUNTER — LAB (OUTPATIENT)
Dept: LAB | Facility: CLINIC | Age: 49
End: 2024-11-19
Payer: COMMERCIAL

## 2024-11-19 ENCOUNTER — OFFICE VISIT (OUTPATIENT)
Dept: ENDOCRINOLOGY | Facility: CLINIC | Age: 49
End: 2024-11-19
Payer: COMMERCIAL

## 2024-11-19 VITALS
BODY MASS INDEX: 22.53 KG/M2 | OXYGEN SATURATION: 98 % | DIASTOLIC BLOOD PRESSURE: 82 MMHG | HEART RATE: 83 BPM | WEIGHT: 170 LBS | SYSTOLIC BLOOD PRESSURE: 129 MMHG | HEIGHT: 73 IN

## 2024-11-19 DIAGNOSIS — E10.42 DM TYPE 1 WITH DIABETIC PERIPHERAL NEUROPATHY (H): ICD-10-CM

## 2024-11-19 DIAGNOSIS — E10.42 DM TYPE 1 WITH DIABETIC PERIPHERAL NEUROPATHY (H): Primary | ICD-10-CM

## 2024-11-19 DIAGNOSIS — E13.9 LADA (LATENT AUTOIMMUNE DIABETES MELLITUS IN ADULTS) (H): ICD-10-CM

## 2024-11-19 LAB
ALBUMIN UR-MCNC: NEGATIVE MG/DL
APPEARANCE UR: CLEAR
BILIRUB UR QL STRIP: NEGATIVE
COLOR UR AUTO: ABNORMAL
GLUCOSE UR STRIP-MCNC: 300 MG/DL
HGB UR QL STRIP: NEGATIVE
KETONES UR STRIP-MCNC: NEGATIVE MG/DL
LEUKOCYTE ESTERASE UR QL STRIP: NEGATIVE
MUCOUS THREADS #/AREA URNS LPF: PRESENT /LPF
NITRATE UR QL: NEGATIVE
PH UR STRIP: 7 [PH] (ref 5–7)
RBC URINE: 2 /HPF
SP GR UR STRIP: 1.01 (ref 1–1.03)
UROBILINOGEN UR STRIP-MCNC: NORMAL MG/DL
WBC URINE: <1 /HPF

## 2024-11-19 PROCEDURE — 81001 URINALYSIS AUTO W/SCOPE: CPT | Performed by: PATHOLOGY

## 2024-11-19 RX ORDER — INSULIN GLARGINE 100 [IU]/ML
INJECTION, SOLUTION SUBCUTANEOUS
Qty: 20 ML | Refills: 4 | Status: SHIPPED | OUTPATIENT
Start: 2024-11-19

## 2024-11-19 ASSESSMENT — PAIN SCALES - GENERAL: PAINLEVEL_OUTOF10: NO PAIN (0)

## 2024-11-19 NOTE — LETTER
11/19/2024       RE: Piter CHENG Said  [address]  [facility] [address]     Dear Colleague,    Thank you for referring your patient, Piter Conway, to the [facility] ENDOCRINOLOGY CLINIC [place] at [facility]. Please see a copy of my visit note below.    11/12/24 9:46 AM  PATIENT LAB/IMAGING STATUS : No pending lab orders   Patient is showing 4/5 MNCM met. Not on statin   Outcome for 11/18/24 9:29 AM: Data obtained via [employer] website  BIRGIT Robertson  Piter Conway is a 49 year old male with type 1 diabetes mellitus.  Clinic visit today for diabetes follow up.  Last visit was with me in June 2024.  Pt was started on insulin in Nov 2019.    His EJ was + with C-peptide 0.8 ng/mL.  Piter reports having mild numbness in both feet.  He has been seen by Podiatry.  Patient has no hx of retinopathy or nephropathy.  For his diabetes, he is currently taking Lantus 15 units subcutaneous each am and Novolog 1 unit/15 gms CHO with meals with correction insulin.  Pt's A1C was 7.4 % on 10/7/2024.  Previous A1C was 7.4 % in June 2024.  His A1C was 10.6 % in 2014 time of diabetes diagnosis.  I reviewed and scanned his Freestyle Libre2 sensor download data in his note below.  His blood sugar values have been higher.  His average glucose 187.  Glucose in target range 47 % of the time.  On ROS today, less numbness in feet.  No foot ulcers.  He has gained weight since using insulin.  Nocturia.  Some mild dysuria.  No hematuria.  Recent URI which has resolved.  Pt denies blurred vision, n/v, SOB,cough, fever, chills, chest pain,abd pain or diarrhea at this time.    Diabetes Care  Retinopathy:none.  Reminded patient to see ophthalmology annually for diabetic eye exam and glaucoma check.    Nephropathy:none; pt's urine microalbuminuria negative in September 2023.  Neuropathy: mild numbness in both feet.  Foot Exam:no ulcers.  Taking aspirin:no  Lipids:   in September 2023.  CAD: no.  Mental health: dx of adjustment disorder with mixed anxiety and depressed mood during the COVID pandemic which has improved per patient.  Insulin: Basal and meal time insulin with correction scale.  Testing: Freestyle Libre2 sensor.                     ROS  Please see under HPI.    Allergies  No Known Allergies    Medications  Current Outpatient Medications   Medication Sig Dispense Refill     ACCU-CHEK GUIDE test strip TEST BLOOD SUGAR TWO TIMES A DAY OR AS DIRECTED 200 strip 2     ammonium lactate (AMLACTIN) 12 % external cream Apply topically daily To feet. 385 g 5     blood glucose monitoring (ACCU-CHEK FASTCLIX) lancets TEST TWO TIMES A  each 3     celecoxib (CELEBREX) 200 MG capsule Take 1 capsule (200 mg) by mouth daily 90 capsule 1     ciclopirox (LOPROX) 0.77 % cream Apply topically 2 times daily To feet. 90 g 5     Continuous Blood Gluc Sensor (FREESTYLE NAVEED 2 SENSOR) MISC Change every 14 days. 6 each 3     DiphenhydrAMINE HCl (BENADRYL ALLERGY PO) Take 1 tablet by mouth daily       fluticasone (FLONASE) 50 MCG/ACT nasal spray Spray 2 sprays into both nostrils daily 16 g 3     insulin aspart (NOVOLOG FLEXPEN) 100 UNIT/ML pen INJECT 1 UNIT UNDER THE SKIN PER 15 GRAMS OF CARBOHYDRATES, PLUS 1 ADDITIONAL UNIT FOR EVERY 50 OVER 150 OF BLOOD SUGAR (MAX DAILY DOSE 50 UNITS) 45 mL 1     insulin glargine (LANTUS SOLOSTAR) 100 UNIT/ML pen INJECT 16 to 18 UNITS SUBCUTANEOUS EVERY MORNING. Please do NOT fill today 11/19/2024. 20 mL 4     insulin pen needle (BD MICH U/F) 32G X 4 MM miscellaneous USE 4 PEN NEEDLES DAILY OR AS DIRECTED 400 each 3     omeprazole (PRILOSEC) 20 MG DR capsule Take 20 mg po twice daily 180 capsule 3     polyethylene glycol (MIRALAX) powder Take 17 g (1 capful) by mouth 2 times daily as needed for constipation 510 g 1     urea (GORMEL) 20 % external cream Apply topically daily To feet. 120 g 5       Family History  family history includes Aortic  "stenosis in his father; Autism Spectrum Disorder in his son; Diabetes in his brother, father, and mother; Hearing Loss in his son; Heart Disease (age of onset: 75) in his father; Heart Failure in his father; Hypertension in his father and mother; Osteoporosis in his mother; Other - See Comments in his brother and father.  He also has a younger brother with type 1 diabetes using an insulin pump.    Social History  Smoke: none.  ETOH: none.    Past Medical History    1. SAMANTA/DM 1 - dx 2014.  Past Medical History:   Diagnosis Date     Diabetes (H) 2014     History of hepatitis A 2001   Adjustment disorder with mixed anxiety and depressed mood.    Physical Exam    /82   Pulse 83   Ht 1.854 m (6' 1\")   Wt 77.1 kg (170 lb)   SpO2 98%   BMI 22.43 kg/m       Feet: No ulcers.    RESULTS  Creatinine   Date Value Ref Range Status   02/02/2024 0.82 0.67 - 1.17 mg/dL Final   11/28/2020 0.89 0.66 - 1.25 mg/dL Final     GFR Estimate   Date Value Ref Range Status   02/02/2024 >90 >60 mL/min/1.73m2 Final   11/28/2020 >90 >60 mL/min/[1.73_m2] Final     Comment:     Non  GFR Calc  Starting 12/18/2018, serum creatinine based estimated GFR (eGFR) will be   calculated using the Chronic Kidney Disease Epidemiology Collaboration   (CKD-EPI) equation.       Hemoglobin A1C   Date Value Ref Range Status   10/07/2024 7.4 (H) <5.7 % Final     Comment:     Normal <5.7%   Prediabetes 5.7-6.4%    Diabetes 6.5% or higher     Note: Adopted from ADA consensus guidelines.   06/30/2021 7.3 (H) 0 - 5.6 % Final     Comment:     Normal <5.7% Prediabetes 5.7-6.4%  Diabetes 6.5% or higher - adopted from ADA   consensus guidelines.       Potassium   Date Value Ref Range Status   02/02/2024 4.3 3.4 - 5.3 mmol/L Final   11/28/2020 4.5 3.4 - 5.3 mmol/L Final     ALT   Date Value Ref Range Status   11/28/2020 49 0 - 70 U/L Final     AST   Date Value Ref Range Status   09/27/2023 17 0 - 45 U/L Final     Comment:     Reference intervals " for this test were updated on 6/12/2023 to more accurately reflect our healthy population. There may be differences in the flagging of prior results with similar values performed with this method. Interpretation of those prior results can be made in the context of the updated reference intervals.   11/28/2020 11 0 - 45 U/L Final     TSH   Date Value Ref Range Status   02/02/2024 2.22 0.30 - 4.20 uIU/mL Final   09/21/2022 1.39 0.40 - 4.00 mU/L Final   06/30/2021 1.19 0.40 - 4.00 mU/L Final     T4 Free   Date Value Ref Range Status   12/11/2017 1.12 0.76 - 1.46 ng/dL Final       Cholesterol   Date Value Ref Range Status   09/27/2023 164 <200 mg/dL Final   09/21/2022 157 <200 mg/dL Final   11/28/2020 159 <200 mg/dL Final   09/17/2020 154.0 0.0 - 200.0 Final     HDL Cholesterol   Date Value Ref Range Status   11/28/2020 43 >39 mg/dL Final   09/17/2020 46.0 >40.0 Final     Direct Measure HDL   Date Value Ref Range Status   09/27/2023 50 >=40 mg/dL Final   09/21/2022 43 >=40 mg/dL Final     LDL Cholesterol Calculated   Date Value Ref Range Status   09/27/2023 102 (H) <=100 mg/dL Final   09/21/2022 102 (H) <=100 mg/dL Final   11/28/2020 103 (H) <100 mg/dL Final     Comment:     Above desirable:  100-129 mg/dl  Borderline High:  130-159 mg/dL  High:             160-189 mg/dL  Very high:       >189 mg/dl     02/27/2020 82 <100 mg/dL Final     Comment:     Desirable:       <100 mg/dl     LDL Cholesterol Direct   Date Value Ref Range Status   09/17/2020 86.0 0.0 - 129.0 Final     Triglycerides   Date Value Ref Range Status   09/27/2023 59 <150 mg/dL Final   09/21/2022 61 <150 mg/dL Final   11/28/2020 68 <150 mg/dL Final   09/17/2020 107.0 0.0 - 150.0 Final     Cholesterol/HDL Ratio   Date Value Ref Range Status   09/17/2020 3.3 0.0 - 5.0 Final   08/27/2015 3.0 0.0 - 5.0 Final       ASSESSMENT/PLAN:    1.  TYPE 1 DIABETES MELLITUS:  Piter had a + EJ antibody and C-peptide 0.8 ng/mL with glucose 144 in Nov 2019.  He started  taking insulin in Nov 2019.   Patient's blood sugar values have been higher.  He may have less endogenous insulin production at this time.  He did report nocturia and mild dysuria.  UA/UCX ordered today.  Increase Lantus 16 units subcutaneous daily and if fasting blood sugar value remains > 130 patient is to increase Lantus to 17 units.  Reminded him to take Novolog for all food intake and to take bolus insulin before eating.  He is to continue to monitor his blood sugar using his Freestyle Libre2 sensor.  Pt's urine microalbuminuria was negative in Sept 2023. Creat 0.82 with GFR > 90 mL/min in 2/2024.   Piter was seen by [facility] with no evidence of retinopathy.   He reports less numbness in both feet. Seen by Podiatry.  Pt's LDL was 102 in 9/2022. He has worked on reducing fat in his diet. Declines statin.  /82 today.    2.  HEALTH MAINTENANCE: Reminded patient to see primary care provider annually for health maintenance.    3. FOLLOW UP: with me in clinic in 4 months.    UA/UCX ordered today.  A1C ordered today- to be done after 1/7/2025.      Time spent reviewing chart, labs and Freestyle Libre2 sensor download data  today = 5 minutes.  Time for clinic visit today =  20  minutes.  Time for documentation today = 10 minutes.    TOTAL TIME FOR VISIT TODAY = 35   minutes.    Sheila Brewer PA-C        Again, thank you for allowing me to participate in the care of your patient.      Sincerely,    Sheila Brewer PA-C

## 2024-11-19 NOTE — PROGRESS NOTES
HPI  Piter Conway is a 49 year old male with type 1 diabetes mellitus.  Clinic visit today for diabetes follow up.  Last visit was with me in June 2024.  Pt was started on insulin in Nov 2019.    His EJ was + with C-peptide 0.8 ng/mL.  Piter reports having mild numbness in both feet.  He has been seen by Podiatry.  Patient has no hx of retinopathy or nephropathy.  For his diabetes, he is currently taking Lantus 15 units subcutaneous each am and Novolog 1 unit/15 gms CHO with meals with correction insulin.  Pt's A1C was 7.4 % on 10/7/2024.  Previous A1C was 7.4 % in June 2024.  His A1C was 10.6 % in 2014 time of diabetes diagnosis.  I reviewed and scanned his Freestyle Libre2 sensor download data in his note below.  His blood sugar values have been higher.  His average glucose 187.  Glucose in target range 47 % of the time.  On ROS today, less numbness in feet.  No foot ulcers.  He has gained weight since using insulin.  Nocturia.  Some mild dysuria.  No hematuria.  Recent URI which has resolved.  Pt denies blurred vision, n/v, SOB,cough, fever, chills, chest pain,abd pain or diarrhea at this time.    Diabetes Care  Retinopathy:none.  Reminded patient to see ophthalmology annually for diabetic eye exam and glaucoma check.    Nephropathy:none; pt's urine microalbuminuria negative in September 2023.  Neuropathy: mild numbness in both feet.  Foot Exam:no ulcers.  Taking aspirin:no  Lipids:  in September 2023.  CAD: no.  Mental health: dx of adjustment disorder with mixed anxiety and depressed mood during the COVID pandemic which has improved per patient.  Insulin: Basal and meal time insulin with correction scale.  Testing: Freestyle Libre2 sensor.                     ROS  Please see under HPI.    Allergies  No Known Allergies    Medications  Current Outpatient Medications   Medication Sig Dispense Refill    ACCU-CHEK GUIDE test strip TEST BLOOD SUGAR TWO TIMES A DAY OR AS DIRECTED 200 strip 2    ammonium lactate  (AMLACTIN) 12 % external cream Apply topically daily To feet. 385 g 5    blood glucose monitoring (ACCU-CHEK FASTCLIX) lancets TEST TWO TIMES A  each 3    celecoxib (CELEBREX) 200 MG capsule Take 1 capsule (200 mg) by mouth daily 90 capsule 1    ciclopirox (LOPROX) 0.77 % cream Apply topically 2 times daily To feet. 90 g 5    Continuous Blood Gluc Sensor (FREESTYLE NAVEED 2 SENSOR) MISC Change every 14 days. 6 each 3    DiphenhydrAMINE HCl (BENADRYL ALLERGY PO) Take 1 tablet by mouth daily      fluticasone (FLONASE) 50 MCG/ACT nasal spray Spray 2 sprays into both nostrils daily 16 g 3    insulin aspart (NOVOLOG FLEXPEN) 100 UNIT/ML pen INJECT 1 UNIT UNDER THE SKIN PER 15 GRAMS OF CARBOHYDRATES, PLUS 1 ADDITIONAL UNIT FOR EVERY 50 OVER 150 OF BLOOD SUGAR (MAX DAILY DOSE 50 UNITS) 45 mL 1    insulin glargine (LANTUS SOLOSTAR) 100 UNIT/ML pen INJECT 16 to 18 UNITS SUBCUTANEOUS EVERY MORNING. Please do NOT fill today 11/19/2024. 20 mL 4    insulin pen needle (BD MICH U/F) 32G X 4 MM miscellaneous USE 4 PEN NEEDLES DAILY OR AS DIRECTED 400 each 3    omeprazole (PRILOSEC) 20 MG DR capsule Take 20 mg po twice daily 180 capsule 3    polyethylene glycol (MIRALAX) powder Take 17 g (1 capful) by mouth 2 times daily as needed for constipation 510 g 1    urea (GORMEL) 20 % external cream Apply topically daily To feet. 120 g 5       Family History  family history includes Aortic stenosis in his father; Autism Spectrum Disorder in his son; Diabetes in his brother, father, and mother; Hearing Loss in his son; Heart Disease (age of onset: 75) in his father; Heart Failure in his father; Hypertension in his father and mother; Osteoporosis in his mother; Other - See Comments in his brother and father.  He also has a younger brother with type 1 diabetes using an insulin pump.    Social History  Smoke: none.  ETOH: none.    Past Medical History    1. SAMANTA/DM 1 - dx 2014.  Past Medical History:   Diagnosis Date    Diabetes (H) 2014  "   History of hepatitis A 2001   Adjustment disorder with mixed anxiety and depressed mood.    Physical Exam    /82   Pulse 83   Ht 1.854 m (6' 1\")   Wt 77.1 kg (170 lb)   SpO2 98%   BMI 22.43 kg/m       Feet: No ulcers.    RESULTS  Creatinine   Date Value Ref Range Status   02/02/2024 0.82 0.67 - 1.17 mg/dL Final   11/28/2020 0.89 0.66 - 1.25 mg/dL Final     GFR Estimate   Date Value Ref Range Status   02/02/2024 >90 >60 mL/min/1.73m2 Final   11/28/2020 >90 >60 mL/min/[1.73_m2] Final     Comment:     Non  GFR Calc  Starting 12/18/2018, serum creatinine based estimated GFR (eGFR) will be   calculated using the Chronic Kidney Disease Epidemiology Collaboration   (CKD-EPI) equation.       Hemoglobin A1C   Date Value Ref Range Status   10/07/2024 7.4 (H) <5.7 % Final     Comment:     Normal <5.7%   Prediabetes 5.7-6.4%    Diabetes 6.5% or higher     Note: Adopted from ADA consensus guidelines.   06/30/2021 7.3 (H) 0 - 5.6 % Final     Comment:     Normal <5.7% Prediabetes 5.7-6.4%  Diabetes 6.5% or higher - adopted from ADA   consensus guidelines.       Potassium   Date Value Ref Range Status   02/02/2024 4.3 3.4 - 5.3 mmol/L Final   11/28/2020 4.5 3.4 - 5.3 mmol/L Final     ALT   Date Value Ref Range Status   11/28/2020 49 0 - 70 U/L Final     AST   Date Value Ref Range Status   09/27/2023 17 0 - 45 U/L Final     Comment:     Reference intervals for this test were updated on 6/12/2023 to more accurately reflect our healthy population. There may be differences in the flagging of prior results with similar values performed with this method. Interpretation of those prior results can be made in the context of the updated reference intervals.   11/28/2020 11 0 - 45 U/L Final     TSH   Date Value Ref Range Status   02/02/2024 2.22 0.30 - 4.20 uIU/mL Final   09/21/2022 1.39 0.40 - 4.00 mU/L Final   06/30/2021 1.19 0.40 - 4.00 mU/L Final     T4 Free   Date Value Ref Range Status   12/11/2017 1.12 " 0.76 - 1.46 ng/dL Final       Cholesterol   Date Value Ref Range Status   09/27/2023 164 <200 mg/dL Final   09/21/2022 157 <200 mg/dL Final   11/28/2020 159 <200 mg/dL Final   09/17/2020 154.0 0.0 - 200.0 Final     HDL Cholesterol   Date Value Ref Range Status   11/28/2020 43 >39 mg/dL Final   09/17/2020 46.0 >40.0 Final     Direct Measure HDL   Date Value Ref Range Status   09/27/2023 50 >=40 mg/dL Final   09/21/2022 43 >=40 mg/dL Final     LDL Cholesterol Calculated   Date Value Ref Range Status   09/27/2023 102 (H) <=100 mg/dL Final   09/21/2022 102 (H) <=100 mg/dL Final   11/28/2020 103 (H) <100 mg/dL Final     Comment:     Above desirable:  100-129 mg/dl  Borderline High:  130-159 mg/dL  High:             160-189 mg/dL  Very high:       >189 mg/dl     02/27/2020 82 <100 mg/dL Final     Comment:     Desirable:       <100 mg/dl     LDL Cholesterol Direct   Date Value Ref Range Status   09/17/2020 86.0 0.0 - 129.0 Final     Triglycerides   Date Value Ref Range Status   09/27/2023 59 <150 mg/dL Final   09/21/2022 61 <150 mg/dL Final   11/28/2020 68 <150 mg/dL Final   09/17/2020 107.0 0.0 - 150.0 Final     Cholesterol/HDL Ratio   Date Value Ref Range Status   09/17/2020 3.3 0.0 - 5.0 Final   08/27/2015 3.0 0.0 - 5.0 Final       ASSESSMENT/PLAN:    1.  TYPE 1 DIABETES MELLITUS:  Piter had a + EJ antibody and C-peptide 0.8 ng/mL with glucose 144 in Nov 2019.  He started taking insulin in Nov 2019.   Patient's blood sugar values have been higher.  He may have less endogenous insulin production at this time.  He did report nocturia and mild dysuria.  UA/UCX ordered today.  Increase Lantus 16 units subcutaneous daily and if fasting blood sugar value remains > 130 patient is to increase Lantus to 17 units.  Reminded him to take Novolog for all food intake and to take bolus insulin before eating.  He is to continue to monitor his blood sugar using his Freestyle Libre2 sensor.  Pt's urine microalbuminuria was negative in  Sept 2023. Creat 0.82 with GFR > 90 mL/min in 2/2024.   Piter was seen by [facility] with no evidence of retinopathy.   He reports less numbness in both feet. Seen by Podiatry.  Pt's LDL was 102 in 9/2022. He has worked on reducing fat in his diet. Declines statin.  /82 today.    2.  HEALTH MAINTENANCE: Reminded patient to see primary care provider annually for health maintenance.    3. FOLLOW UP: with me in clinic in 4 months.    UA/UCX ordered today.  A1C ordered today- to be done after 1/7/2025.      Time spent reviewing chart, labs and Freestyle Libre2 sensor download data  today = 5 minutes.  Time for clinic visit today =  20  minutes.  Time for documentation today = 10 minutes.    TOTAL TIME FOR VISIT TODAY = 35   minutes.    Sheila Brewer PA-C

## 2025-02-01 DIAGNOSIS — E13.9 LADA (LATENT AUTOIMMUNE DIABETES MELLITUS IN ADULTS) (H): ICD-10-CM

## 2025-02-01 RX ORDER — INSULIN ASPART 100 [IU]/ML
INJECTION, SOLUTION INTRAVENOUS; SUBCUTANEOUS
Qty: 45 ML | Refills: 3 | Status: SHIPPED | OUTPATIENT
Start: 2025-02-01

## 2025-02-02 NOTE — TELEPHONE ENCOUNTER
insulin aspart (NOVOLOG FLEXPEN) 100 UNIT/ML pen   Start: 02/01/2025   Disp-45 mL, R-3    Receipt confirmed by pharmacy (2/1/2025 12:20 PM CST)     Sheila Brewer PA-C  Endocrinology, Diabetes, and Metabolism  Lv  11/19/24  Nv  3/25/25    Routed because:  endo triage to manage. Sent 2/1/25. Sent for you to review.

## 2025-02-03 RX ORDER — INSULIN ASPART 100 [IU]/ML
INJECTION, SOLUTION INTRAVENOUS; SUBCUTANEOUS
Qty: 60 ML | Refills: 1 | Status: SHIPPED | OUTPATIENT
Start: 2025-02-03

## 2025-03-02 ENCOUNTER — HEALTH MAINTENANCE LETTER (OUTPATIENT)
Age: 50
End: 2025-03-02

## 2025-03-27 ENCOUNTER — OFFICE VISIT (OUTPATIENT)
Dept: ENDOCRINOLOGY | Facility: CLINIC | Age: 50
End: 2025-03-27
Payer: COMMERCIAL

## 2025-03-27 DIAGNOSIS — E10.42 DM TYPE 1 WITH DIABETIC PERIPHERAL NEUROPATHY (H): Primary | ICD-10-CM

## 2025-03-27 DIAGNOSIS — I87.8 VENOUS STASIS: ICD-10-CM

## 2025-03-27 NOTE — LETTER
3/27/2025       RE: Piter CHENG Said  [address] [place] [address] [place] [id number]     Dear Colleague,    Thank you for referring your patient, Piter CHENG Said, to the [facility] [place] at [facility]. Please see a copy of my visit note below.    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 OR     ESOPHAGOSCOPY, GASTROSCOPY, DUODENOSCOPY (EGD), COMBINED N/A 1/3/2023    Procedure: ESOPHAGOGASTRODUODENOSCOPY (EGD);  Surgeon: Sarah Stafford MD;  Location: [facility] OR     NO HISTORY OF SURGERY       Social History     Socioeconomic History     Marital status:      Spouse name: Not on file     Number of children: 3     Years of education: Not on file     Highest education level: Not on file   Occupational History     Occupation: medical transport   Tobacco Use     Smoking status: Never     Smokeless tobacco: Never   Vaping Use     Vaping status: Never Used   Substance and Sexual Activity     Alcohol use: No     Drug use: No     Sexual activity: Yes     Partners: Female     Comment:    Other Topics Concern     Parent/sibling w/ CABG, MI or angioplasty before 65F 55M? Not Asked    Social History Narrative     Not on file     Social Drivers of Health     Financial Resource Strain: Low Risk  (2/2/2024)    Financial Resource Strain      Within the past 12 months, have you or your family members you live with been unable to get utilities (heat, electricity) when it was really needed?: No   Food Insecurity: Low Risk  (2/2/2024)    Food Insecurity      Within the past 12 months, did you worry that your food would run out before you got money to buy more?: No      Within the past 12 months, did the food you bought just not last and you didn t have money to get more?: No   Transportation Needs: Low Risk  (2/2/2024)    Transportation Needs      Within the past 12 months, has lack of transportation kept you from medical appointments, getting your medicines, non-medical meetings or appointments, work, or from getting things that you need?: No   Physical Activity: Insufficiently Active (2/2/2024)    Exercise Vital Sign      Days of Exercise per Week: 3 days      Minutes of Exercise per Session: 30 min   Stress: No Stress Concern Present (2/2/2024)    [place] [employer] - Occupational Stress Questionnaire      Feeling of Stress : Only a little   Social Connections: Unknown (2/2/2024)    Social Connection and Isolation Panel [NHANES]      Frequency of Communication with Friends and Family: Not on file      Frequency of Social Gatherings with Friends and Family: Once a week      Attends Jainism Services: Not on file      Active Member of Clubs or Organizations: Not on file      Attends Club or Organization Meetings: Not on file      Marital Status: Not on file   Interpersonal Safety: Low Risk  (2/2/2024)    Interpersonal Safety      Do you feel physically and emotionally safe where you currently live?: Yes      Within the past 12 months, have you been hit, slapped, kicked or otherwise physically hurt by someone?: No      Within the past 12 months, have you been humiliated or  emotionally abused in other ways by your partner or ex-partner?: No   Housing Stability: Low Risk  (2/2/2024)    Housing Stability      Do you have housing? : Yes      Are you worried about losing your housing?: No     Family History   Problem Relation Age of Onset     Osteoporosis Mother      Diabetes Mother      Hypertension Mother      Hypertension Father      Diabetes Father      Heart Disease Father 75     Heart Failure Father      Aortic stenosis Father      Other - See Comments Father         enlarged prostate?     Diabetes Brother      Other - See Comments Brother         mental health     Hearing Loss Son         cochlear implant     Autism Spectrum Disorder Son      Glaucoma No family hx of      Macular Degeneration No family hx of              Lab Results   Component Value Date    A1C 7.4 10/07/2024    A1C 7.4 06/18/2024    A1C 6.8 02/02/2024    A1C 7.1 09/27/2023    A1C 7.4 03/27/2023    A1C 7.9 09/21/2022    A1C 7.3 06/30/2021    A1C 7.2 11/28/2020    A1C 7.6 09/17/2020    A1C 6.9 03/04/2019    A1C 6.3 12/11/2017         Subjective findings- 50-year-old returns clinic for Diabetes with peripheral neuropathy.  Relates his feet are doing well.  Relates he has had a couple episodes of numbness and tingling in his feet that correlated with his blood sugars being high.  Relates to no ulcers, no sores, no injuries since we seen him last.  Relates he did get his Diabetic shoes earlier this year and those are okay.    Objective findings- DP and PT are 2 out of 4 bilaterally.  Has dorsally contracted digits 2 through 5 bilaterally.  Has mild functional Hallux Limitus bilaterally.  Has varicosities with minimal peripheral edema bilaterally.  Sharp dull is intact with 5.07 [place] Malissa monofilament bilaterally.  Proprioception is intact bilaterally.  Deep tendon reflex are intact bilaterally.  No tendon voids bilaterally.  There is no erythema, no drainage, no odor, no calor, no pain on palpation  bilaterally.    Assessment and plan- Diabetes with peripheral Neuropathy symptoms, protective sensation is intact.  Venous stasis with varicosities bilaterally.  Diagnosis and treatment options discussed with the patient.  Diabetic foot cares discussed with the patient.  Prescription for compression socks 15 to 20 mmHg given and use discussed with the patient.  Continue diabetic shoes.  Previous notes reviewed.  Return to clinic and see me in 6 months.                            Low to moderate level of medical decision making.      Again, thank you for allowing me to participate in the care of your patient.      Sincerely,    Yazan Parker DPM

## 2025-03-27 NOTE — PROGRESS NOTES
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)    Upper back pain    Cervicalgia    Acute low back pain with radicular symptoms, duration less than 6 weeks    Chronic bilateral low back pain with left-sided sciatica    Bilateral low back pain without sciatica    Lateral epicondylitis of both elbows    Tension headache    Lumbar disc herniation     Past Surgical History:   Procedure Laterality Date    ESOPHAGOSCOPY, GASTROSCOPY, DUODENOSCOPY (EGD), COMBINED N/A 12/1/2020    Procedure: ESOPHAGOGASTRODUODENOSCOPY, WITH BIOPSY;  Surgeon: Fabio Mota DO;  Location: [facility] OR    ESOPHAGOSCOPY, GASTROSCOPY, DUODENOSCOPY (EGD), COMBINED N/A 1/3/2023    Procedure: ESOPHAGOGASTRODUODENOSCOPY (EGD);  Surgeon: Sarah Stafford MD;  Location: [facility] OR    NO HISTORY OF SURGERY       Social History     Socioeconomic History    Marital status:      Spouse name: Not on file    Number of children: 3    Years of education: Not on file    Highest education level: Not on file   Occupational History    Occupation: medical transport   Tobacco Use    Smoking status: Never    Smokeless tobacco: Never   Vaping Use    Vaping status: Never Used   Substance and Sexual Activity    Alcohol use: No    Drug use: No    Sexual activity: Yes     Partners: Female     Comment:    Other Topics Concern    Parent/sibling w/ CABG, MI or angioplasty before 65F 55M? Not Asked   Social History Narrative    Not on file     Social Drivers of Health     Financial Resource Strain: Low Risk  (2/2/2024)    Financial Resource Strain     Within the past 12 months, have you or your family members you live with been unable to get utilities (heat, electricity) when it was really needed?: No   Food Insecurity: Low Risk   (2/2/2024)    Food Insecurity     Within the past 12 months, did you worry that your food would run out before you got money to buy more?: No     Within the past 12 months, did the food you bought just not last and you didn t have money to get more?: No   Transportation Needs: Low Risk  (2/2/2024)    Transportation Needs     Within the past 12 months, has lack of transportation kept you from medical appointments, getting your medicines, non-medical meetings or appointments, work, or from getting things that you need?: No   Physical Activity: Insufficiently Active (2/2/2024)    Exercise Vital Sign     Days of Exercise per Week: 3 days     Minutes of Exercise per Session: 30 min   Stress: No Stress Concern Present (2/2/2024)    [place] [employer] - Occupational Stress Questionnaire     Feeling of Stress : Only a little   Social Connections: Unknown (2/2/2024)    Social Connection and Isolation Panel [NHANES]     Frequency of Communication with Friends and Family: Not on file     Frequency of Social Gatherings with Friends and Family: Once a week     Attends Bahai Services: Not on file     Active Member of Clubs or Organizations: Not on file     Attends Club or Organization Meetings: Not on file     Marital Status: Not on file   Interpersonal Safety: Low Risk  (2/2/2024)    Interpersonal Safety     Do you feel physically and emotionally safe where you currently live?: Yes     Within the past 12 months, have you been hit, slapped, kicked or otherwise physically hurt by someone?: No     Within the past 12 months, have you been humiliated or emotionally abused in other ways by your partner or ex-partner?: No   Housing Stability: Low Risk  (2/2/2024)    Housing Stability     Do you have housing? : Yes     Are you worried about losing your housing?: No     Family History   Problem Relation Age of Onset    Osteoporosis Mother     Diabetes Mother     Hypertension Mother     Hypertension Father      Diabetes Father     Heart Disease Father 75    Heart Failure Father     Aortic stenosis Father     Other - See Comments Father         enlarged prostate?    Diabetes Brother     Other - See Comments Brother         mental health    Hearing Loss Son         cochlear implant    Autism Spectrum Disorder Son     Glaucoma No family hx of     Macular Degeneration No family hx of              Lab Results   Component Value Date    A1C 7.4 10/07/2024    A1C 7.4 06/18/2024    A1C 6.8 02/02/2024    A1C 7.1 09/27/2023    A1C 7.4 03/27/2023    A1C 7.9 09/21/2022    A1C 7.3 06/30/2021    A1C 7.2 11/28/2020    A1C 7.6 09/17/2020    A1C 6.9 03/04/2019    A1C 6.3 12/11/2017         Subjective findings- 50-year-old returns clinic for Diabetes with peripheral neuropathy.  Relates his feet are doing well.  Relates he has had a couple episodes of numbness and tingling in his feet that correlated with his blood sugars being high.  Relates to no ulcers, no sores, no injuries since we seen him last.  Relates he did get his Diabetic shoes earlier this year and those are okay.    Objective findings- DP and PT are 2 out of 4 bilaterally.  Has dorsally contracted digits 2 through 5 bilaterally.  Has mild functional Hallux Limitus bilaterally.  Has varicosities with minimal peripheral edema bilaterally.  Sharp dull is intact with 5.07 [place] Malissa monofilament bilaterally.  Proprioception is intact bilaterally.  Deep tendon reflex are intact bilaterally.  No tendon voids bilaterally.  There is no erythema, no drainage, no odor, no calor, no pain on palpation bilaterally.    Assessment and plan- Diabetes with peripheral Neuropathy symptoms, protective sensation is intact.  Venous stasis with varicosities bilaterally.  Diagnosis and treatment options discussed with the patient.  Diabetic foot cares discussed with the patient.  Prescription for compression socks 15 to 20 mmHg given and use discussed with the patient.  Continue diabetic shoes.   Previous notes reviewed.  Return to clinic and see me in 6 months.                            Low to moderate level of medical decision making.

## 2025-04-19 ENCOUNTER — HEALTH MAINTENANCE LETTER (OUTPATIENT)
Age: 50
End: 2025-04-19

## 2025-05-01 ENCOUNTER — TELEPHONE (OUTPATIENT)
Dept: ENDOCRINOLOGY | Facility: CLINIC | Age: 50
End: 2025-05-01
Payer: COMMERCIAL

## 2025-05-01 NOTE — TELEPHONE ENCOUNTER
Left Voicemail (1st Attempt) and Sent Mychart (1st Attempt) for the patient to call back and schedule the following:    Appointment type: RETURN DIABETES  Provider: Osman Mathis, Joseph, or Byron  Return date: next available, NEGRA OK as this is his 2nd r/s since he was supposed to be seen In March  Specialty phone number: [phone]  Additional appointment(s) needed: Lab appt for A1c 1-3d prior to next appt  Additonal Notes: Elbert GALLEGOS    Available appts:  Delfina 06/03 in person [facility]  Delfina 07/08 10:00 virtual [facility]  Delfina 07/15 12:00 in person [facility]  Osman 07/16 12:30 virtual [facility]    Please note that the above appointment(s) will require manual scheduling as they are marked as NEGRA and will not appear using auto search. Do not schedule the patient if another patient has already been scheduled in the requested appointment slot.     Madina Monroe on 5/1/2025 at 11:01 AM

## 2025-05-04 DIAGNOSIS — E10.65 TYPE 1 DIABETES MELLITUS WITH HYPERGLYCEMIA (H): ICD-10-CM

## 2025-05-06 NOTE — TELEPHONE ENCOUNTER
Last Written Prescription:  Continuous Blood Gluc Sensor (FREESTYLE NAVEED 2 SENSOR) [facility] 6 each 3 4/1/2024     ----------------------  Last Visit Date: 11/19/24  Future Visit Date: 5/9/25  ----------------------      Refill decision: Medication refilled per  Medication Refill in Ambulatory Care  policy.      Request from pharmacy:  Requested Prescriptions   Pending Prescriptions Disp Refills    Continuous Glucose Sensor (FREESTYLE NAVEED 2 SENSOR) [facility] [Pharmacy Med Name: FREESTYLE NAVEED 2 SENSOR  [facility]]  3     Sig: APPLY 1 SENSOR AND CHANGE EVERY 14 DAYS AS DIRECTED       There is no refill protocol information for this order         Negative/Unknown

## 2025-05-14 NOTE — PROGRESS NOTES
05/14/25 1:08 PM : Appointment reminder phone call made to patient. Pt verbalized understanding

## 2025-05-15 ENCOUNTER — OFFICE VISIT (OUTPATIENT)
Dept: ENDOCRINOLOGY | Facility: CLINIC | Age: 50
End: 2025-05-15
Payer: COMMERCIAL

## 2025-05-15 VITALS
BODY MASS INDEX: 22.82 KG/M2 | SYSTOLIC BLOOD PRESSURE: 138 MMHG | HEART RATE: 85 BPM | OXYGEN SATURATION: 98 % | WEIGHT: 173 LBS | DIASTOLIC BLOOD PRESSURE: 86 MMHG

## 2025-05-15 DIAGNOSIS — E10.9 TYPE 1 DIABETES MELLITUS WITHOUT COMPLICATION (H): Primary | ICD-10-CM

## 2025-05-15 LAB
ALBUMIN SERPL BCG-MCNC: 4.3 G/DL (ref 3.5–5.2)
ALP SERPL-CCNC: 128 U/L (ref 40–150)
ALT SERPL W P-5'-P-CCNC: 14 U/L (ref 0–70)
ANION GAP SERPL CALCULATED.3IONS-SCNC: 9 MMOL/L (ref 7–15)
AST SERPL W P-5'-P-CCNC: 19 U/L (ref 0–45)
BASOPHILS # BLD AUTO: 0 10E3/UL (ref 0–0.2)
BASOPHILS NFR BLD AUTO: 1 %
BILIRUB SERPL-MCNC: 0.3 MG/DL
BUN SERPL-MCNC: 10.9 MG/DL (ref 6–20)
CALCIUM SERPL-MCNC: 9.1 MG/DL (ref 8.8–10.4)
CHLORIDE SERPL-SCNC: 102 MMOL/L (ref 98–107)
CHOLEST SERPL-MCNC: 136 MG/DL
CREAT SERPL-MCNC: 0.84 MG/DL (ref 0.67–1.17)
CREAT UR-MCNC: 60 MG/DL
EGFRCR SERPLBLD CKD-EPI 2021: >90 ML/MIN/1.73M2
EOSINOPHIL # BLD AUTO: 0.2 10E3/UL (ref 0–0.7)
EOSINOPHIL NFR BLD AUTO: 4 %
ERYTHROCYTE [DISTWIDTH] IN BLOOD BY AUTOMATED COUNT: 12.9 % (ref 10–15)
EST. AVERAGE GLUCOSE BLD GHB EST-MCNC: 183 MG/DL
FASTING STATUS PATIENT QL REPORTED: YES
FASTING STATUS PATIENT QL REPORTED: YES
GLUCOSE SERPL-MCNC: 189 MG/DL (ref 70–99)
HBA1C MFR BLD: 8 %
HCO3 SERPL-SCNC: 25 MMOL/L (ref 22–29)
HCT VFR BLD AUTO: 45.9 % (ref 40–53)
HDLC SERPL-MCNC: 47 MG/DL
HGB BLD-MCNC: 15.3 G/DL (ref 13.3–17.7)
IMM GRANULOCYTES # BLD: 0 10E3/UL
IMM GRANULOCYTES NFR BLD: 0 %
LDLC SERPL CALC-MCNC: 80 MG/DL
LYMPHOCYTES # BLD AUTO: 1.4 10E3/UL (ref 0.8–5.3)
LYMPHOCYTES NFR BLD AUTO: 38 %
MCH RBC QN AUTO: 30.7 PG (ref 26.5–33)
MCHC RBC AUTO-ENTMCNC: 33.3 G/DL (ref 31.5–36.5)
MCV RBC AUTO: 92 FL (ref 78–100)
MICROALBUMIN UR-MCNC: <12 MG/L
MICROALBUMIN/CREAT UR: NORMAL MG/G{CREAT}
MONOCYTES # BLD AUTO: 0.4 10E3/UL (ref 0–1.3)
MONOCYTES NFR BLD AUTO: 10 %
NEUTROPHILS # BLD AUTO: 1.7 10E3/UL (ref 1.6–8.3)
NEUTROPHILS NFR BLD AUTO: 46 %
NONHDLC SERPL-MCNC: 89 MG/DL
NRBC # BLD AUTO: 0 10E3/UL
NRBC BLD AUTO-RTO: 0 /100
PLATELET # BLD AUTO: 181 10E3/UL (ref 150–450)
POTASSIUM SERPL-SCNC: 4.5 MMOL/L (ref 3.4–5.3)
PROT SERPL-MCNC: 7.1 G/DL (ref 6.4–8.3)
RBC # BLD AUTO: 4.99 10E6/UL (ref 4.4–5.9)
SODIUM SERPL-SCNC: 136 MMOL/L (ref 135–145)
TRIGL SERPL-MCNC: 45 MG/DL
WBC # BLD AUTO: 3.6 10E3/UL (ref 4–11)

## 2025-05-15 PROCEDURE — 36415 COLL VENOUS BLD VENIPUNCTURE: CPT | Performed by: PATHOLOGY

## 2025-05-15 PROCEDURE — 83036 HEMOGLOBIN GLYCOSYLATED A1C: CPT | Performed by: PATHOLOGY

## 2025-05-15 PROCEDURE — 82570 ASSAY OF URINE CREATININE: CPT | Performed by: INTERNAL MEDICINE

## 2025-05-15 PROCEDURE — 3075F SYST BP GE 130 - 139MM HG: CPT | Performed by: INTERNAL MEDICINE

## 2025-05-15 PROCEDURE — 80053 COMPREHEN METABOLIC PANEL: CPT | Performed by: PATHOLOGY

## 2025-05-15 PROCEDURE — 85025 COMPLETE CBC W/AUTO DIFF WBC: CPT | Performed by: PATHOLOGY

## 2025-05-15 PROCEDURE — 1126F AMNT PAIN NOTED NONE PRSNT: CPT | Performed by: INTERNAL MEDICINE

## 2025-05-15 PROCEDURE — 99000 SPECIMEN HANDLING OFFICE-LAB: CPT | Performed by: PATHOLOGY

## 2025-05-15 PROCEDURE — 3079F DIAST BP 80-89 MM HG: CPT | Performed by: INTERNAL MEDICINE

## 2025-05-15 PROCEDURE — 99214 OFFICE O/P EST MOD 30 MIN: CPT | Mod: GC | Performed by: INTERNAL MEDICINE

## 2025-05-15 PROCEDURE — G2211 COMPLEX E/M VISIT ADD ON: HCPCS | Performed by: INTERNAL MEDICINE

## 2025-05-15 PROCEDURE — 80061 LIPID PANEL: CPT | Performed by: PATHOLOGY

## 2025-05-15 RX ORDER — INSULIN ASPART 100 [IU]/ML
1 INJECTION, SOLUTION INTRAVENOUS; SUBCUTANEOUS
Qty: 15 ML | Refills: 3 | Status: SHIPPED | OUTPATIENT
Start: 2025-05-15 | End: 2025-05-15

## 2025-05-15 RX ORDER — INSULIN GLARGINE 100 [IU]/ML
17 INJECTION, SOLUTION SUBCUTANEOUS EVERY MORNING
Qty: 15 ML | Refills: 3 | Status: SHIPPED | OUTPATIENT
Start: 2025-05-15

## 2025-05-15 RX ORDER — HYDROCHLOROTHIAZIDE 12.5 MG/1
1 CAPSULE ORAL
Qty: 6 EACH | Refills: 3 | Status: SHIPPED | OUTPATIENT
Start: 2025-05-15

## 2025-05-15 RX ORDER — INSULIN ASPART 100 [IU]/ML
1 INJECTION, SOLUTION INTRAVENOUS; SUBCUTANEOUS
Qty: 30 ML | Refills: 3 | Status: SHIPPED | OUTPATIENT
Start: 2025-05-15

## 2025-05-15 RX ORDER — PEN NEEDLE, DIABETIC 32GX 5/32"
NEEDLE, DISPOSABLE MISCELLANEOUS
Qty: 400 EACH | Refills: 3 | Status: SHIPPED | OUTPATIENT
Start: 2025-05-15

## 2025-05-15 ASSESSMENT — PAIN SCALES - GENERAL: PAINLEVEL_OUTOF10: NO PAIN (0)

## 2025-05-15 NOTE — LETTER
5/15/2025       RE: Piter CHENG Said  [address]  [facility] [address]     Dear Colleague,    Thank you for referring your patient, Piter Conway, to the [facility]. Please see a copy of my visit note below.    05/14/25 1:08 PM : Appointment reminder phone call made to patient. Pt verbalized understanding              Endocrinology and Diabetes      Piter Conway is a 50 year old yo male who is being referred for: Type 1 Diabetes    Medical History Pertinent to this consultation includes: Type 1 Diabetes    Interval history   He reports he has had paresthesias in his feet. Not painful.  He reports he generally feels well.   Has frequent hypoglycemias usually in the evening, happening after meals or around the time were he is exercising.       Diabetes History  Initial Diagnosis 2014  Type 1    Current Medications  Lantus 17 units in the morning  Novolog 1 unit per every 15 gr of carbohydrates (about 5 units on average)  Not using a correction factor    He has hypoglycemia awareness.     Known complications  Neuropathy. Yes  Retinopathy. Screening due.   Nephropathy. Screening due due.    Dietary Habits  Breakfast. 7 AM  Lunch. 5 PM  Dinner. 10 PM    Physical Activity  Weight training and body weight training 3 times a week usually. Around 8 PM      Monitoring  Freestyle Claudia 2    TIR 64 %  Low 4 %  High 29 %     Has hypoglycemias occurring after meals and around the time he reports he exercises.    Prior laboratory studies   Latest Reference Range & Units 07/16/14 11:02 11/14/19 09:33   C-Peptide 0.9 - 6.9 ng/mL 1.0 0.8 (L)   Glutamic Acid Decarboxylase Antibody 0.0 - 5.0 IU/mL 57.1 (H) 41.2 (H)         Works as a .      No Known Allergies    Past Medical History:   Diagnosis Date     Diabetes (H) 2014     History of hepatitis A 2001       Past Surgical History:   Procedure Laterality Date      ESOPHAGOSCOPY, GASTROSCOPY, DUODENOSCOPY (EGD), COMBINED N/A 12/1/2020    Procedure: ESOPHAGOGASTRODUODENOSCOPY, WITH BIOPSY;  Surgeon: Fabio Mota DO;  Location: [facility] OR     ESOPHAGOSCOPY, GASTROSCOPY, DUODENOSCOPY (EGD), COMBINED N/A 1/3/2023    Procedure: ESOPHAGOGASTRODUODENOSCOPY (EGD);  Surgeon: Sarah Stafford MD;  Location: [facility] OR     NO HISTORY OF SURGERY         Social History     Tobacco Use     Smoking status: Never     Smokeless tobacco: Never   Vaping Use     Vaping status: Never Used   Substance Use Topics     Alcohol use: No     Drug use: No         Review of Systems     Objective    /86   Pulse 85   Wt 78.5 kg (173 lb)   SpO2 98%   BMI 22.82 kg/m       Wt Readings from Last 3 Encounters:   05/15/25 78.5 kg (173 lb)   11/19/24 77.1 kg (170 lb)   06/18/24 77.6 kg (171 lb)        Body mass index is 22.82 kg/m .    Physical Exam    Diabetic Foot Screen:  Any complaints of increased pain or numbness ?  YES   Is there a foot ulcer now or a history of foot ulcer? No  Does the foot have an abnormal shape? No  Are the nails thick, too long or ingrown? No  Are there any redness or open areas? No         Sensation Testing done at all points on the diagram with monofilament     Right Foot: Sensation Normal at all points  Left Foot: Sensation Normal at all points     Risk Category: 0- No loss of protective sensation  Performed by Kwasi Flores MD      Assessment.       ICD-10-CM    1. Type 1 diabetes mellitus without complication (H)  E10.9 CBC with Platelets & Differential     Comprehensive metabolic panel     Lipid panel reflex to direct LDL Fasting     Albumin Random Urine Quantitative with Creat Ratio     AFINION HEMOGLOBIN A1C POCT     Adult Eye  Referral     insulin pen needle (BD MICH U/F) 32G X 4 MM miscellaneous     insulin glargine (LANTUS SOLOSTAR) 100 UNIT/ML pen     Continuous Glucose Sensor (FREESTYLE NAVEED 3 PLUS SENSOR) MISC     insulin aspart (NOVOLOG  FLEXPEN) 100 UNIT/ML pen     DISCONTINUED: insulin aspart (NOVOLOG FLEXPEN) 100 UNIT/ML pen           Plan    Type 1 diabetes mellitus (H)  He is due for screening labs which we discussed today and also provided with a referral for Ophthalmology.     A1c today is 8 % with 4 % of hypoglycemias on CGM. We discussed on optimizing bolus insulin timing, ideally 15 minutes prior to meals to improve control of post prandial hyperglycemia and reduce instances of post prandial hypoglycemia.     If he continues to have hypoglycemias in the afternoon / evening he can reduce insulin to carb ratio to 1:18  particularly on days he is doing exercise late at night.              Kwasi Garza MD  Endocrinology Fellow    Attestation signed by Oj Baron MD at 5/18/2025  9:30 PM:  Physician Attestation  I, Oj Baron MD, saw this patient and agree with the findings and plan of care as documented in the note.      Items personally reviewed/procedural attestation: vitals and labs.  Type 1 diabetes is fairly controlled with A1c 8.0%. tracked glucose using Claudia showed some hypoglycemia after meal if there is mismatch of carb and insulin. Recommend to adjust insulin at meal to match meal size.     Oj Baron MD     Again, thank you for allowing me to participate in the care of your patient.      Sincerely,    Kwasi Flores MD

## 2025-05-15 NOTE — ASSESSMENT & PLAN NOTE
He is due for screening labs which we discussed today and also provided with a referral for Ophthalmology.     A1c today is 8 % with 4 % of hypoglycemias on CGM. We discussed on optimizing bolus insulin timing, ideally 15 minutes prior to meals to improve control of post prandial hyperglycemia and reduce instances of post prandial hypoglycemia.     If he continues to have hypoglycemias in the afternoon / evening he can reduce insulin to carb ratio to 1:18  particularly on days he is doing exercise late at night.

## 2025-05-15 NOTE — PROGRESS NOTES
Endocrinology and Diabetes      Piter Conway is a 50 year old yo male who is being referred for: Type 1 Diabetes    Medical History Pertinent to this consultation includes: Type 1 Diabetes    Interval history   He reports he has had paresthesias in his feet. Not painful.  He reports he generally feels well.   Has frequent hypoglycemias usually in the evening, happening after meals or around the time were he is exercising.       Diabetes History  Initial Diagnosis 2014  Type 1    Current Medications  Lantus 17 units in the morning  Novolog 1 unit per every 15 gr of carbohydrates (about 5 units on average)  Not using a correction factor    He has hypoglycemia awareness.     Known complications  Neuropathy. Yes  Retinopathy. Screening due.   Nephropathy. Screening due due.    Dietary Habits  Breakfast. 7 AM  Lunch. 5 PM  Dinner. 10 PM    Physical Activity  Weight training and body weight training 3 times a week usually. Around 8 PM      Monitoring  Freestyle Claudia 2    TIR 64 %  Low 4 %  High 29 %     Has hypoglycemias occurring after meals and around the time he reports he exercises.    Prior laboratory studies   Latest Reference Range & Units 07/16/14 11:02 11/14/19 09:33   C-Peptide 0.9 - 6.9 ng/mL 1.0 0.8 (L)   Glutamic Acid Decarboxylase Antibody 0.0 - 5.0 IU/mL 57.1 (H) 41.2 (H)         Works as a .      No Known Allergies    Past Medical History:   Diagnosis Date    Diabetes (H) 2014    History of hepatitis A 2001       Past Surgical History:   Procedure Laterality Date    ESOPHAGOSCOPY, GASTROSCOPY, DUODENOSCOPY (EGD), COMBINED N/A 12/1/2020    Procedure: ESOPHAGOGASTRODUODENOSCOPY, WITH BIOPSY;  Surgeon: Fabio Mota DO;  Location: [facility]    ESOPHAGOSCOPY, GASTROSCOPY, DUODENOSCOPY (EGD), COMBINED N/A 1/3/2023    Procedure: ESOPHAGOGASTRODUODENOSCOPY (EGD);  Surgeon: Sarah Stafford MD;  Location: [facility]    NO HISTORY OF SURGERY         Social History     Tobacco Use     Smoking status: Never    Smokeless tobacco: Never   Vaping Use    Vaping status: Never Used   Substance Use Topics    Alcohol use: No    Drug use: No         Review of Systems     Objective    /86   Pulse 85   Wt 78.5 kg (173 lb)   SpO2 98%   BMI 22.82 kg/m       Wt Readings from Last 3 Encounters:   05/15/25 78.5 kg (173 lb)   11/19/24 77.1 kg (170 lb)   06/18/24 77.6 kg (171 lb)        Body mass index is 22.82 kg/m .    Physical Exam    Diabetic Foot Screen:  Any complaints of increased pain or numbness ?  YES   Is there a foot ulcer now or a history of foot ulcer? No  Does the foot have an abnormal shape? No  Are the nails thick, too long or ingrown? No  Are there any redness or open areas? No         Sensation Testing done at all points on the diagram with monofilament     Right Foot: Sensation Normal at all points  Left Foot: Sensation Normal at all points     Risk Category: 0- No loss of protective sensation  Performed by Kwasi Flores MD      Assessment.       ICD-10-CM    1. Type 1 diabetes mellitus without complication (H)  E10.9 CBC with Platelets & Differential     Comprehensive metabolic panel     Lipid panel reflex to direct LDL Fasting     Albumin Random Urine Quantitative with Creat Ratio     AFINION HEMOGLOBIN A1C POCT     Adult Eye  Referral     insulin pen needle (BD MICH U/F) 32G X 4 MM miscellaneous     insulin glargine (LANTUS SOLOSTAR) 100 UNIT/ML pen     Continuous Glucose Sensor (FREESTYLE NAVEED 3 PLUS SENSOR) MISC     insulin aspart (NOVOLOG FLEXPEN) 100 UNIT/ML pen     DISCONTINUED: insulin aspart (NOVOLOG FLEXPEN) 100 UNIT/ML pen           Plan    Type 1 diabetes mellitus (H)  He is due for screening labs which we discussed today and also provided with a referral for Ophthalmology.     A1c today is 8 % with 4 % of hypoglycemias on CGM. We discussed on optimizing bolus insulin timing, ideally 15 minutes prior to meals to improve control of post prandial hyperglycemia  and reduce instances of post prandial hypoglycemia.     If he continues to have hypoglycemias in the afternoon / evening he can reduce insulin to carb ratio to 1:18  particularly on days he is doing exercise late at night.              Kwasi Garza MD  Endocrinology Fellow

## 2025-05-15 NOTE — NURSING NOTE
"Chief Complaint   Patient presents with    Diabetes     Vital signs:      BP: 138/86 Pulse: 85     SpO2: 98 %       Weight: 78.5 kg (173 lb)  Estimated body mass index is 22.82 kg/m  as calculated from the following:    Height as of 11/19/24: 1.854 m (6' 1\").    Weight as of this encounter: 78.5 kg (173 lb).      Piter is DUE for a diabetic foot exam, and states that sometimes he has numbness and tingling in his feet.  "

## 2025-05-19 ENCOUNTER — PATIENT OUTREACH (OUTPATIENT)
Dept: CARE COORDINATION | Facility: CLINIC | Age: 50
End: 2025-05-19
Payer: COMMERCIAL

## 2025-05-20 DIAGNOSIS — H35.89 MACULAR RPE MOTTLING: Primary | ICD-10-CM

## 2025-05-20 NOTE — PROGRESS NOTES
HPI:  Patient presents for a diabetic eye exam. Patient is interested in reading glasses today.       Pertinent Medical History:  Tension headache  Seasonal allergic rhinitis  Type 1 diabetes mellitus  Peripheral neuropathy    Ocular History:  Allergic conjunctivitis, both eyes.   Cataract, both eyes.   Macular RPE mottling left eye.   Myopia, both eyes.   Dry eye syndrome, both eyes.     Eye Medications:  None    Assessment and Plan:    #   No Diabetic Retinopathy, both eyes.   Macular OCT 05/27/2025: Right eye: rpe mottling, no SRF; Left eye: rpe mottling, no SRF  Last HA1C was 8.0 on 05/15/2025.   Goal is to keep HA1C under 7.0 to prevent blindness.   Recommend annual diabetic eye exam with macular OCT.      #   Macular Retinal Pigment Epithelium Mottling, both eyes.   Macular OCT 05/27/2025: Right eye: rpe mottling, no SRF; Left eye: rpe mottling, no SRF  RPE mottling seen on fundus exam left eye and OCT both eyes.   Retinal pigment epithelium could be a precursor to macular degeneration. Macular degeneration causes central vision blindness and there is no cure. The goal of initiating treatment is to slow down progression and to preserve vision. Recommend regular eye exams to rule out macular degeneration.   Return immediately if there is waviness in vision or decrease in vision.   Recommend annual dilated eye exam with macular OCT.     #   Presbyopia, both eyes.   Reading only.   Glasses prescription given.      #   Allergic Conjunctivitis, both eyes.   Signs and symptoms of allergic conjunctivitis includes itching and discharge.   Pataday extra strength once daily, both eyes.     #   Dry Eye Syndrome, both eyes.  Symptoms of dry eyes include blurry vision, eye pain, grittiness, burning, redness, eye irritation, light sensitivity, and tearing. The goal is not to eliminate, but to improve symptoms.   Preservative free artificial tears 4 times daily both eyes. Refresh. Can use up to every 2 hours both eyes as  needed.      #   Cataract, both eyes.   Not visually significant.   May need more lighting for near work and reading.   Recommend UV protection.   Recommend annual dilated eye exam.        Patient consented to a dilated eye exam:    Yes. Side effects discussed.  Mood/affect: nice    Medical History:  Past Medical History:   Diagnosis Date    Diabetes (H) 2014    History of hepatitis A 2001       Medications:  Current Outpatient Medications   Medication Sig Dispense Refill    ACCU-CHEK GUIDE test strip TEST BLOOD SUGAR TWO TIMES A DAY OR AS DIRECTED 200 strip 2    ammonium lactate (AMLACTIN) 12 % external cream Apply topically daily To feet. 385 g 5    blood glucose monitoring (ACCU-CHEK FASTCLIX) lancets TEST TWO TIMES A  each 3    celecoxib (CELEBREX) 200 MG capsule Take 1 capsule (200 mg) by mouth daily 90 capsule 1    ciclopirox (LOPROX) 0.77 % cream Apply topically 2 times daily To feet. 90 g 5    Continuous Glucose Sensor (FREESTYLE NAVEED 2 SENSOR) MISC APPLY 1 SENSOR AND CHANGE EVERY 14 DAYS AS DIRECTED 6 each 2    Continuous Glucose Sensor (FREESTYLE NAVEED 3 PLUS SENSOR) MISC Externally apply 1 Units topically every 14 days. 6 each 3    DiphenhydrAMINE HCl (BENADRYL ALLERGY PO) Take 1 tablet by mouth daily      fluticasone (FLONASE) 50 MCG/ACT nasal spray Spray 2 sprays into both nostrils daily 16 g 3    insulin aspart (NOVOLOG FLEXPEN) 100 UNIT/ML pen Inject 1 Units subcutaneously 3 times daily (with meals). 1 unit per every 18 grams of carb and 1 unit per every 50 mg over 150 for correction. Max daily dose of 50 units daily 30 mL 3    insulin glargine (LANTUS SOLOSTAR) 100 UNIT/ML pen Inject 17 Units subcutaneously every morning. 15 mL 3    insulin pen needle (BD MICH U/F) 32G X 4 MM miscellaneous USE 4 PEN NEEDLES DAILY OR AS DIRECTED 400 each 3    omeprazole (PRILOSEC) 20 MG DR capsule Take 20 mg po twice daily 180 capsule 3    polyethylene glycol (MIRALAX) powder Take 17 g (1 capful) by mouth 2  times daily as needed for constipation 510 g 1    urea (GORMEL) 20 % external cream Apply topically daily To feet. 120 g 5   Complete documentation of historical and exam elements from today's encounter can be found in the full encounter summary report (not reduplicated in this progress note). I personally obtained the chief complaint(s) and history of present illness.  I confirmed and edited as necessary the review of systems, past medical/surgical history, family history, social history, and examination findings as documented by others; and I examined the patient myself. I personally reviewed the relevant tests, images, and reports as documented above. I formulated and edited as necessary the assessment and plan and discussed the findings and management plan with the patient and family. - Carlos Del Valle, RUBINA

## 2025-05-21 ENCOUNTER — RESULTS FOLLOW-UP (OUTPATIENT)
Dept: ENDOCRINOLOGY | Facility: CLINIC | Age: 50
End: 2025-05-21

## 2025-05-21 NOTE — RESULT ENCOUNTER NOTE
Piter,     Your LDL (bad) cholesterol has improved. There is no protein in the urine. Your kidney function is normal. Your laboratory results, other than your A1c, are normal.     Kwasi Garza

## 2025-05-27 ENCOUNTER — OFFICE VISIT (OUTPATIENT)
Dept: OPHTHALMOLOGY | Facility: CLINIC | Age: 50
End: 2025-05-27
Attending: INTERNAL MEDICINE
Payer: COMMERCIAL

## 2025-05-27 DIAGNOSIS — H10.13 ALLERGIC CONJUNCTIVITIS OF BOTH EYES: ICD-10-CM

## 2025-05-27 DIAGNOSIS — H52.13 MYOPIA OF BOTH EYES: Primary | ICD-10-CM

## 2025-05-27 DIAGNOSIS — H25.13 NUCLEAR SENILE CATARACT OF BOTH EYES: ICD-10-CM

## 2025-05-27 DIAGNOSIS — H35.89 MACULAR RPE MOTTLING: ICD-10-CM

## 2025-05-27 DIAGNOSIS — E10.9 TYPE 1 DIABETES MELLITUS WITHOUT COMPLICATION (H): ICD-10-CM

## 2025-05-27 DIAGNOSIS — H04.123 DRY EYE SYNDROME OF BOTH EYES: ICD-10-CM

## 2025-05-27 PROCEDURE — 92004 COMPRE OPH EXAM NEW PT 1/>: CPT | Performed by: OPTOMETRIST

## 2025-05-27 PROCEDURE — 92015 DETERMINE REFRACTIVE STATE: CPT

## 2025-05-27 PROCEDURE — G0463 HOSPITAL OUTPT CLINIC VISIT: HCPCS | Performed by: OPTOMETRIST

## 2025-05-27 PROCEDURE — 92134 CPTRZ OPH DX IMG PST SGM RTA: CPT | Performed by: OPTOMETRIST

## 2025-05-27 RX ORDER — OLOPATADINE HYDROCHLORIDE 2 MG/ML
1 SOLUTION OPHTHALMIC DAILY
Qty: 15 ML | Refills: 11 | Status: SHIPPED | OUTPATIENT
Start: 2025-05-27

## 2025-05-27 RX ORDER — CARBOXYMETHYLCELLULOSE SODIUM 5 MG/ML
1 SOLUTION/ DROPS OPHTHALMIC 4 TIMES DAILY
Qty: 400 EACH | Refills: 11 | Status: SHIPPED | OUTPATIENT
Start: 2025-05-27

## 2025-05-27 ASSESSMENT — VISUAL ACUITY
OD_SC: 20/20
OS_SC: 20/20
OS_SC+: -2
OD_SC+: -1
METHOD: SNELLEN - LINEAR

## 2025-05-27 ASSESSMENT — REFRACTION_MANIFEST
OD_CYLINDER: +0.25
OD_SPHERE: -0.25
OD_ADD: +2.25
OS_AXIS: 010
OS_SPHERE: -0.25
OS_CYLINDER: +0.75
OD_AXIS: 005
OS_ADD: +2.25

## 2025-05-27 ASSESSMENT — CONF VISUAL FIELD
OD_INFERIOR_TEMPORAL_RESTRICTION: 0
OS_INFERIOR_NASAL_RESTRICTION: 0
OS_SUPERIOR_NASAL_RESTRICTION: 0
OS_NORMAL: 1
OD_NORMAL: 1
OD_SUPERIOR_TEMPORAL_RESTRICTION: 0
OD_SUPERIOR_NASAL_RESTRICTION: 0
OS_SUPERIOR_TEMPORAL_RESTRICTION: 0
OS_INFERIOR_TEMPORAL_RESTRICTION: 0
OD_INFERIOR_NASAL_RESTRICTION: 0

## 2025-05-27 ASSESSMENT — EXTERNAL EXAM - RIGHT EYE: OD_EXAM: NORMAL

## 2025-05-27 ASSESSMENT — SLIT LAMP EXAM - LIDS
COMMENTS: NORMAL
COMMENTS: NORMAL

## 2025-05-27 ASSESSMENT — TONOMETRY
OD_IOP_MMHG: 15
OS_IOP_MMHG: 15
IOP_METHOD: TONOPEN

## 2025-05-27 ASSESSMENT — EXTERNAL EXAM - LEFT EYE: OS_EXAM: NORMAL

## 2025-05-27 NOTE — NURSING NOTE
Chief Complaints and History of Present Illnesses   Patient presents with    Diabetic Eye Exam    problems with near vision      Chief Complaint(s) and History of Present Illness(es)       Diabetic Eye Exam               problems with near vision                Comments    More difficulty with reading distance ou, must hold reading material  further away, would like reading glasses at this point.   Distance vision seems fine ou.   Itching, redness, tearing ou Spring time.   Denies pain, flashes, floaters, dark curtain.     Ocular meds: none    FBS: 232 this am     Lab Results       Component                Value               Date                       A1C                      8.0                 05/15/2025                 A1C                      7.4                 10/07/2024                 A1C                      7.4                 06/18/2024                 A1C                      6.8                 02/02/2024                 A1C                      7.1                 09/27/2023                 A1C                      7.4                 03/27/2023                 A1C                      7.9                 09/21/2022                 A1C                      7.3                 06/30/2021                 A1C                      7.2                 11/28/2020                 A1C                      7.6                 09/17/2020                 A1C                      6.9                 03/04/2019                 A1C                      6.3                 12/11/2017                Sheila HAWK, May 27, 2025 11:51 AM

## (undated) DEVICE — GOWN IMPERVIOUS 2XL BLUE

## (undated) DEVICE — TUBING SUCTION 12"X1/4" N612

## (undated) DEVICE — SUCTION MANIFOLD NEPTUNE 2 SYS 1 PORT 702-025-000

## (undated) DEVICE — GLOVE EXAM NITRILE LG PF LATEX FREE 5064

## (undated) DEVICE — ENDO FORCEP BX CAPTURA PRO SPIKE G50696

## (undated) DEVICE — SYR 30ML SLIP TIP W/O NDL 302833

## (undated) DEVICE — SOL WATER IRRIG 1000ML BOTTLE 2F7114

## (undated) DEVICE — ENDO BITE BLOCK ADULT OMNI-BLOC

## (undated) DEVICE — KIT ENDO TURNOVER/PROCEDURE CARRY-ON 101822

## (undated) DEVICE — SOL WATER IRRIG 500ML BOTTLE 2F7113

## (undated) DEVICE — SUCTION CATH AIRLIFE TRI-FLO W/CONTROL PORT 14FR  T60C

## (undated) RX ORDER — TRIAMCINOLONE ACETONIDE 40 MG/ML
INJECTION, SUSPENSION INTRA-ARTICULAR; INTRAMUSCULAR
Status: DISPENSED
Start: 2022-11-03

## (undated) RX ORDER — FENTANYL CITRATE 50 UG/ML
INJECTION, SOLUTION INTRAMUSCULAR; INTRAVENOUS
Status: DISPENSED
Start: 2020-12-01

## (undated) RX ORDER — LIDOCAINE HYDROCHLORIDE 10 MG/ML
INJECTION, SOLUTION EPIDURAL; INFILTRATION; INTRACAUDAL; PERINEURAL
Status: DISPENSED
Start: 2023-05-18

## (undated) RX ORDER — BETAMETHASONE SODIUM PHOSPHATE AND BETAMETHASONE ACETATE 3; 3 MG/ML; MG/ML
INJECTION, SUSPENSION INTRA-ARTICULAR; INTRALESIONAL; INTRAMUSCULAR; SOFT TISSUE
Status: DISPENSED
Start: 2023-05-18

## (undated) RX ORDER — LIDOCAINE HYDROCHLORIDE 10 MG/ML
INJECTION, SOLUTION EPIDURAL; INFILTRATION; INTRACAUDAL; PERINEURAL
Status: DISPENSED
Start: 2022-11-03